# Patient Record
Sex: MALE | Race: WHITE | Employment: FULL TIME | URBAN - METROPOLITAN AREA
[De-identification: names, ages, dates, MRNs, and addresses within clinical notes are randomized per-mention and may not be internally consistent; named-entity substitution may affect disease eponyms.]

---

## 2017-08-21 ENCOUNTER — APPOINTMENT (OUTPATIENT)
Dept: MRI IMAGING | Age: 37
DRG: 103 | End: 2017-08-21
Payer: COMMERCIAL

## 2017-08-21 ENCOUNTER — HOSPITAL ENCOUNTER (INPATIENT)
Age: 37
LOS: 5 days | Discharge: HOME | DRG: 103 | End: 2017-08-26
Attending: EMERGENCY MEDICINE | Admitting: INTERNAL MEDICINE
Payer: COMMERCIAL

## 2017-08-21 ENCOUNTER — APPOINTMENT (OUTPATIENT)
Dept: CT IMAGING | Age: 37
DRG: 103 | End: 2017-08-21
Payer: COMMERCIAL

## 2017-08-21 ENCOUNTER — OFFICE VISIT (OUTPATIENT)
Dept: FAMILY MEDICINE CLINIC | Age: 37
End: 2017-08-21
Payer: COMMERCIAL

## 2017-08-21 VITALS
WEIGHT: 315 LBS | HEIGHT: 76 IN | HEART RATE: 90 BPM | RESPIRATION RATE: 18 BRPM | SYSTOLIC BLOOD PRESSURE: 140 MMHG | BODY MASS INDEX: 38.36 KG/M2 | DIASTOLIC BLOOD PRESSURE: 80 MMHG | OXYGEN SATURATION: 96 %

## 2017-08-21 DIAGNOSIS — R51.9 HEADACHE, UNSPECIFIED HEADACHE TYPE: ICD-10-CM

## 2017-08-21 DIAGNOSIS — G89.29 CHRONIC BILATERAL LOW BACK PAIN WITH RIGHT-SIDED SCIATICA: ICD-10-CM

## 2017-08-21 DIAGNOSIS — M54.2 CERVICALGIA: ICD-10-CM

## 2017-08-21 DIAGNOSIS — R53.1 RIGHT SIDED WEAKNESS: Primary | ICD-10-CM

## 2017-08-21 DIAGNOSIS — M54.41 CHRONIC BILATERAL LOW BACK PAIN WITH RIGHT-SIDED SCIATICA: ICD-10-CM

## 2017-08-21 DIAGNOSIS — R29.898 RIGHT ARM WEAKNESS: ICD-10-CM

## 2017-08-21 DIAGNOSIS — R40.1 OBTUNDED: Primary | ICD-10-CM

## 2017-08-21 DIAGNOSIS — R29.898 RIGHT LEG WEAKNESS: ICD-10-CM

## 2017-08-21 DIAGNOSIS — M54.59 MECHANICAL LOW BACK PAIN: ICD-10-CM

## 2017-08-21 LAB
ABSOLUTE EOS #: 0 K/UL (ref 0–0.4)
ABSOLUTE LYMPH #: 1.8 K/UL (ref 1–4.8)
ABSOLUTE MONO #: 0.7 K/UL (ref 0.1–1.2)
ALLEN TEST: ABNORMAL
ANION GAP SERPL CALCULATED.3IONS-SCNC: 12 MMOL/L (ref 9–17)
ANION GAP: 6 MMOL/L (ref 7–16)
BASOPHILS # BLD: 0 %
BASOPHILS ABSOLUTE: 0 K/UL (ref 0–0.2)
BUN BLDV-MCNC: 15 MG/DL (ref 6–20)
BUN/CREAT BLD: ABNORMAL (ref 9–20)
CALCIUM SERPL-MCNC: 9.2 MG/DL (ref 8.6–10.4)
CHLORIDE BLD-SCNC: 101 MMOL/L (ref 98–107)
CO2: 21 MMOL/L (ref 20–31)
CREAT SERPL-MCNC: 0.56 MG/DL (ref 0.7–1.2)
DIFFERENTIAL TYPE: NORMAL
EOSINOPHILS RELATIVE PERCENT: 0 %
FIO2: ABNORMAL
GFR AFRICAN AMERICAN: >60 ML/MIN
GFR NON-AFRICAN AMERICAN: >60 ML/MIN
GFR NON-AFRICAN AMERICAN: NORMAL ML/MIN
GFR SERPL CREATININE-BSD FRML MDRD: ABNORMAL ML/MIN/{1.73_M2}
GFR SERPL CREATININE-BSD FRML MDRD: ABNORMAL ML/MIN/{1.73_M2}
GFR SERPL CREATININE-BSD FRML MDRD: NORMAL ML/MIN
GFR SERPL CREATININE-BSD FRML MDRD: NORMAL ML/MIN/{1.73_M2}
GLUCOSE BLD-MCNC: 101 MG/DL (ref 74–100)
GLUCOSE BLD-MCNC: 77 MG/DL (ref 70–99)
GLUCOSE BLD-MCNC: 89 MG/DL (ref 75–110)
HCO3 VENOUS: 29.3 MMOL/L (ref 22–29)
HCT VFR BLD CALC: 45.8 % (ref 41–53)
HEMOGLOBIN: 15.6 G/DL (ref 13.5–17.5)
INR BLD: 1
LYMPHOCYTES # BLD: 24 %
MCH RBC QN AUTO: 30 PG (ref 26–34)
MCHC RBC AUTO-ENTMCNC: 34 G/DL (ref 31–37)
MCV RBC AUTO: 88.3 FL (ref 80–100)
MODE: ABNORMAL
MONOCYTES # BLD: 10 %
NEGATIVE BASE EXCESS, VEN: ABNORMAL (ref 0–2)
O2 DEVICE/FLOW/%: ABNORMAL
O2 SAT, VEN: 84 % (ref 60–85)
PARTIAL THROMBOPLASTIN TIME: 23.9 SEC (ref 21.3–31.3)
PATIENT TEMP: ABNORMAL
PCO2, VEN: 41 MM HG (ref 41–51)
PDW BLD-RTO: 13.8 % (ref 12.5–15.4)
PH VENOUS: 7.46 (ref 7.32–7.43)
PLATELET # BLD: 188 K/UL (ref 140–450)
PLATELET ESTIMATE: NORMAL
PMV BLD AUTO: 9.3 FL (ref 6–12)
PO2, VEN: 46.1 MM HG (ref 30–50)
POC CHLORIDE: 108 MMOL/L (ref 98–107)
POC CREATININE: 0.9 MG/DL (ref 0.51–1.19)
POC HEMATOCRIT: 48 % (ref 41–53)
POC HEMOGLOBIN: 16.4 G/DL (ref 13.5–17.5)
POC IONIZED CALCIUM: 1.15 MMOL/L (ref 1.15–1.33)
POC LACTIC ACID: 1.33 MMOL/L (ref 0.56–1.39)
POC PCO2 TEMP: ABNORMAL MM HG
POC PH TEMP: ABNORMAL
POC PO2 TEMP: ABNORMAL MM HG
POC POTASSIUM: 4.1 MMOL/L (ref 3.5–4.5)
POC SODIUM: 143 MMOL/L (ref 138–146)
POC TROPONIN I: 0 NG/ML (ref 0–0.1)
POC TROPONIN I: 0 NG/ML (ref 0–0.1)
POC TROPONIN INTERP: NORMAL
POC TROPONIN INTERP: NORMAL
POSITIVE BASE EXCESS, VEN: 5 (ref 0–3)
POTASSIUM SERPL-SCNC: 4.7 MMOL/L (ref 3.7–5.3)
PROTHROMBIN TIME: 10.5 SEC (ref 9.4–12.6)
RBC # BLD: 5.19 M/UL (ref 4.5–5.9)
RBC # BLD: NORMAL 10*6/UL
SAMPLE SITE: ABNORMAL
SEG NEUTROPHILS: 66 %
SEGMENTED NEUTROPHILS ABSOLUTE COUNT: 4.9 K/UL (ref 1.8–7.7)
SODIUM BLD-SCNC: 134 MMOL/L (ref 135–144)
TOTAL CO2, VENOUS: 31 MMOL/L (ref 23–30)
WBC # BLD: 7.5 K/UL (ref 3.5–11)
WBC # BLD: NORMAL 10*3/UL

## 2017-08-21 PROCEDURE — 82565 ASSAY OF CREATININE: CPT

## 2017-08-21 PROCEDURE — 84484 ASSAY OF TROPONIN QUANT: CPT

## 2017-08-21 PROCEDURE — 99254 IP/OBS CNSLTJ NEW/EST MOD 60: CPT | Performed by: PSYCHIATRY & NEUROLOGY

## 2017-08-21 PROCEDURE — 93000 ELECTROCARDIOGRAM COMPLETE: CPT | Performed by: NURSE PRACTITIONER

## 2017-08-21 PROCEDURE — 70551 MRI BRAIN STEM W/O DYE: CPT

## 2017-08-21 PROCEDURE — 6370000000 HC RX 637 (ALT 250 FOR IP): Performed by: INTERNAL MEDICINE

## 2017-08-21 PROCEDURE — 80048 BASIC METABOLIC PNL TOTAL CA: CPT

## 2017-08-21 PROCEDURE — 70498 CT ANGIOGRAPHY NECK: CPT

## 2017-08-21 PROCEDURE — 2060000000 HC ICU INTERMEDIATE R&B

## 2017-08-21 PROCEDURE — 82947 ASSAY GLUCOSE BLOOD QUANT: CPT

## 2017-08-21 PROCEDURE — 99285 EMERGENCY DEPT VISIT HI MDM: CPT

## 2017-08-21 PROCEDURE — 85014 HEMATOCRIT: CPT

## 2017-08-21 PROCEDURE — 83605 ASSAY OF LACTIC ACID: CPT

## 2017-08-21 PROCEDURE — 93005 ELECTROCARDIOGRAM TRACING: CPT

## 2017-08-21 PROCEDURE — 84132 ASSAY OF SERUM POTASSIUM: CPT

## 2017-08-21 PROCEDURE — 70496 CT ANGIOGRAPHY HEAD: CPT

## 2017-08-21 PROCEDURE — 82803 BLOOD GASES ANY COMBINATION: CPT

## 2017-08-21 PROCEDURE — 84295 ASSAY OF SERUM SODIUM: CPT

## 2017-08-21 PROCEDURE — 99214 OFFICE O/P EST MOD 30 MIN: CPT | Performed by: NURSE PRACTITIONER

## 2017-08-21 PROCEDURE — 6370000000 HC RX 637 (ALT 250 FOR IP): Performed by: EMERGENCY MEDICINE

## 2017-08-21 PROCEDURE — 6360000004 HC RX CONTRAST MEDICATION: Performed by: EMERGENCY MEDICINE

## 2017-08-21 PROCEDURE — 2580000003 HC RX 258: Performed by: INTERNAL MEDICINE

## 2017-08-21 PROCEDURE — 82435 ASSAY OF BLOOD CHLORIDE: CPT

## 2017-08-21 PROCEDURE — 85730 THROMBOPLASTIN TIME PARTIAL: CPT

## 2017-08-21 PROCEDURE — 82330 ASSAY OF CALCIUM: CPT

## 2017-08-21 PROCEDURE — 85025 COMPLETE CBC W/AUTO DIFF WBC: CPT

## 2017-08-21 PROCEDURE — 85610 PROTHROMBIN TIME: CPT

## 2017-08-21 RX ORDER — HYDROCHLOROTHIAZIDE 12.5 MG/1
TABLET ORAL
Refills: 0 | COMMUNITY
Start: 2017-08-10 | End: 2017-11-27 | Stop reason: SDUPTHER

## 2017-08-21 RX ORDER — SODIUM CHLORIDE 0.9 % (FLUSH) 0.9 %
10 SYRINGE (ML) INJECTION PRN
Status: DISCONTINUED | OUTPATIENT
Start: 2017-08-21 | End: 2017-08-26 | Stop reason: HOSPADM

## 2017-08-21 RX ORDER — ASPIRIN 81 MG/1
81 TABLET, CHEWABLE ORAL ONCE
Status: COMPLETED | OUTPATIENT
Start: 2017-08-21 | End: 2017-08-21

## 2017-08-21 RX ORDER — ACETAMINOPHEN 325 MG/1
650 TABLET ORAL EVERY 4 HOURS PRN
Status: DISCONTINUED | OUTPATIENT
Start: 2017-08-21 | End: 2017-08-22 | Stop reason: SDUPTHER

## 2017-08-21 RX ORDER — VENLAFAXINE HYDROCHLORIDE 150 MG/1
CAPSULE, EXTENDED RELEASE ORAL
Refills: 0 | COMMUNITY
Start: 2017-08-10 | End: 2017-11-27 | Stop reason: DRUGHIGH

## 2017-08-21 RX ORDER — SODIUM CHLORIDE 0.9 % (FLUSH) 0.9 %
10 SYRINGE (ML) INJECTION EVERY 12 HOURS SCHEDULED
Status: DISCONTINUED | OUTPATIENT
Start: 2017-08-21 | End: 2017-08-26 | Stop reason: HOSPADM

## 2017-08-21 RX ADMIN — ACETAMINOPHEN 650 MG: 325 TABLET ORAL at 21:14

## 2017-08-21 RX ADMIN — ASPIRIN 81 MG 81 MG: 81 TABLET ORAL at 17:50

## 2017-08-21 RX ADMIN — IOVERSOL 90 ML: 741 INJECTION INTRA-ARTERIAL; INTRAVENOUS at 16:55

## 2017-08-21 RX ADMIN — Medication 10 ML: at 21:16

## 2017-08-21 ASSESSMENT — ENCOUNTER SYMPTOMS
BLURRED VISION: 0
SHORTNESS OF BREATH: 0
EYE WATERING: 0
NAUSEA: 0
SCALP TENDERNESS: 1
CHEST TIGHTNESS: 0
VISUAL CHANGE: 0
FACIAL SWEATING: 0

## 2017-08-21 ASSESSMENT — PAIN SCALES - GENERAL
PAINLEVEL_OUTOF10: 7
PAINLEVEL_OUTOF10: 7

## 2017-08-21 ASSESSMENT — PAIN DESCRIPTION - DESCRIPTORS: DESCRIPTORS: HEADACHE

## 2017-08-22 ENCOUNTER — APPOINTMENT (OUTPATIENT)
Dept: MRI IMAGING | Age: 37
DRG: 103 | End: 2017-08-22
Payer: COMMERCIAL

## 2017-08-22 PROBLEM — G43.119 INTRACTABLE MIGRAINE WITH AURA WITHOUT STATUS MIGRAINOSUS: Status: ACTIVE | Noted: 2017-08-22

## 2017-08-22 LAB
ANION GAP SERPL CALCULATED.3IONS-SCNC: 15 MMOL/L (ref 9–17)
BUN BLDV-MCNC: 14 MG/DL (ref 6–20)
BUN/CREAT BLD: ABNORMAL (ref 9–20)
CALCIUM SERPL-MCNC: 8.9 MG/DL (ref 8.6–10.4)
CHLORIDE BLD-SCNC: 99 MMOL/L (ref 98–107)
CHOLESTEROL/HDL RATIO: 5.1
CHOLESTEROL: 147 MG/DL
CO2: 24 MMOL/L (ref 20–31)
CREAT SERPL-MCNC: 0.59 MG/DL (ref 0.7–1.2)
ESTIMATED AVERAGE GLUCOSE: 97 MG/DL
FOLATE: 11.6 NG/ML
GFR AFRICAN AMERICAN: >60 ML/MIN
GFR NON-AFRICAN AMERICAN: >60 ML/MIN
GFR SERPL CREATININE-BSD FRML MDRD: ABNORMAL ML/MIN/{1.73_M2}
GFR SERPL CREATININE-BSD FRML MDRD: ABNORMAL ML/MIN/{1.73_M2}
GLUCOSE BLD-MCNC: 132 MG/DL (ref 75–110)
GLUCOSE BLD-MCNC: 158 MG/DL (ref 75–110)
GLUCOSE BLD-MCNC: 187 MG/DL (ref 75–110)
GLUCOSE BLD-MCNC: 223 MG/DL (ref 75–110)
GLUCOSE BLD-MCNC: 99 MG/DL (ref 70–99)
HBA1C MFR BLD: 5 % (ref 4–6)
HCT VFR BLD CALC: 45.5 % (ref 41–53)
HDLC SERPL-MCNC: 29 MG/DL
HEMOGLOBIN: 15.6 G/DL (ref 13.5–17.5)
LDL CHOLESTEROL: 88 MG/DL (ref 0–130)
MCH RBC QN AUTO: 30.5 PG (ref 26–34)
MCHC RBC AUTO-ENTMCNC: 34.2 G/DL (ref 31–37)
MCV RBC AUTO: 89.1 FL (ref 80–100)
PDW BLD-RTO: 13.4 % (ref 12.5–15.4)
PLATELET # BLD: 186 K/UL (ref 140–450)
PMV BLD AUTO: 8.2 FL (ref 6–12)
POTASSIUM SERPL-SCNC: 3.6 MMOL/L (ref 3.7–5.3)
RBC # BLD: 5.11 M/UL (ref 4.5–5.9)
SODIUM BLD-SCNC: 138 MMOL/L (ref 135–144)
TRIGL SERPL-MCNC: 149 MG/DL
TSH SERPL DL<=0.05 MIU/L-ACNC: 1.63 MIU/L (ref 0.3–5)
VITAMIN B-12: 328 PG/ML (ref 211–946)
VLDLC SERPL CALC-MCNC: ABNORMAL MG/DL (ref 1–30)
WBC # BLD: 7.5 K/UL (ref 3.5–11)

## 2017-08-22 PROCEDURE — 6370000000 HC RX 637 (ALT 250 FOR IP): Performed by: PSYCHIATRY & NEUROLOGY

## 2017-08-22 PROCEDURE — 6360000002 HC RX W HCPCS: Performed by: INTERNAL MEDICINE

## 2017-08-22 PROCEDURE — G8979 MOBILITY GOAL STATUS: HCPCS

## 2017-08-22 PROCEDURE — 80048 BASIC METABOLIC PNL TOTAL CA: CPT

## 2017-08-22 PROCEDURE — 83036 HEMOGLOBIN GLYCOSYLATED A1C: CPT

## 2017-08-22 PROCEDURE — 72141 MRI NECK SPINE W/O DYE: CPT

## 2017-08-22 PROCEDURE — 2580000003 HC RX 258: Performed by: INTERNAL MEDICINE

## 2017-08-22 PROCEDURE — 82947 ASSAY GLUCOSE BLOOD QUANT: CPT

## 2017-08-22 PROCEDURE — 99222 1ST HOSP IP/OBS MODERATE 55: CPT | Performed by: PSYCHIATRY & NEUROLOGY

## 2017-08-22 PROCEDURE — 87040 BLOOD CULTURE FOR BACTERIA: CPT

## 2017-08-22 PROCEDURE — 6370000000 HC RX 637 (ALT 250 FOR IP): Performed by: INTERNAL MEDICINE

## 2017-08-22 PROCEDURE — 80061 LIPID PANEL: CPT

## 2017-08-22 PROCEDURE — 36415 COLL VENOUS BLD VENIPUNCTURE: CPT

## 2017-08-22 PROCEDURE — 82746 ASSAY OF FOLIC ACID SERUM: CPT

## 2017-08-22 PROCEDURE — 99222 1ST HOSP IP/OBS MODERATE 55: CPT | Performed by: INTERNAL MEDICINE

## 2017-08-22 PROCEDURE — G8978 MOBILITY CURRENT STATUS: HCPCS

## 2017-08-22 PROCEDURE — 82607 VITAMIN B-12: CPT

## 2017-08-22 PROCEDURE — 85027 COMPLETE CBC AUTOMATED: CPT

## 2017-08-22 PROCEDURE — 2060000000 HC ICU INTERMEDIATE R&B

## 2017-08-22 PROCEDURE — 72148 MRI LUMBAR SPINE W/O DYE: CPT

## 2017-08-22 PROCEDURE — 97530 THERAPEUTIC ACTIVITIES: CPT

## 2017-08-22 PROCEDURE — 84443 ASSAY THYROID STIM HORMONE: CPT

## 2017-08-22 PROCEDURE — 97162 PT EVAL MOD COMPLEX 30 MIN: CPT

## 2017-08-22 RX ORDER — POTASSIUM CHLORIDE 20 MEQ/1
40 TABLET, EXTENDED RELEASE ORAL PRN
Status: DISCONTINUED | OUTPATIENT
Start: 2017-08-22 | End: 2017-08-26 | Stop reason: HOSPADM

## 2017-08-22 RX ORDER — LISINOPRIL 5 MG/1
5 TABLET ORAL DAILY
Status: DISCONTINUED | OUTPATIENT
Start: 2017-08-22 | End: 2017-08-23

## 2017-08-22 RX ORDER — VENLAFAXINE 75 MG/1
75 TABLET ORAL DAILY
Status: DISCONTINUED | OUTPATIENT
Start: 2017-08-23 | End: 2017-08-22

## 2017-08-22 RX ORDER — VENLAFAXINE 75 MG/1
75 TABLET ORAL DAILY
Status: DISCONTINUED | OUTPATIENT
Start: 2017-08-22 | End: 2017-08-26 | Stop reason: HOSPADM

## 2017-08-22 RX ORDER — NICOTINE 21 MG/24HR
1 PATCH, TRANSDERMAL 24 HOURS TRANSDERMAL DAILY
Status: DISCONTINUED | OUTPATIENT
Start: 2017-08-22 | End: 2017-08-26 | Stop reason: HOSPADM

## 2017-08-22 RX ORDER — POTASSIUM CHLORIDE 20MEQ/15ML
40 LIQUID (ML) ORAL PRN
Status: DISCONTINUED | OUTPATIENT
Start: 2017-08-22 | End: 2017-08-26 | Stop reason: HOSPADM

## 2017-08-22 RX ORDER — DEXTROSE MONOHYDRATE 25 G/50ML
12.5 INJECTION, SOLUTION INTRAVENOUS PRN
Status: DISCONTINUED | OUTPATIENT
Start: 2017-08-22 | End: 2017-08-26 | Stop reason: HOSPADM

## 2017-08-22 RX ORDER — NICOTINE POLACRILEX 4 MG
15 LOZENGE BUCCAL PRN
Status: DISCONTINUED | OUTPATIENT
Start: 2017-08-22 | End: 2017-08-26 | Stop reason: HOSPADM

## 2017-08-22 RX ORDER — TIZANIDINE 2 MG/1
2 TABLET ORAL 2 TIMES DAILY
Status: DISCONTINUED | OUTPATIENT
Start: 2017-08-22 | End: 2017-08-26 | Stop reason: HOSPADM

## 2017-08-22 RX ORDER — SODIUM CHLORIDE 0.9 % (FLUSH) 0.9 %
10 SYRINGE (ML) INJECTION EVERY 12 HOURS SCHEDULED
Status: DISCONTINUED | OUTPATIENT
Start: 2017-08-22 | End: 2017-08-26 | Stop reason: HOSPADM

## 2017-08-22 RX ORDER — FAMOTIDINE 20 MG/1
20 TABLET, FILM COATED ORAL 2 TIMES DAILY
Status: DISCONTINUED | OUTPATIENT
Start: 2017-08-22 | End: 2017-08-26 | Stop reason: HOSPADM

## 2017-08-22 RX ORDER — METHYLPREDNISOLONE SODIUM SUCCINATE 125 MG/2ML
125 INJECTION, POWDER, LYOPHILIZED, FOR SOLUTION INTRAMUSCULAR; INTRAVENOUS EVERY 8 HOURS
Status: COMPLETED | OUTPATIENT
Start: 2017-08-22 | End: 2017-08-23

## 2017-08-22 RX ORDER — POTASSIUM CHLORIDE 7.45 MG/ML
10 INJECTION INTRAVENOUS PRN
Status: DISCONTINUED | OUTPATIENT
Start: 2017-08-22 | End: 2017-08-26 | Stop reason: HOSPADM

## 2017-08-22 RX ORDER — VENLAFAXINE 75 MG/1
75 TABLET ORAL DAILY
COMMUNITY
End: 2017-09-15 | Stop reason: DRUGHIGH

## 2017-08-22 RX ORDER — SODIUM CHLORIDE 0.9 % (FLUSH) 0.9 %
10 SYRINGE (ML) INJECTION PRN
Status: DISCONTINUED | OUTPATIENT
Start: 2017-08-22 | End: 2017-08-26 | Stop reason: HOSPADM

## 2017-08-22 RX ORDER — POLYETHYLENE GLYCOL 3350 17 G/17G
17 POWDER, FOR SOLUTION ORAL DAILY PRN
Status: DISCONTINUED | OUTPATIENT
Start: 2017-08-22 | End: 2017-08-26 | Stop reason: HOSPADM

## 2017-08-22 RX ORDER — METHYLPREDNISOLONE SODIUM SUCCINATE 40 MG/ML
40 INJECTION, POWDER, LYOPHILIZED, FOR SOLUTION INTRAMUSCULAR; INTRAVENOUS EVERY 8 HOURS
Status: DISCONTINUED | OUTPATIENT
Start: 2017-08-22 | End: 2017-08-22

## 2017-08-22 RX ORDER — DOCUSATE SODIUM 100 MG/1
100 CAPSULE, LIQUID FILLED ORAL DAILY
Status: DISCONTINUED | OUTPATIENT
Start: 2017-08-22 | End: 2017-08-26 | Stop reason: HOSPADM

## 2017-08-22 RX ORDER — ASPIRIN 81 MG/1
81 TABLET, CHEWABLE ORAL DAILY
Status: DISCONTINUED | OUTPATIENT
Start: 2017-08-22 | End: 2017-08-26 | Stop reason: HOSPADM

## 2017-08-22 RX ORDER — DEXTROSE MONOHYDRATE 50 MG/ML
100 INJECTION, SOLUTION INTRAVENOUS PRN
Status: DISCONTINUED | OUTPATIENT
Start: 2017-08-22 | End: 2017-08-26 | Stop reason: HOSPADM

## 2017-08-22 RX ORDER — BUTALBITAL, ACETAMINOPHEN AND CAFFEINE 50; 325; 40 MG/1; MG/1; MG/1
1 TABLET ORAL EVERY 4 HOURS PRN
Status: DISCONTINUED | OUTPATIENT
Start: 2017-08-22 | End: 2017-08-24

## 2017-08-22 RX ORDER — ACETAMINOPHEN 325 MG/1
650 TABLET ORAL EVERY 4 HOURS PRN
Status: DISCONTINUED | OUTPATIENT
Start: 2017-08-22 | End: 2017-08-26 | Stop reason: HOSPADM

## 2017-08-22 RX ORDER — ATORVASTATIN CALCIUM 40 MG/1
40 TABLET, FILM COATED ORAL NIGHTLY
Status: DISCONTINUED | OUTPATIENT
Start: 2017-08-22 | End: 2017-08-26 | Stop reason: HOSPADM

## 2017-08-22 RX ORDER — ONDANSETRON 2 MG/ML
4 INJECTION INTRAMUSCULAR; INTRAVENOUS EVERY 6 HOURS PRN
Status: DISCONTINUED | OUTPATIENT
Start: 2017-08-22 | End: 2017-08-26 | Stop reason: HOSPADM

## 2017-08-22 RX ADMIN — INSULIN LISPRO 4 UNITS: 100 INJECTION, SOLUTION INTRAVENOUS; SUBCUTANEOUS at 13:32

## 2017-08-22 RX ADMIN — METHYLPREDNISOLONE SODIUM SUCCINATE 125 MG: 125 INJECTION, POWDER, FOR SOLUTION INTRAMUSCULAR; INTRAVENOUS at 09:55

## 2017-08-22 RX ADMIN — BUTALBITAL, ACETAMINOPHEN, AND CAFFEINE 1 TABLET: 50; 325; 40 TABLET ORAL at 08:06

## 2017-08-22 RX ADMIN — ASPIRIN 81 MG: 81 TABLET, CHEWABLE ORAL at 09:55

## 2017-08-22 RX ADMIN — INSULIN LISPRO 1 UNITS: 100 INJECTION, SOLUTION INTRAVENOUS; SUBCUTANEOUS at 22:08

## 2017-08-22 RX ADMIN — ENOXAPARIN SODIUM 40 MG: 40 INJECTION SUBCUTANEOUS at 20:29

## 2017-08-22 RX ADMIN — METHYLPREDNISOLONE SODIUM SUCCINATE 125 MG: 125 INJECTION, POWDER, FOR SOLUTION INTRAMUSCULAR; INTRAVENOUS at 17:57

## 2017-08-22 RX ADMIN — DOCUSATE SODIUM 100 MG: 100 CAPSULE ORAL at 13:30

## 2017-08-22 RX ADMIN — INSULIN LISPRO 2 UNITS: 100 INJECTION, SOLUTION INTRAVENOUS; SUBCUTANEOUS at 17:51

## 2017-08-22 RX ADMIN — FAMOTIDINE 20 MG: 20 TABLET, FILM COATED ORAL at 01:08

## 2017-08-22 RX ADMIN — BUTALBITAL, ACETAMINOPHEN, AND CAFFEINE 1 TABLET: 50; 325; 40 TABLET ORAL at 22:08

## 2017-08-22 RX ADMIN — BUTALBITAL, ACETAMINOPHEN, AND CAFFEINE 1 TABLET: 50; 325; 40 TABLET ORAL at 13:31

## 2017-08-22 RX ADMIN — FAMOTIDINE 20 MG: 20 TABLET, FILM COATED ORAL at 09:55

## 2017-08-22 RX ADMIN — ENOXAPARIN SODIUM 40 MG: 40 INJECTION SUBCUTANEOUS at 09:55

## 2017-08-22 RX ADMIN — ATORVASTATIN CALCIUM 40 MG: 40 TABLET, FILM COATED ORAL at 20:27

## 2017-08-22 RX ADMIN — Medication 10 ML: at 20:29

## 2017-08-22 RX ADMIN — BUTALBITAL, ACETAMINOPHEN, AND CAFFEINE 1 TABLET: 50; 325; 40 TABLET ORAL at 17:57

## 2017-08-22 RX ADMIN — FAMOTIDINE 20 MG: 20 TABLET, FILM COATED ORAL at 20:27

## 2017-08-22 RX ADMIN — BUTALBITAL, ACETAMINOPHEN, AND CAFFEINE 1 TABLET: 50; 325; 40 TABLET ORAL at 01:08

## 2017-08-22 RX ADMIN — METHYLPREDNISOLONE SODIUM SUCCINATE 40 MG: 40 INJECTION, POWDER, FOR SOLUTION INTRAMUSCULAR; INTRAVENOUS at 01:08

## 2017-08-22 RX ADMIN — ATORVASTATIN CALCIUM 40 MG: 40 TABLET, FILM COATED ORAL at 01:08

## 2017-08-22 RX ADMIN — LISINOPRIL 5 MG: 5 TABLET ORAL at 13:31

## 2017-08-22 RX ADMIN — TIZANIDINE 2 MG: 2 TABLET ORAL at 20:28

## 2017-08-22 RX ADMIN — TIZANIDINE 2 MG: 2 TABLET ORAL at 13:31

## 2017-08-22 ASSESSMENT — PAIN SCALES - GENERAL
PAINLEVEL_OUTOF10: 9
PAINLEVEL_OUTOF10: 5
PAINLEVEL_OUTOF10: 0
PAINLEVEL_OUTOF10: 5
PAINLEVEL_OUTOF10: 7
PAINLEVEL_OUTOF10: 7
PAINLEVEL_OUTOF10: 6
PAINLEVEL_OUTOF10: 8
PAINLEVEL_OUTOF10: 7

## 2017-08-22 ASSESSMENT — ENCOUNTER SYMPTOMS
DIARRHEA: 0
PHOTOPHOBIA: 0
VOMITING: 0
CONSTIPATION: 0
SINUS PRESSURE: 0
BACK PAIN: 0
RHINORRHEA: 0
TROUBLE SWALLOWING: 0
COUGH: 0
BLOOD IN STOOL: 0
SHORTNESS OF BREATH: 0
NAUSEA: 0
SORE THROAT: 0
ABDOMINAL PAIN: 0

## 2017-08-22 ASSESSMENT — PAIN DESCRIPTION - PAIN TYPE
TYPE: ACUTE PAIN

## 2017-08-22 ASSESSMENT — PAIN DESCRIPTION - DESCRIPTORS
DESCRIPTORS: HEADACHE
DESCRIPTORS: HEADACHE

## 2017-08-22 ASSESSMENT — PAIN DESCRIPTION - LOCATION
LOCATION: NECK;BACK
LOCATION: HEAD
LOCATION: HEAD

## 2017-08-22 ASSESSMENT — PAIN DESCRIPTION - FREQUENCY
FREQUENCY: CONTINUOUS
FREQUENCY: CONTINUOUS

## 2017-08-22 ASSESSMENT — PAIN DESCRIPTION - ONSET
ONSET: ON-GOING
ONSET: ON-GOING

## 2017-08-22 ASSESSMENT — PAIN DESCRIPTION - PROGRESSION
CLINICAL_PROGRESSION: NOT CHANGED
CLINICAL_PROGRESSION: NOT CHANGED

## 2017-08-23 ENCOUNTER — TELEPHONE (OUTPATIENT)
Dept: FAMILY MEDICINE CLINIC | Age: 37
End: 2017-08-23

## 2017-08-23 LAB
GLUCOSE BLD-MCNC: 154 MG/DL (ref 75–110)
GLUCOSE BLD-MCNC: 168 MG/DL (ref 75–110)
GLUCOSE BLD-MCNC: 181 MG/DL (ref 75–110)
GLUCOSE BLD-MCNC: 184 MG/DL (ref 75–110)
LV EF: 60 %
LVEF MODALITY: NORMAL

## 2017-08-23 PROCEDURE — 6370000000 HC RX 637 (ALT 250 FOR IP): Performed by: PSYCHIATRY & NEUROLOGY

## 2017-08-23 PROCEDURE — 6370000000 HC RX 637 (ALT 250 FOR IP): Performed by: INTERNAL MEDICINE

## 2017-08-23 PROCEDURE — 6360000002 HC RX W HCPCS: Performed by: INTERNAL MEDICINE

## 2017-08-23 PROCEDURE — 6370000000 HC RX 637 (ALT 250 FOR IP): Performed by: STUDENT IN AN ORGANIZED HEALTH CARE EDUCATION/TRAINING PROGRAM

## 2017-08-23 PROCEDURE — 97116 GAIT TRAINING THERAPY: CPT

## 2017-08-23 PROCEDURE — 97530 THERAPEUTIC ACTIVITIES: CPT

## 2017-08-23 PROCEDURE — 93306 TTE W/DOPPLER COMPLETE: CPT

## 2017-08-23 PROCEDURE — 97110 THERAPEUTIC EXERCISES: CPT

## 2017-08-23 PROCEDURE — 2580000003 HC RX 258: Performed by: INTERNAL MEDICINE

## 2017-08-23 PROCEDURE — 99254 IP/OBS CNSLTJ NEW/EST MOD 60: CPT | Performed by: PHYSICAL MEDICINE & REHABILITATION

## 2017-08-23 PROCEDURE — 2060000000 HC ICU INTERMEDIATE R&B

## 2017-08-23 PROCEDURE — 82947 ASSAY GLUCOSE BLOOD QUANT: CPT

## 2017-08-23 PROCEDURE — 97535 SELF CARE MNGMENT TRAINING: CPT

## 2017-08-23 PROCEDURE — G8987 SELF CARE CURRENT STATUS: HCPCS

## 2017-08-23 PROCEDURE — 99232 SBSQ HOSP IP/OBS MODERATE 35: CPT | Performed by: INTERNAL MEDICINE

## 2017-08-23 PROCEDURE — 97166 OT EVAL MOD COMPLEX 45 MIN: CPT

## 2017-08-23 PROCEDURE — G8988 SELF CARE GOAL STATUS: HCPCS

## 2017-08-23 PROCEDURE — 99233 SBSQ HOSP IP/OBS HIGH 50: CPT | Performed by: PSYCHIATRY & NEUROLOGY

## 2017-08-23 RX ORDER — METHYLPREDNISOLONE SODIUM SUCCINATE 125 MG/2ML
125 INJECTION, POWDER, LYOPHILIZED, FOR SOLUTION INTRAMUSCULAR; INTRAVENOUS EVERY 8 HOURS
Status: COMPLETED | OUTPATIENT
Start: 2017-08-23 | End: 2017-08-24

## 2017-08-23 RX ORDER — CARVEDILOL 3.12 MG/1
3.12 TABLET ORAL 2 TIMES DAILY
Status: DISCONTINUED | OUTPATIENT
Start: 2017-08-23 | End: 2017-08-26 | Stop reason: HOSPADM

## 2017-08-23 RX ORDER — GABAPENTIN 100 MG/1
100 CAPSULE ORAL 2 TIMES DAILY
Status: DISCONTINUED | OUTPATIENT
Start: 2017-08-23 | End: 2017-08-24

## 2017-08-23 RX ADMIN — METHYLPREDNISOLONE SODIUM SUCCINATE 125 MG: 125 INJECTION, POWDER, FOR SOLUTION INTRAMUSCULAR; INTRAVENOUS at 16:51

## 2017-08-23 RX ADMIN — FAMOTIDINE 20 MG: 20 TABLET, FILM COATED ORAL at 08:12

## 2017-08-23 RX ADMIN — INSULIN LISPRO 2 UNITS: 100 INJECTION, SOLUTION INTRAVENOUS; SUBCUTANEOUS at 11:35

## 2017-08-23 RX ADMIN — INSULIN LISPRO 2 UNITS: 100 INJECTION, SOLUTION INTRAVENOUS; SUBCUTANEOUS at 08:13

## 2017-08-23 RX ADMIN — ENOXAPARIN SODIUM 40 MG: 40 INJECTION SUBCUTANEOUS at 08:12

## 2017-08-23 RX ADMIN — CARVEDILOL 3.12 MG: 3.12 TABLET, FILM COATED ORAL at 20:39

## 2017-08-23 RX ADMIN — VENLAFAXINE 75 MG: 75 TABLET ORAL at 08:12

## 2017-08-23 RX ADMIN — BUTALBITAL, ACETAMINOPHEN, AND CAFFEINE 1 TABLET: 50; 325; 40 TABLET ORAL at 16:45

## 2017-08-23 RX ADMIN — FAMOTIDINE 20 MG: 20 TABLET, FILM COATED ORAL at 20:39

## 2017-08-23 RX ADMIN — INSULIN LISPRO 2 UNITS: 100 INJECTION, SOLUTION INTRAVENOUS; SUBCUTANEOUS at 16:45

## 2017-08-23 RX ADMIN — TIZANIDINE 2 MG: 2 TABLET ORAL at 08:12

## 2017-08-23 RX ADMIN — METHYLPREDNISOLONE SODIUM SUCCINATE 125 MG: 125 INJECTION, POWDER, FOR SOLUTION INTRAMUSCULAR; INTRAVENOUS at 01:22

## 2017-08-23 RX ADMIN — ENOXAPARIN SODIUM 40 MG: 40 INJECTION SUBCUTANEOUS at 20:39

## 2017-08-23 RX ADMIN — Medication 10 ML: at 20:40

## 2017-08-23 RX ADMIN — Medication 10 ML: at 08:12

## 2017-08-23 RX ADMIN — BUTALBITAL, ACETAMINOPHEN, AND CAFFEINE 1 TABLET: 50; 325; 40 TABLET ORAL at 04:48

## 2017-08-23 RX ADMIN — INSULIN LISPRO 1 UNITS: 100 INJECTION, SOLUTION INTRAVENOUS; SUBCUTANEOUS at 20:46

## 2017-08-23 RX ADMIN — METHYLPREDNISOLONE SODIUM SUCCINATE 125 MG: 125 INJECTION, POWDER, FOR SOLUTION INTRAMUSCULAR; INTRAVENOUS at 11:35

## 2017-08-23 RX ADMIN — LISINOPRIL 5 MG: 5 TABLET ORAL at 08:12

## 2017-08-23 RX ADMIN — TIZANIDINE 2 MG: 2 TABLET ORAL at 20:39

## 2017-08-23 RX ADMIN — BUTALBITAL, ACETAMINOPHEN, AND CAFFEINE 1 TABLET: 50; 325; 40 TABLET ORAL at 08:11

## 2017-08-23 RX ADMIN — BUTALBITAL, ACETAMINOPHEN, AND CAFFEINE 1 TABLET: 50; 325; 40 TABLET ORAL at 11:34

## 2017-08-23 RX ADMIN — DOCUSATE SODIUM 100 MG: 100 CAPSULE ORAL at 08:12

## 2017-08-23 RX ADMIN — ASPIRIN 81 MG: 81 TABLET, CHEWABLE ORAL at 08:12

## 2017-08-23 RX ADMIN — GABAPENTIN 100 MG: 100 CAPSULE ORAL at 11:34

## 2017-08-23 RX ADMIN — VENLAFAXINE 75 MG: 75 TABLET ORAL at 00:15

## 2017-08-23 RX ADMIN — ATORVASTATIN CALCIUM 40 MG: 40 TABLET, FILM COATED ORAL at 20:39

## 2017-08-23 RX ADMIN — BUTALBITAL, ACETAMINOPHEN, AND CAFFEINE 1 TABLET: 50; 325; 40 TABLET ORAL at 20:39

## 2017-08-23 RX ADMIN — GABAPENTIN 100 MG: 100 CAPSULE ORAL at 20:39

## 2017-08-23 ASSESSMENT — PAIN SCALES - GENERAL
PAINLEVEL_OUTOF10: 6
PAINLEVEL_OUTOF10: 7
PAINLEVEL_OUTOF10: 8
PAINLEVEL_OUTOF10: 7
PAINLEVEL_OUTOF10: 9
PAINLEVEL_OUTOF10: 0
PAINLEVEL_OUTOF10: 6
PAINLEVEL_OUTOF10: 6

## 2017-08-23 ASSESSMENT — PAIN DESCRIPTION - LOCATION
LOCATION: HIP;BACK;NECK
LOCATION: HIP;BACK

## 2017-08-23 ASSESSMENT — PAIN DESCRIPTION - PAIN TYPE
TYPE: ACUTE PAIN;CHRONIC PAIN
TYPE: ACUTE PAIN

## 2017-08-23 ASSESSMENT — PAIN DESCRIPTION - DESCRIPTORS
DESCRIPTORS: ACHING
DESCRIPTORS: ACHING

## 2017-08-23 ASSESSMENT — PAIN DESCRIPTION - ORIENTATION
ORIENTATION: RIGHT
ORIENTATION: RIGHT

## 2017-08-23 ASSESSMENT — PAIN DESCRIPTION - FREQUENCY: FREQUENCY: CONTINUOUS

## 2017-08-24 LAB
ANION GAP SERPL CALCULATED.3IONS-SCNC: 14 MMOL/L (ref 9–17)
BUN BLDV-MCNC: 20 MG/DL (ref 6–20)
BUN/CREAT BLD: ABNORMAL (ref 9–20)
CALCIUM SERPL-MCNC: 8.8 MG/DL (ref 8.6–10.4)
CHLORIDE BLD-SCNC: 103 MMOL/L (ref 98–107)
CO2: 21 MMOL/L (ref 20–31)
CREAT SERPL-MCNC: 0.63 MG/DL (ref 0.7–1.2)
GFR AFRICAN AMERICAN: >60 ML/MIN
GFR NON-AFRICAN AMERICAN: >60 ML/MIN
GFR SERPL CREATININE-BSD FRML MDRD: ABNORMAL ML/MIN/{1.73_M2}
GFR SERPL CREATININE-BSD FRML MDRD: ABNORMAL ML/MIN/{1.73_M2}
GLUCOSE BLD-MCNC: 142 MG/DL (ref 70–99)
GLUCOSE BLD-MCNC: 143 MG/DL (ref 75–110)
GLUCOSE BLD-MCNC: 145 MG/DL (ref 75–110)
GLUCOSE BLD-MCNC: 154 MG/DL (ref 75–110)
GLUCOSE BLD-MCNC: 156 MG/DL (ref 75–110)
POTASSIUM SERPL-SCNC: 4.3 MMOL/L (ref 3.7–5.3)
SODIUM BLD-SCNC: 138 MMOL/L (ref 135–144)

## 2017-08-24 PROCEDURE — 82947 ASSAY GLUCOSE BLOOD QUANT: CPT

## 2017-08-24 PROCEDURE — 97116 GAIT TRAINING THERAPY: CPT

## 2017-08-24 PROCEDURE — 6370000000 HC RX 637 (ALT 250 FOR IP): Performed by: PSYCHIATRY & NEUROLOGY

## 2017-08-24 PROCEDURE — 2060000000 HC ICU INTERMEDIATE R&B

## 2017-08-24 PROCEDURE — 36415 COLL VENOUS BLD VENIPUNCTURE: CPT

## 2017-08-24 PROCEDURE — 6370000000 HC RX 637 (ALT 250 FOR IP): Performed by: STUDENT IN AN ORGANIZED HEALTH CARE EDUCATION/TRAINING PROGRAM

## 2017-08-24 PROCEDURE — 99232 SBSQ HOSP IP/OBS MODERATE 35: CPT | Performed by: INTERNAL MEDICINE

## 2017-08-24 PROCEDURE — 6370000000 HC RX 637 (ALT 250 FOR IP): Performed by: INTERNAL MEDICINE

## 2017-08-24 PROCEDURE — 6360000002 HC RX W HCPCS: Performed by: INTERNAL MEDICINE

## 2017-08-24 PROCEDURE — 2580000003 HC RX 258: Performed by: INTERNAL MEDICINE

## 2017-08-24 PROCEDURE — 97110 THERAPEUTIC EXERCISES: CPT

## 2017-08-24 PROCEDURE — 99223 1ST HOSP IP/OBS HIGH 75: CPT | Performed by: PSYCHIATRY & NEUROLOGY

## 2017-08-24 PROCEDURE — 80048 BASIC METABOLIC PNL TOTAL CA: CPT

## 2017-08-24 RX ORDER — PREGABALIN 50 MG/1
50 CAPSULE ORAL 2 TIMES DAILY
Status: DISCONTINUED | OUTPATIENT
Start: 2017-08-24 | End: 2017-08-26 | Stop reason: HOSPADM

## 2017-08-24 RX ADMIN — INSULIN LISPRO 2 UNITS: 100 INJECTION, SOLUTION INTRAVENOUS; SUBCUTANEOUS at 08:36

## 2017-08-24 RX ADMIN — BUTALBITAL, ACETAMINOPHEN, AND CAFFEINE 1 TABLET: 50; 325; 40 TABLET ORAL at 03:51

## 2017-08-24 RX ADMIN — ATORVASTATIN CALCIUM 40 MG: 40 TABLET, FILM COATED ORAL at 20:01

## 2017-08-24 RX ADMIN — ENOXAPARIN SODIUM 40 MG: 40 INJECTION SUBCUTANEOUS at 20:02

## 2017-08-24 RX ADMIN — PREGABALIN 50 MG: 50 CAPSULE ORAL at 11:13

## 2017-08-24 RX ADMIN — INSULIN LISPRO 2 UNITS: 100 INJECTION, SOLUTION INTRAVENOUS; SUBCUTANEOUS at 12:22

## 2017-08-24 RX ADMIN — CARVEDILOL 3.12 MG: 3.12 TABLET, FILM COATED ORAL at 20:01

## 2017-08-24 RX ADMIN — FAMOTIDINE 20 MG: 20 TABLET, FILM COATED ORAL at 20:01

## 2017-08-24 RX ADMIN — DOCUSATE SODIUM 100 MG: 100 CAPSULE ORAL at 08:30

## 2017-08-24 RX ADMIN — TIZANIDINE 2 MG: 2 TABLET ORAL at 08:31

## 2017-08-24 RX ADMIN — GABAPENTIN 100 MG: 100 CAPSULE ORAL at 08:31

## 2017-08-24 RX ADMIN — CARVEDILOL 3.12 MG: 3.12 TABLET, FILM COATED ORAL at 08:30

## 2017-08-24 RX ADMIN — TIZANIDINE 2 MG: 2 TABLET ORAL at 20:01

## 2017-08-24 RX ADMIN — BUTALBITAL, ACETAMINOPHEN, AND CAFFEINE 1 TABLET: 50; 325; 40 TABLET ORAL at 08:29

## 2017-08-24 RX ADMIN — FAMOTIDINE 20 MG: 20 TABLET, FILM COATED ORAL at 08:31

## 2017-08-24 RX ADMIN — ENOXAPARIN SODIUM 40 MG: 40 INJECTION SUBCUTANEOUS at 08:31

## 2017-08-24 RX ADMIN — INSULIN LISPRO 1 UNITS: 100 INJECTION, SOLUTION INTRAVENOUS; SUBCUTANEOUS at 21:26

## 2017-08-24 RX ADMIN — ACETAMINOPHEN 650 MG: 325 TABLET ORAL at 13:12

## 2017-08-24 RX ADMIN — ASPIRIN 81 MG: 81 TABLET, CHEWABLE ORAL at 08:30

## 2017-08-24 RX ADMIN — VENLAFAXINE 75 MG: 75 TABLET ORAL at 08:31

## 2017-08-24 RX ADMIN — PREGABALIN 50 MG: 50 CAPSULE ORAL at 20:01

## 2017-08-24 RX ADMIN — METHYLPREDNISOLONE SODIUM SUCCINATE 125 MG: 125 INJECTION, POWDER, FOR SOLUTION INTRAMUSCULAR; INTRAVENOUS at 03:47

## 2017-08-24 RX ADMIN — Medication 10 ML: at 20:02

## 2017-08-24 ASSESSMENT — PAIN DESCRIPTION - PAIN TYPE
TYPE: CHRONIC PAIN
TYPE: ACUTE PAIN
TYPE: ACUTE PAIN

## 2017-08-24 ASSESSMENT — PAIN SCALES - GENERAL
PAINLEVEL_OUTOF10: 5
PAINLEVEL_OUTOF10: 7
PAINLEVEL_OUTOF10: 8
PAINLEVEL_OUTOF10: 9
PAINLEVEL_OUTOF10: 8
PAINLEVEL_OUTOF10: 7

## 2017-08-24 ASSESSMENT — PAIN DESCRIPTION - ONSET: ONSET: ON-GOING

## 2017-08-24 ASSESSMENT — PAIN DESCRIPTION - FREQUENCY: FREQUENCY: CONTINUOUS

## 2017-08-24 ASSESSMENT — PAIN DESCRIPTION - LOCATION
LOCATION: NECK
LOCATION: HEAD;NECK

## 2017-08-24 ASSESSMENT — PAIN DESCRIPTION - PROGRESSION: CLINICAL_PROGRESSION: NOT CHANGED

## 2017-08-24 ASSESSMENT — PAIN DESCRIPTION - DESCRIPTORS: DESCRIPTORS: ACHING;DISCOMFORT;NUMBNESS;TINGLING

## 2017-08-25 LAB
GLUCOSE BLD-MCNC: 73 MG/DL (ref 75–110)
GLUCOSE BLD-MCNC: 76 MG/DL (ref 75–110)
GLUCOSE BLD-MCNC: 82 MG/DL (ref 75–110)
GLUCOSE BLD-MCNC: 86 MG/DL (ref 75–110)
GLUCOSE BLD-MCNC: 92 MG/DL (ref 75–110)

## 2017-08-25 PROCEDURE — 2060000000 HC ICU INTERMEDIATE R&B

## 2017-08-25 PROCEDURE — 6370000000 HC RX 637 (ALT 250 FOR IP): Performed by: STUDENT IN AN ORGANIZED HEALTH CARE EDUCATION/TRAINING PROGRAM

## 2017-08-25 PROCEDURE — 82947 ASSAY GLUCOSE BLOOD QUANT: CPT

## 2017-08-25 PROCEDURE — 97530 THERAPEUTIC ACTIVITIES: CPT

## 2017-08-25 PROCEDURE — 99232 SBSQ HOSP IP/OBS MODERATE 35: CPT | Performed by: INTERNAL MEDICINE

## 2017-08-25 PROCEDURE — 99232 SBSQ HOSP IP/OBS MODERATE 35: CPT | Performed by: PHYSICAL MEDICINE & REHABILITATION

## 2017-08-25 PROCEDURE — 97110 THERAPEUTIC EXERCISES: CPT

## 2017-08-25 PROCEDURE — 6370000000 HC RX 637 (ALT 250 FOR IP): Performed by: INTERNAL MEDICINE

## 2017-08-25 PROCEDURE — 2580000003 HC RX 258: Performed by: INTERNAL MEDICINE

## 2017-08-25 PROCEDURE — 97116 GAIT TRAINING THERAPY: CPT

## 2017-08-25 PROCEDURE — 6360000002 HC RX W HCPCS: Performed by: INTERNAL MEDICINE

## 2017-08-25 PROCEDURE — 6370000000 HC RX 637 (ALT 250 FOR IP): Performed by: PSYCHIATRY & NEUROLOGY

## 2017-08-25 RX ADMIN — PREGABALIN 50 MG: 50 CAPSULE ORAL at 19:37

## 2017-08-25 RX ADMIN — TIZANIDINE 2 MG: 2 TABLET ORAL at 08:52

## 2017-08-25 RX ADMIN — CARVEDILOL 3.12 MG: 3.12 TABLET, FILM COATED ORAL at 08:50

## 2017-08-25 RX ADMIN — VENLAFAXINE 75 MG: 75 TABLET ORAL at 08:51

## 2017-08-25 RX ADMIN — FAMOTIDINE 20 MG: 20 TABLET, FILM COATED ORAL at 19:37

## 2017-08-25 RX ADMIN — ASPIRIN 81 MG: 81 TABLET, CHEWABLE ORAL at 08:50

## 2017-08-25 RX ADMIN — Medication 10 ML: at 08:52

## 2017-08-25 RX ADMIN — Medication 10 ML: at 08:51

## 2017-08-25 RX ADMIN — ENOXAPARIN SODIUM 40 MG: 40 INJECTION SUBCUTANEOUS at 08:50

## 2017-08-25 RX ADMIN — ENOXAPARIN SODIUM 40 MG: 40 INJECTION SUBCUTANEOUS at 19:37

## 2017-08-25 RX ADMIN — TIZANIDINE 2 MG: 2 TABLET ORAL at 19:37

## 2017-08-25 RX ADMIN — DOCUSATE SODIUM 100 MG: 100 CAPSULE ORAL at 08:50

## 2017-08-25 RX ADMIN — PREGABALIN 50 MG: 50 CAPSULE ORAL at 08:50

## 2017-08-25 RX ADMIN — FAMOTIDINE 20 MG: 20 TABLET, FILM COATED ORAL at 08:50

## 2017-08-25 RX ADMIN — ATORVASTATIN CALCIUM 40 MG: 40 TABLET, FILM COATED ORAL at 19:37

## 2017-08-25 RX ADMIN — CARVEDILOL 3.12 MG: 3.12 TABLET, FILM COATED ORAL at 19:37

## 2017-08-25 ASSESSMENT — PAIN DESCRIPTION - PAIN TYPE: TYPE: ACUTE PAIN;CHRONIC PAIN

## 2017-08-25 ASSESSMENT — PAIN DESCRIPTION - LOCATION: LOCATION: NECK;BACK

## 2017-08-25 ASSESSMENT — PAIN SCALES - GENERAL: PAINLEVEL_OUTOF10: 6

## 2017-08-25 ASSESSMENT — PAIN DESCRIPTION - ORIENTATION: ORIENTATION: RIGHT

## 2017-08-26 VITALS
BODY MASS INDEX: 38.36 KG/M2 | SYSTOLIC BLOOD PRESSURE: 127 MMHG | OXYGEN SATURATION: 99 % | RESPIRATION RATE: 18 BRPM | DIASTOLIC BLOOD PRESSURE: 64 MMHG | WEIGHT: 315 LBS | HEIGHT: 76 IN | TEMPERATURE: 98.3 F | HEART RATE: 66 BPM

## 2017-08-26 LAB
GLUCOSE BLD-MCNC: 65 MG/DL (ref 75–110)
GLUCOSE BLD-MCNC: 81 MG/DL (ref 75–110)
GLUCOSE BLD-MCNC: 98 MG/DL (ref 75–110)

## 2017-08-26 PROCEDURE — 97116 GAIT TRAINING THERAPY: CPT

## 2017-08-26 PROCEDURE — 97535 SELF CARE MNGMENT TRAINING: CPT

## 2017-08-26 PROCEDURE — 99232 SBSQ HOSP IP/OBS MODERATE 35: CPT | Performed by: INTERNAL MEDICINE

## 2017-08-26 PROCEDURE — 97112 NEUROMUSCULAR REEDUCATION: CPT

## 2017-08-26 PROCEDURE — 6370000000 HC RX 637 (ALT 250 FOR IP): Performed by: PSYCHIATRY & NEUROLOGY

## 2017-08-26 PROCEDURE — 97110 THERAPEUTIC EXERCISES: CPT

## 2017-08-26 PROCEDURE — 6360000002 HC RX W HCPCS: Performed by: INTERNAL MEDICINE

## 2017-08-26 PROCEDURE — 82947 ASSAY GLUCOSE BLOOD QUANT: CPT

## 2017-08-26 PROCEDURE — 6370000000 HC RX 637 (ALT 250 FOR IP): Performed by: STUDENT IN AN ORGANIZED HEALTH CARE EDUCATION/TRAINING PROGRAM

## 2017-08-26 PROCEDURE — 6370000000 HC RX 637 (ALT 250 FOR IP): Performed by: INTERNAL MEDICINE

## 2017-08-26 RX ORDER — PREGABALIN 50 MG/1
50 CAPSULE ORAL 2 TIMES DAILY
Qty: 90 CAPSULE | Refills: 3 | Status: SHIPPED | OUTPATIENT
Start: 2017-08-26 | End: 2017-09-25 | Stop reason: SDUPTHER

## 2017-08-26 RX ORDER — FAMOTIDINE 20 MG/1
20 TABLET, FILM COATED ORAL 2 TIMES DAILY
Qty: 60 TABLET | Refills: 1 | Status: SHIPPED | OUTPATIENT
Start: 2017-08-26 | End: 2017-09-15 | Stop reason: ALTCHOICE

## 2017-08-26 RX ORDER — TIZANIDINE 2 MG/1
2 TABLET ORAL 2 TIMES DAILY
Qty: 30 TABLET | Refills: 1 | Status: SHIPPED | OUTPATIENT
Start: 2017-08-26 | End: 2017-09-15 | Stop reason: SDUPTHER

## 2017-08-26 RX ORDER — CARVEDILOL 3.12 MG/1
3.12 TABLET ORAL 2 TIMES DAILY
Qty: 60 TABLET | Refills: 3 | Status: SHIPPED | OUTPATIENT
Start: 2017-08-26 | End: 2017-09-15 | Stop reason: SDUPTHER

## 2017-08-26 RX ORDER — ASPIRIN 81 MG/1
81 TABLET, CHEWABLE ORAL DAILY
Qty: 30 TABLET | Refills: 3 | Status: SHIPPED | OUTPATIENT
Start: 2017-08-26 | End: 2017-11-27 | Stop reason: SDUPTHER

## 2017-08-26 RX ORDER — ATORVASTATIN CALCIUM 40 MG/1
40 TABLET, FILM COATED ORAL NIGHTLY
Qty: 30 TABLET | Refills: 3 | Status: SHIPPED | OUTPATIENT
Start: 2017-08-26 | End: 2017-09-15 | Stop reason: SDUPTHER

## 2017-08-26 RX ADMIN — VENLAFAXINE 75 MG: 75 TABLET ORAL at 08:38

## 2017-08-26 RX ADMIN — ACETAMINOPHEN 650 MG: 325 TABLET ORAL at 07:36

## 2017-08-26 RX ADMIN — PREGABALIN 50 MG: 50 CAPSULE ORAL at 08:38

## 2017-08-26 RX ADMIN — ENOXAPARIN SODIUM 40 MG: 40 INJECTION SUBCUTANEOUS at 08:38

## 2017-08-26 RX ADMIN — CARVEDILOL 3.12 MG: 3.12 TABLET, FILM COATED ORAL at 08:38

## 2017-08-26 RX ADMIN — FAMOTIDINE 20 MG: 20 TABLET, FILM COATED ORAL at 08:38

## 2017-08-26 RX ADMIN — ASPIRIN 81 MG: 81 TABLET, CHEWABLE ORAL at 08:38

## 2017-08-26 RX ADMIN — TIZANIDINE 2 MG: 2 TABLET ORAL at 08:38

## 2017-08-26 ASSESSMENT — PAIN SCALES - GENERAL
PAINLEVEL_OUTOF10: 3
PAINLEVEL_OUTOF10: 7
PAINLEVEL_OUTOF10: 3

## 2017-08-26 ASSESSMENT — PAIN DESCRIPTION - LOCATION: LOCATION: HEAD

## 2017-08-26 ASSESSMENT — PAIN DESCRIPTION - PAIN TYPE: TYPE: ACUTE PAIN

## 2017-08-26 ASSESSMENT — PAIN DESCRIPTION - DESCRIPTORS: DESCRIPTORS: HEADACHE

## 2017-08-28 ENCOUNTER — TELEPHONE (OUTPATIENT)
Dept: FAMILY MEDICINE CLINIC | Age: 37
End: 2017-08-28

## 2017-08-28 LAB
CULTURE: NORMAL
EKG ATRIAL RATE: 92 BPM
EKG P AXIS: 51 DEGREES
EKG P-R INTERVAL: 152 MS
EKG Q-T INTERVAL: 362 MS
EKG QRS DURATION: 94 MS
EKG QTC CALCULATION (BAZETT): 447 MS
EKG R AXIS: 23 DEGREES
EKG T AXIS: 29 DEGREES
EKG VENTRICULAR RATE: 92 BPM
Lab: NORMAL
Lab: NORMAL
SPECIMEN DESCRIPTION: NORMAL
SPECIMEN DESCRIPTION: NORMAL
STATUS: NORMAL
STATUS: NORMAL

## 2017-08-29 ENCOUNTER — OFFICE VISIT (OUTPATIENT)
Dept: FAMILY MEDICINE CLINIC | Age: 37
End: 2017-08-29

## 2017-08-29 VITALS
BODY MASS INDEX: 49.42 KG/M2 | SYSTOLIC BLOOD PRESSURE: 138 MMHG | WEIGHT: 315 LBS | HEART RATE: 86 BPM | OXYGEN SATURATION: 97 % | TEMPERATURE: 98.3 F | RESPIRATION RATE: 18 BRPM | DIASTOLIC BLOOD PRESSURE: 64 MMHG

## 2017-08-29 DIAGNOSIS — M48.00 SPINAL STENOSIS, UNSPECIFIED SPINAL REGION: ICD-10-CM

## 2017-08-29 DIAGNOSIS — R73.9 HYPERGLYCEMIA: ICD-10-CM

## 2017-08-29 DIAGNOSIS — G54.0 THORACIC OUTLET SYNDROME: Primary | ICD-10-CM

## 2017-08-29 LAB — HBA1C MFR BLD: 4.9 %

## 2017-08-29 PROCEDURE — 83036 HEMOGLOBIN GLYCOSYLATED A1C: CPT | Performed by: NURSE PRACTITIONER

## 2017-08-29 PROCEDURE — 99213 OFFICE O/P EST LOW 20 MIN: CPT | Performed by: NURSE PRACTITIONER

## 2017-08-29 RX ORDER — PREGABALIN 50 MG/1
50 CAPSULE ORAL 3 TIMES DAILY
COMMUNITY
End: 2017-09-15 | Stop reason: SDUPTHER

## 2017-08-29 RX ORDER — CARVEDILOL 3.12 MG/1
3.12 TABLET ORAL 2 TIMES DAILY WITH MEALS
COMMUNITY
End: 2017-11-27 | Stop reason: SDUPTHER

## 2017-08-29 RX ORDER — ATORVASTATIN CALCIUM 40 MG/1
40 TABLET, FILM COATED ORAL DAILY
COMMUNITY
End: 2017-11-27 | Stop reason: SDUPTHER

## 2017-08-29 RX ORDER — FAMOTIDINE 20 MG/1
20 TABLET, FILM COATED ORAL 2 TIMES DAILY
COMMUNITY
End: 2017-09-15 | Stop reason: ALTCHOICE

## 2017-08-29 RX ORDER — TIZANIDINE 2 MG/1
TABLET ORAL
Refills: 0 | COMMUNITY
Start: 2017-08-26 | End: 2017-09-25 | Stop reason: SDUPTHER

## 2017-08-30 ASSESSMENT — ENCOUNTER SYMPTOMS
CHEST TIGHTNESS: 0
COLOR CHANGE: 0

## 2017-09-15 ENCOUNTER — OFFICE VISIT (OUTPATIENT)
Dept: NEUROSURGERY | Age: 37
End: 2017-09-15
Payer: COMMERCIAL

## 2017-09-15 VITALS
DIASTOLIC BLOOD PRESSURE: 78 MMHG | HEIGHT: 76 IN | BODY MASS INDEX: 38.36 KG/M2 | WEIGHT: 315 LBS | SYSTOLIC BLOOD PRESSURE: 132 MMHG

## 2017-09-15 DIAGNOSIS — M51.9 LUMBAR DISC DISEASE: Primary | ICD-10-CM

## 2017-09-15 PROCEDURE — 99203 OFFICE O/P NEW LOW 30 MIN: CPT | Performed by: NEUROLOGICAL SURGERY

## 2017-09-25 ENCOUNTER — OFFICE VISIT (OUTPATIENT)
Dept: NEUROLOGY | Age: 37
End: 2017-09-25
Payer: COMMERCIAL

## 2017-09-25 VITALS
HEART RATE: 88 BPM | SYSTOLIC BLOOD PRESSURE: 161 MMHG | WEIGHT: 315 LBS | DIASTOLIC BLOOD PRESSURE: 97 MMHG | BODY MASS INDEX: 38.36 KG/M2 | HEIGHT: 76 IN

## 2017-09-25 DIAGNOSIS — M54.59 MECHANICAL LOW BACK PAIN: Primary | ICD-10-CM

## 2017-09-25 PROCEDURE — 99213 OFFICE O/P EST LOW 20 MIN: CPT | Performed by: NURSE PRACTITIONER

## 2017-09-25 RX ORDER — PREGABALIN 50 MG/1
50 CAPSULE ORAL 3 TIMES DAILY
Qty: 90 CAPSULE | Refills: 3 | Status: SHIPPED | OUTPATIENT
Start: 2017-09-25 | End: 2018-04-18 | Stop reason: ALTCHOICE

## 2017-09-25 RX ORDER — TIZANIDINE 2 MG/1
2 TABLET ORAL EVERY 6 HOURS PRN
Qty: 80 TABLET | Refills: 0 | Status: SHIPPED | OUTPATIENT
Start: 2017-09-25 | End: 2018-04-18 | Stop reason: ALTCHOICE

## 2017-09-27 ENCOUNTER — TELEPHONE (OUTPATIENT)
Dept: NEUROLOGY | Age: 37
End: 2017-09-27

## 2017-09-27 DIAGNOSIS — M54.5 CHRONIC BILATERAL LOW BACK PAIN, WITH SCIATICA PRESENCE UNSPECIFIED: Primary | ICD-10-CM

## 2017-09-27 DIAGNOSIS — G89.29 CHRONIC BILATERAL LOW BACK PAIN, WITH SCIATICA PRESENCE UNSPECIFIED: Primary | ICD-10-CM

## 2017-09-27 NOTE — TELEPHONE ENCOUNTER
She called and said they had contacted . Lisa Nur to schedule him to see Dr. Al Johnson. He lives in [place] which is close to [facility] and would prefer to see Pain Management in [facility]. I discussed with CM, CNP and she siad that is fine. A New referral will be processed.

## 2017-10-19 ENCOUNTER — TELEPHONE (OUTPATIENT)
Dept: NEUROLOGY | Age: 37
End: 2017-10-19

## 2017-10-19 NOTE — TELEPHONE ENCOUNTER
I called [facility] to let them know that we had approval for Owen's Lyrica. I spoke with Rosalie Rossi. They will get the prescription ready for him.

## 2017-11-27 ENCOUNTER — OFFICE VISIT (OUTPATIENT)
Dept: FAMILY MEDICINE CLINIC | Age: 37
End: 2017-11-27

## 2017-11-27 VITALS
BODY MASS INDEX: 48.69 KG/M2 | OXYGEN SATURATION: 96 % | TEMPERATURE: 98.3 F | DIASTOLIC BLOOD PRESSURE: 84 MMHG | HEART RATE: 98 BPM | WEIGHT: 315 LBS | RESPIRATION RATE: 16 BRPM | SYSTOLIC BLOOD PRESSURE: 138 MMHG

## 2017-11-27 DIAGNOSIS — E78.2 MIXED HYPERLIPIDEMIA: ICD-10-CM

## 2017-11-27 DIAGNOSIS — F33.1 MODERATE EPISODE OF RECURRENT MAJOR DEPRESSIVE DISORDER (HCC): ICD-10-CM

## 2017-11-27 DIAGNOSIS — I10 ESSENTIAL HYPERTENSION: Primary | ICD-10-CM

## 2017-11-27 DIAGNOSIS — M54.41 CHRONIC BILATERAL LOW BACK PAIN WITH RIGHT-SIDED SCIATICA: ICD-10-CM

## 2017-11-27 DIAGNOSIS — G89.29 CHRONIC BILATERAL LOW BACK PAIN WITH RIGHT-SIDED SCIATICA: ICD-10-CM

## 2017-11-27 PROCEDURE — 99214 OFFICE O/P EST MOD 30 MIN: CPT | Performed by: NURSE PRACTITIONER

## 2017-11-27 PROCEDURE — 4004F PT TOBACCO SCREEN RCVD TLK: CPT | Performed by: NURSE PRACTITIONER

## 2017-11-27 PROCEDURE — G8484 FLU IMMUNIZE NO ADMIN: HCPCS | Performed by: NURSE PRACTITIONER

## 2017-11-27 PROCEDURE — G8427 DOCREV CUR MEDS BY ELIG CLIN: HCPCS | Performed by: NURSE PRACTITIONER

## 2017-11-27 PROCEDURE — 96127 BRIEF EMOTIONAL/BEHAV ASSMT: CPT | Performed by: NURSE PRACTITIONER

## 2017-11-27 PROCEDURE — G8417 CALC BMI ABV UP PARAM F/U: HCPCS | Performed by: NURSE PRACTITIONER

## 2017-11-27 RX ORDER — HYDROCHLOROTHIAZIDE 12.5 MG/1
12.5 TABLET ORAL DAILY
Qty: 30 TABLET | Refills: 5 | Status: SHIPPED | OUTPATIENT
Start: 2017-11-27 | End: 2018-04-18 | Stop reason: SDUPTHER

## 2017-11-27 RX ORDER — VENLAFAXINE HYDROCHLORIDE 75 MG/1
225 CAPSULE, EXTENDED RELEASE ORAL DAILY
Qty: 90 CAPSULE | Refills: 0 | Status: SHIPPED | OUTPATIENT
Start: 2017-11-27 | End: 2018-04-18 | Stop reason: SDUPTHER

## 2017-11-27 RX ORDER — ASPIRIN 81 MG/1
81 TABLET, CHEWABLE ORAL DAILY
Qty: 30 TABLET | Refills: 5 | Status: SHIPPED | OUTPATIENT
Start: 2017-11-27 | End: 2021-11-22

## 2017-11-27 RX ORDER — ATORVASTATIN CALCIUM 40 MG/1
40 TABLET, FILM COATED ORAL DAILY
Qty: 30 TABLET | Refills: 5 | Status: SHIPPED | OUTPATIENT
Start: 2017-11-27 | End: 2018-04-18 | Stop reason: SDUPTHER

## 2017-11-27 RX ORDER — CARVEDILOL 3.12 MG/1
3.12 TABLET ORAL 2 TIMES DAILY WITH MEALS
Qty: 60 TABLET | Refills: 5 | Status: SHIPPED | OUTPATIENT
Start: 2017-11-27 | End: 2017-12-26 | Stop reason: SDUPTHER

## 2017-11-27 ASSESSMENT — ENCOUNTER SYMPTOMS
BOWEL INCONTINENCE: 0
BACK PAIN: 1
SHORTNESS OF BREATH: 0
ORTHOPNEA: 0
BLURRED VISION: 0

## 2017-11-27 ASSESSMENT — PATIENT HEALTH QUESTIONNAIRE - PHQ9
SUM OF ALL RESPONSES TO PHQ9 QUESTIONS 1 & 2: 6
1. LITTLE INTEREST OR PLEASURE IN DOING THINGS: 3
5. POOR APPETITE OR OVEREATING: 3
4. FEELING TIRED OR HAVING LITTLE ENERGY: 3
7. TROUBLE CONCENTRATING ON THINGS, SUCH AS READING THE NEWSPAPER OR WATCHING TELEVISION: 2
2. FEELING DOWN, DEPRESSED OR HOPELESS: 3
6. FEELING BAD ABOUT YOURSELF - OR THAT YOU ARE A FAILURE OR HAVE LET YOURSELF OR YOUR FAMILY DOWN: 1
SUM OF ALL RESPONSES TO PHQ QUESTIONS 1-9: 18
8. MOVING OR SPEAKING SO SLOWLY THAT OTHER PEOPLE COULD HAVE NOTICED. OR THE OPPOSITE, BEING SO FIGETY OR RESTLESS THAT YOU HAVE BEEN MOVING AROUND A LOT MORE THAN USUAL: 0
9. THOUGHTS THAT YOU WOULD BE BETTER OFF DEAD, OR OF HURTING YOURSELF: 0
3. TROUBLE FALLING OR STAYING ASLEEP: 3
10. IF YOU CHECKED OFF ANY PROBLEMS, HOW DIFFICULT HAVE THESE PROBLEMS MADE IT FOR YOU TO DO YOUR WORK, TAKE CARE OF THINGS AT HOME, OR GET ALONG WITH OTHER PEOPLE: 1

## 2017-11-27 NOTE — PROGRESS NOTES
[address] [facility], [facility]  [address], [address]  Phone: [phone]  Fax: [phone]    Vinny Hill is a 40 y.o. male who presents today for his medical conditions/complaints as noted below. Vinny Hill c/o of Hypertension (3 mos f/u); Back Pain (3 mof f/u); and Discuss Medications (discuss med dose changes)    Patient would like to increase his Effexor dose. HPI:             Hypertension   This is a chronic problem. The current episode started more than 1 year ago. Progression since onset: stable. The problem is controlled. Pertinent negatives include no anxiety, blurred vision, chest pain, headaches, malaise/fatigue, neck pain, orthopnea, palpitations, peripheral edema, PND, shortness of breath or sweats. There are no associated agents to hypertension. Risk factors for coronary artery disease include male gender, obesity and dyslipidemia. Past treatments include beta blockers and diuretics. The current treatment provides significant improvement. There are no compliance problems. Back Pain   This is a chronic problem. The current episode started more than 1 year ago. The problem occurs daily. The problem is unchanged. The pain is present in the lumbar spine. The quality of the pain is described as shooting. The pain radiates to the right thigh. Pain scale: varues. The symptoms are aggravated by bending. Associated symptoms include leg pain. Pertinent negatives include no bladder incontinence, bowel incontinence, chest pain, headaches or pelvic pain. Risk factors include obesity. He has tried muscle relaxant for the symptoms. The treatment provided mild relief. Mental Health Problem   The primary symptoms include dysphoric mood. The primary symptoms do not include hallucinations. Additional symptoms of the illness include agitation. Additional symptoms of the illness do not include headaches.      PHQ-2 Over the past 2 weeks, how often have you been bothered by any of the following problems? Little interest or pleasure in doing things Nearly every day     Feeling down, depressed, or hopeless Nearly every day     PHQ-2 Score 6 (calculated)     PHQ-9 Over the past 2 weeks, how often have you been bothered by any of the following problems? Trouble falling or staying asleep, or sleeping too much Nearly every day     Feeling tired or having little energy Nearly every day     Poor appetite or overeating Nearly every day     Feeling bad about yourself - or that you are a failure or have let yourself or your family down Several days     Trouble concentrating on things, such as reading the newspaper or watching television More than half the days  by Sangeeta Cortez CNP  at 11/27/17 1434    Moving or speaking so slowly that other people could have noticed. Or the opposite - being so fidgety or restless that you have been moving around a lot more than usual Not at all  by Sangeeta Cortez CNP  at 11/27/17 1434    Thoughts that you would be better off dead, or of hurting yourself in some way   Not at all   (1 or 2 times in the past month)  by Sangeeta Cortez CNP  at 11/27/17 1434    If you checked off any problems, how difficult have these problems made it for you to do your work, take care of things at home, or get along with other people? Somewhat difficult  by Sangeeta Cortez CNP  at 11/27/17 1434    PHQ-9 Completed? Complete  by Sangeeta Cortez CNP  at 11/27/17 1434    PHQ-9 Total Score 18 (calculated)  by Sangeeta Cortez CNP  at 11/27/17 1434      Patient recently found out his wife was cheating on him. He has been very distressed about this. He is currently filing for divorce.      BP Readings from Last 3 Encounters:   11/27/17 138/84   09/25/17 (!) 161/97   09/15/17 132/78     Wt Readings from Last 3 Encounters:   11/27/17 (!) 400 lb (181.4 kg)   09/25/17 (!) 400 lb 9.6 oz (181.7 kg)   09/15/17 (!) 414 lb (187.8 kg) Past Medical History:   Diagnosis Date    Anxiety     Depression     including hospitalization    Extreme obesity ([address]     Headache     Hyperlipidemia     Hypertension     Kidney stones     Neuropathy (HCC)       Past Surgical History:   Procedure Laterality Date    HERNIA REPAIR      LITHOTRIPSY       Family History   Problem Relation Age of Onset    High Blood Pressure Father     Alzheimer's Disease Maternal Grandmother      Social History   Substance Use Topics    Smoking status: Never Smoker    Smokeless tobacco: Current User     Types: Chew    Alcohol use 0.0 oz/week      Comment: occasionally      Current Outpatient Prescriptions   Medication Sig Dispense Refill    venlafaxine (EFFEXOR XR) 75 MG extended release capsule Take 3 capsules by mouth daily 90 capsule 0    carvedilol (COREG) 3.125 MG tablet Take 1 tablet by mouth 2 times daily (with meals) 60 tablet 5    atorvastatin (LIPITOR) 40 MG tablet Take 1 tablet by mouth daily 30 tablet 5    aspirin 81 MG chewable tablet Take 1 tablet by mouth daily 30 tablet 5    hydrochlorothiazide (HYDRODIURIL) 12.5 MG tablet Take 1 tablet by mouth daily 30 tablet 5    pregabalin (LYRICA) 50 MG capsule Take 1 capsule by mouth 3 times daily (Patient taking differently: Take 50 mg by mouth 3 times daily  .) 90 capsule 3    tiZANidine (ZANAFLEX) 2 MG tablet Take 1 tablet by mouth every 6 hours as needed (muscle spasm) 80 tablet 0     No current facility-administered medications for this visit. No Known Allergies      Subjective:      Review of Systems   Constitutional: Negative for malaise/fatigue. Eyes: Negative for blurred vision. Respiratory: Negative for shortness of breath. Cardiovascular: Negative for chest pain, palpitations, orthopnea and PND. Gastrointestinal: Negative for bowel incontinence. Genitourinary: Negative for bladder incontinence and pelvic pain. Musculoskeletal: Positive for back pain.  Negative for neck pain. Neurological: Negative for headaches. Psychiatric/Behavioral: Positive for agitation, behavioral problems (just placed on probation), decreased concentration, dysphoric mood and sleep disturbance. Negative for hallucinations and suicidal ideas. Objective:     /84 (Site: Left Arm, Position: Sitting, Cuff Size: Large Adult)   Pulse 98   Temp 98.3 °F (36.8 °C) (Tympanic)   Resp 16   Wt (!) 400 lb (181.4 kg)   SpO2 96%   BMI 48.69 kg/m²     Physical Exam   Constitutional: He is oriented to person, place, and time. Vital signs are normal. He appears well-developed and well-nourished. Non-toxic appearance. He does not have a sickly appearance. He does not appear ill. No distress. HENT:   Head: Normocephalic. Right Ear: Hearing and external ear normal.   Left Ear: Hearing and external ear normal.   Nose: No mucosal edema or rhinorrhea. Mouth/Throat: Uvula is midline and mucous membranes are normal. Mucous membranes are not pale and not dry. Eyes: Conjunctivae, EOM and lids are normal. Right eye exhibits no discharge. Left eye exhibits no discharge. No scleral icterus. Right eye exhibits no nystagmus. Left eye exhibits no nystagmus. Neck: Trachea normal, normal range of motion and full passive range of motion without pain. Neck supple. No thyroid mass present. Cardiovascular: Normal rate, regular rhythm, S1 normal, S2 normal and normal heart sounds. Pulmonary/Chest: Effort normal and breath sounds normal. No accessory muscle usage. No respiratory distress. Musculoskeletal: Normal range of motion. Neurological: He is alert and oriented to person, place, and time. Skin: Skin is warm, dry and intact. He is not diaphoretic. No pallor. Psychiatric: His speech is normal and behavior is normal. Judgment and thought content normal. His affect is not inappropriate. Cognition and memory are normal. He exhibits a depressed mood.    Nursing note and vitals

## 2017-11-27 NOTE — PATIENT INSTRUCTIONS
Patient Education   Increase dose of Effexor to 225 mg daily. Follow up in 1 month for depression. Follow up in 6 months for the hypertension. Please find out what hospital you were at so we can get records. Recovering From Depression: Care Instructions  Your Care Instructions  Taking good care of yourself is important as you recover from depression. In time, your symptoms will fade as your treatment takes hold. Do not give up. Instead, focus your energy on getting better. Your mood will improve. It just takes some time. Focus on things that can help you feel better, such as being with friends and family, eating well, and getting enough rest. But take things slowly. Do not do too much too soon. You will begin to feel better gradually. Follow-up care is a key part of your treatment and safety. Be sure to make and go to all appointments, and call your doctor if you are having problems. It's also a good idea to know your test results and keep a list of the medicines you take. How can you care for yourself at home? Be realistic  · If you have a large task to do, break it up into smaller steps you can handle, and just do what you can. · You may want to put off important decisions until your depression has lifted. If you have plans that will have a major impact on your life, such as marriage, divorce, or a job change, try to wait a bit. Talk it over with friends and loved ones who can help you look at the overall picture first.  · Reaching out to people for help is important. Do not isolate yourself. Let your family and friends help you. Find someone you can trust and confide in, and talk to that person. · Be patient, and be kind to yourself. Remember that depression is not your fault and is not something you can overcome with willpower alone. Treatment is necessary for depression, just like for any other illness. Feeling better takes time, and your mood will improve little by little.   Stay active  · Stay busy and get outside. Take a walk, or try some other light exercise. · Talk with your doctor about an exercise program. Exercise can help with mild depression. · Go to a movie or concert. Take part in a Jew activity or other social gathering. Go to a ball game. · Ask a friend to have dinner with you. Take care of yourself  · Eat a balanced diet with plenty of fresh fruits and vegetables, whole grains, and lean protein. If you have lost your appetite, eat small snacks rather than large meals. · Avoid drinking alcohol or using illegal drugs. Do not take medicines that have not been prescribed for you. They may interfere with medicines you may be taking for depression, or they may make your depression worse. · Take your medicines exactly as they are prescribed. You may start to feel better within 1 to 3 weeks of taking antidepressant medicine. But it can take as many as 6 to 8 weeks to see more improvement. If you have questions or concerns about your medicines, or if you do not notice any improvement by 3 weeks, talk to your doctor. · If you have any side effects from your medicine, tell your doctor. Antidepressants can make you feel tired, dizzy, or nervous. Some people have dry mouth, constipation, headaches, sexual problems, or diarrhea. Many of these side effects are mild and will go away on their own after you have been taking the medicine for a few weeks. Some may last longer. Talk to your doctor if side effects are bothering you too much. You might be able to try a different medicine. · Get enough sleep. If you have problems sleeping:  ¨ Go to bed at the same time every night, and get up at the same time every morning. ¨ Keep your bedroom dark and quiet. ¨ Do not exercise after 5:00 p.m. ¨ Avoid drinks with caffeine after 5:00 p.m. · Avoid sleeping pills unless they are prescribed by the doctor treating your depression.  Sleeping pills may make you groggy during the day, and they may interact with other medicine you are taking. · If you have any other illnesses, such as diabetes, heart disease, or high blood pressure, make sure to continue with your treatment. Tell your doctor about all of the medicines you take, including those with or without a prescription. · Keep the numbers for these national suicide hotlines: 9-862-657-TALK (0-647.536.4287) and 5-993-FBWOMEL (5-939.293.5025). If you or someone you know talks about suicide or feeling hopeless, get help right away. When should you call for help? Call 911 anytime you think you may need emergency care. For example, call if:  · You feel like hurting yourself or someone else. · Someone you know has depression and is about to attempt or is attempting suicide. Call your doctor now or seek immediate medical care if:  · You hear voices. · Someone you know has depression and:  ¨ Starts to give away his or her possessions. ¨ Uses illegal drugs or drinks alcohol heavily. ¨ Talks or writes about death, including writing suicide notes or talking about guns, knives, or pills. ¨ Starts to spend a lot of time alone. ¨ Acts very aggressively or suddenly appears calm. Watch closely for changes in your health, and be sure to contact your doctor if:  · You do not get better as expected. Where can you learn more? Go to https://[employer].michaeleb.[employer]ur [employer]. org and sign in to your "[employer] account. Enter Q095 in the Tupalo box to learn more about \"Recovering From Depression: Care Instructions. \"     If you do not have an account, please click on the \"Sign Up Now\" link. Current as of: July 26, 2016  Content Version: 11.3  © 1277-0936 [employer]. Care instructions adapted under license by [facility] ([facility]). If you have questions about a medical condition or this instruction, always ask your healthcare professional. [address] any warranty or liability for your use of this information.        Patient Education for a few weeks. Some may last longer. Talk to your doctor if side effects bother you too much. You might be able to try a different medicine. If you are pregnant or breastfeeding, talk to your doctor about what medicines you can take. Learn about counseling  In many cases, counseling can work as well as medicines to treat mild to moderate depression. Counseling is done by licensed mental health providers, such as psychologists, social workers, and some types of nurses. It can be done in one-on-one sessions or in a group setting. Many people find group sessions helpful. Cognitive-behavioral therapy is a type of counseling. In this treatment therapy, you learn how to see and change unhelpful thinking styles that may be adding to your depression. Counseling and medicines often work well when used together. To manage depression  · Be physically active. Getting 30 minutes of exercise each day is good for your body and your mind. Begin slowly if it is hard for you to get started. If you already exercise, keep it up. · Plan something pleasant for yourself every day. Include activities that you have enjoyed in the past.  · Get enough sleep. Talk to your doctor if you have problems sleeping. · Eat a balanced diet. If you do not feel hungry, eat small snacks rather than large meals. · Do not drink alcohol, use illegal drugs, or take medicines that your doctor has not prescribed for you. They may interfere with your treatment. · Spend time with family and friends. It may help to speak openly about your depression with people you trust.  · Take your medicines exactly as prescribed. Call your doctor if you think you are having a problem with your medicine. · Do not make major life decisions while you are depressed. Depression may change the way you think. You will be able to make better decisions after you feel better. · Think positively. Challenge negative thoughts with statements such as \"I am hopeful\";  \"Things will needed for many body functions, such as making new cells. Cholesterol is made by your body. It also comes from food you eat. High cholesterol means that you have too much of the fat in your blood. This raises your risk of a heart attack and stroke. LDL and HDL are part of your total cholesterol. LDL is the \"bad\" cholesterol. High LDL can raise your risk for heart disease, heart attack, and stroke. HDL is the \"good\" cholesterol. It helps clear bad cholesterol from the body. High HDL is linked with a lower risk of heart disease, heart attack, and stroke. Your cholesterol levels help your doctor find out your risk for having a heart attack or stroke. You and your doctor can talk about whether you need to lower your risk and what treatment is best for you. A heart-healthy lifestyle along with medicines can help lower your cholesterol and your risk. The way you choose to lower your risk will depend on how high your risk is for heart attack and stroke. It will also depend on how you feel about taking medicines. Follow-up care is a key part of your treatment and safety. Be sure to make and go to all appointments, and call your doctor if you are having problems. It's also a good idea to know your test results and keep a list of the medicines you take. How can you care for yourself at home? · Eat a variety of foods every day. Good choices include fruits, vegetables, whole grains (like oatmeal), dried beans and peas, nuts and seeds, soy products (like tofu), and fat-free or low-fat dairy products. · Replace butter, margarine, and hydrogenated or partially hydrogenated oils with olive and canola oils. (Canola oil margarine without trans fat is fine.)  · Replace red meat with fish, poultry, and soy protein (like tofu). · Limit processed and packaged foods like chips, crackers, and cookies. · Bake, broil, or steam foods. Don't dee them. · Be physically active.  Get at least 30 minutes of exercise on most days of the week. Walking is a good choice. You also may want to do other activities, such as running, swimming, cycling, or playing tennis or team sports. · Stay at a healthy weight or lose weight by making the changes in eating and physical activity listed above. Losing just a small amount of weight, even 5 to 10 pounds, can reduce your risk for having a heart attack or stroke. · Do not smoke. When should you call for help? Watch closely for changes in your health, and be sure to contact your doctor if:  · You need help making lifestyle changes. · You have questions about your medicine. Where can you learn more? Go to https://Horsehead Holdingpepiceweb.[employer]. org and sign in to your [employer]i account. Enter W218 in the Novalux box to learn more about \"High Cholesterol: Care Instructions. \"     If you do not have an account, please click on the \"Sign Up Now\" link. Current as of: April 3, 2017  Content Version: 11.3  © 8344-2225 [employer]. Care instructions adapted under license by [facility] ([facility]). If you have questions about a medical condition or this instruction, always ask your healthcare professional. [place] [address] any warranty or liability for your use of this information. Patient Education        High Blood Pressure: Care Instructions  Your Care Instructions  If your blood pressure is usually above 140/90, you have high blood pressure, or hypertension. That means the top number is 140 or higher or the bottom number is 90 or higher, or both. Despite what a lot of people think, high blood pressure usually doesn't cause headaches or make you feel dizzy or lightheaded. It usually has no symptoms. But it does increase your risk for heart attack, stroke, and kidney or eye damage. The higher your blood pressure, the more your risk increases. Your doctor will give you a goal for your blood pressure. Your goal will be based on your health and your age.  An example of a goal is to keep your blood pressure below 140/90. Lifestyle changes, such as eating healthy and being active, are always important to help lower blood pressure. You might also take medicine to reach your blood pressure goal.  Follow-up care is a key part of your treatment and safety. Be sure to make and go to all appointments, and call your doctor if you are having problems. It's also a good idea to know your test results and keep a list of the medicines you take. How can you care for yourself at home? Medical treatment  · If you stop taking your medicine, your blood pressure will go back up. You may take one or more types of medicine to lower your blood pressure. Be safe with medicines. Take your medicine exactly as prescribed. Call your doctor if you think you are having a problem with your medicine. · Talk to your doctor before you start taking aspirin every day. Aspirin can help certain people lower their risk of a heart attack or stroke. But taking aspirin isn't right for everyone, because it can cause serious bleeding. · See your doctor regularly. You may need to see the doctor more often at first or until your blood pressure comes down. · If you are taking blood pressure medicine, talk to your doctor before you take decongestants or anti-inflammatory medicine, such as ibuprofen. Some of these medicines can raise blood pressure. · Learn how to check your blood pressure at home. Lifestyle changes  · Stay at a healthy weight. This is especially important if you put on weight around the waist. Losing even 10 pounds can help you lower your blood pressure. · If your doctor recommends it, get more exercise. Walking is a good choice. Bit by bit, increase the amount you walk every day. Try for at least 30 minutes on most days of the week. You also may want to swim, bike, or do other activities. · Avoid or limit alcohol. Talk to your doctor about whether you can drink any alcohol.   · Try to limit how much sodium you eat to less than 2,300 milligrams (mg) a day. Your doctor may ask you to try to eat less than 1,500 mg a day. · Eat plenty of fruits (such as bananas and oranges), vegetables, legumes, whole grains, and low-fat dairy products. · Lower the amount of saturated fat in your diet. Saturated fat is found in animal products such as milk, cheese, and meat. Limiting these foods may help you lose weight and also lower your risk for heart disease. · Do not smoke. Smoking increases your risk for heart attack and stroke. If you need help quitting, talk to your doctor about stop-smoking programs and medicines. These can increase your chances of quitting for good. When should you call for help? Call 911 anytime you think you may need emergency care. This may mean having symptoms that suggest that your blood pressure is causing a serious heart or blood vessel problem. Your blood pressure may be over 180/110. For example, call 911 if:  · You have symptoms of a heart attack. These may include:  ¨ Chest pain or pressure, or a strange feeling in the chest.  ¨ Sweating. ¨ Shortness of breath. ¨ Nausea or vomiting. ¨ Pain, pressure, or a strange feeling in the back, neck, jaw, or upper belly or in one or both shoulders or arms. ¨ Lightheadedness or sudden weakness. ¨ A fast or irregular heartbeat. · You have symptoms of a stroke. These may include:  ¨ Sudden numbness, tingling, weakness, or loss of movement in your face, arm, or leg, especially on only one side of your body. ¨ Sudden vision changes. ¨ Sudden trouble speaking. ¨ Sudden confusion or trouble understanding simple statements. ¨ Sudden problems with walking or balance. ¨ A sudden, severe headache that is different from past headaches. · You have severe back or belly pain. Do not wait until your blood pressure comes down on its own. Get help right away.   Call your doctor now or seek immediate care if:  · Your blood pressure is much higher than normal

## 2017-12-26 ENCOUNTER — APPOINTMENT (OUTPATIENT)
Dept: GENERAL RADIOLOGY | Age: 37
End: 2017-12-26
Payer: COMMERCIAL

## 2017-12-26 ENCOUNTER — HOSPITAL ENCOUNTER (EMERGENCY)
Age: 37
Discharge: HOME | End: 2017-12-26
Attending: EMERGENCY MEDICINE
Payer: COMMERCIAL

## 2017-12-26 VITALS
SYSTOLIC BLOOD PRESSURE: 156 MMHG | RESPIRATION RATE: 29 BRPM | HEART RATE: 99 BPM | TEMPERATURE: 98.4 F | OXYGEN SATURATION: 95 % | DIASTOLIC BLOOD PRESSURE: 78 MMHG

## 2017-12-26 DIAGNOSIS — J10.1 INFLUENZA A: Primary | ICD-10-CM

## 2017-12-26 DIAGNOSIS — K11.20 SIALADENITIS: ICD-10-CM

## 2017-12-26 LAB
ALBUMIN SERPL-MCNC: 3.4 GM/DL (ref 3.5–5)
ALP BLD-CCNC: 89 U/L (ref 46–116)
ALT SERPL-CCNC: 48 U/L (ref 12–78)
AMORPHOUS: ABNORMAL
ANION GAP: 11 MEQ/L (ref 8–16)
AST SERPL-CCNC: 26 U/L (ref 15–37)
BACTERIA: ABNORMAL
BASOPHILS # BLD: 0.8 % (ref 0–3)
BILIRUB SERPL-MCNC: 0.5 MG/DL (ref 0.2–1)
BILIRUBIN URINE: NEGATIVE
BLOOD, URINE: ABNORMAL
BUN BLDV-MCNC: 15 MG/DL (ref 7–18)
CASTS UA: ABNORMAL /LPF
CHARACTER, URINE: CLEAR
CHLORIDE BLD-SCNC: 105 MEQ/L (ref 98–107)
CO2: 29 MEQ/L (ref 21–32)
COLOR: ABNORMAL
CREAT SERPL-MCNC: 0.9 MG/DL (ref 0.8–1.3)
CRYSTALS, UA: ABNORMAL
EKG ATRIAL RATE: 98 BPM
EKG P AXIS: 37 DEGREES
EKG P-R INTERVAL: 158 MS
EKG Q-T INTERVAL: 342 MS
EKG QRS DURATION: 86 MS
EKG QTC CALCULATION (BAZETT): 436 MS
EKG R AXIS: 55 DEGREES
EKG T AXIS: 21 DEGREES
EKG VENTRICULAR RATE: 98 BPM
EOSINOPHILS RELATIVE PERCENT: 1 % (ref 0–4)
EPITHELIAL CELLS, UA: ABNORMAL /HPF
FLU A ANTIGEN: POSITIVE
FLU B ANTIGEN: NEGATIVE
GFR, ESTIMATED: > 90 ML/MIN/1.73M2
GLUCOSE BLD-MCNC: 120 MG/DL (ref 74–106)
GLUCOSE, URINE: NEGATIVE MG/DL
GROUP A STREP CULTURE, REFLEX: NEGATIVE
HCT VFR BLD CALC: 47.9 % (ref 42–52)
HEMOGLOBIN: 16.2 GM/DL (ref 14–18)
KETONES, URINE: NEGATIVE
LEUKOCYTE ESTERASE, URINE: NEGATIVE
LYMPHOCYTES # BLD: 18.7 % (ref 15–47)
MAGNESIUM: 1.8 MG/DL (ref 1.8–2.4)
MCH RBC QN AUTO: 30.2 PG (ref 27–31)
MCHC RBC AUTO-ENTMCNC: 33.9 GM/DL (ref 33–37)
MCV RBC AUTO: 89.3 FL (ref 80–94)
MONOCYTES: 6.6 % (ref 0–12)
MUCUS: ABNORMAL
NITRITE, URINE: NEGATIVE
PDW BLD-RTO: 11.2 % (ref 11.5–14.5)
PH UA: 5.5 (ref 5–9)
PLATELET # BLD: 172 THOU/MM3 (ref 130–400)
PMV BLD AUTO: 7.4 MCM (ref 7.4–10.4)
POC CALCIUM: 8.6 MG/DL (ref 8.5–10.1)
POTASSIUM SERPL-SCNC: 3.8 MEQ/L (ref 3.5–5.1)
PROTEIN UA: ABNORMAL MG/DL
RBC # BLD: 5.36 MILL/MM3 (ref 4.7–6.1)
RBC UA: ABNORMAL /HPF
REFLEX THROAT C + S: NORMAL
REFLEX TO URINE C & S: ABNORMAL
SEGS: 72.9 % (ref 43–75)
SODIUM BLD-SCNC: 145 MEQ/L (ref 136–145)
SPECIFIC GRAVITY UA: >= 1.03 (ref 1–1.03)
TOTAL PROTEIN: 7.3 GM/DL (ref 6.4–8.2)
TROPONIN I: 0.05 NG/ML
UROBILINOGEN, URINE: 0.2 EU/DL (ref 0–1)
WBC # BLD: 6.3 THOU/MM3 (ref 4.8–10.8)
WBC UA: ABNORMAL /HPF

## 2017-12-26 PROCEDURE — 6360000002 HC RX W HCPCS: Performed by: EMERGENCY MEDICINE

## 2017-12-26 PROCEDURE — 93005 ELECTROCARDIOGRAM TRACING: CPT

## 2017-12-26 PROCEDURE — 84484 ASSAY OF TROPONIN QUANT: CPT

## 2017-12-26 PROCEDURE — 80053 COMPREHEN METABOLIC PANEL: CPT

## 2017-12-26 PROCEDURE — 83735 ASSAY OF MAGNESIUM: CPT

## 2017-12-26 PROCEDURE — 71020 XR CHEST STANDARD TWO VW: CPT

## 2017-12-26 PROCEDURE — 87070 CULTURE OTHR SPECIMN AEROBIC: CPT

## 2017-12-26 PROCEDURE — 85025 COMPLETE CBC W/AUTO DIFF WBC: CPT

## 2017-12-26 PROCEDURE — 81001 URINALYSIS AUTO W/SCOPE: CPT

## 2017-12-26 PROCEDURE — 36415 COLL VENOUS BLD VENIPUNCTURE: CPT

## 2017-12-26 PROCEDURE — 96374 THER/PROPH/DIAG INJ IV PUSH: CPT

## 2017-12-26 PROCEDURE — 87880 STREP A ASSAY W/OPTIC: CPT

## 2017-12-26 PROCEDURE — 99285 EMERGENCY DEPT VISIT HI MDM: CPT

## 2017-12-26 PROCEDURE — 6370000000 HC RX 637 (ALT 250 FOR IP): Performed by: EMERGENCY MEDICINE

## 2017-12-26 PROCEDURE — 87804 INFLUENZA ASSAY W/OPTIC: CPT

## 2017-12-26 RX ORDER — ONDANSETRON 4 MG/1
4 TABLET, FILM COATED ORAL EVERY 8 HOURS PRN
Qty: 9 TABLET | Refills: 0 | Status: SHIPPED | OUTPATIENT
Start: 2017-12-26 | End: 2018-04-18 | Stop reason: ALTCHOICE

## 2017-12-26 RX ORDER — CYCLOBENZAPRINE HCL 10 MG
10 TABLET ORAL 3 TIMES DAILY PRN
Qty: 30 TABLET | Refills: 0 | Status: SHIPPED | OUTPATIENT
Start: 2017-12-26 | End: 2018-01-05

## 2017-12-26 RX ORDER — OSELTAMIVIR PHOSPHATE 75 MG/1
75 CAPSULE ORAL 2 TIMES DAILY
Qty: 10 CAPSULE | Refills: 0 | Status: SHIPPED | OUTPATIENT
Start: 2017-12-26 | End: 2017-12-31

## 2017-12-26 RX ORDER — OSELTAMIVIR PHOSPHATE 75 MG/1
75 CAPSULE ORAL ONCE
Status: COMPLETED | OUTPATIENT
Start: 2017-12-26 | End: 2017-12-26

## 2017-12-26 RX ORDER — CEPHALEXIN 500 MG/1
500 CAPSULE ORAL ONCE
Status: COMPLETED | OUTPATIENT
Start: 2017-12-26 | End: 2017-12-26

## 2017-12-26 RX ORDER — CARVEDILOL 3.12 MG/1
3.12 TABLET ORAL 2 TIMES DAILY WITH MEALS
Qty: 60 TABLET | Refills: 0 | Status: SHIPPED | OUTPATIENT
Start: 2017-12-26 | End: 2018-04-18 | Stop reason: SDUPTHER

## 2017-12-26 RX ORDER — CEPHALEXIN 500 MG/1
500 CAPSULE ORAL 4 TIMES DAILY
Qty: 28 CAPSULE | Refills: 0 | Status: SHIPPED | OUTPATIENT
Start: 2017-12-26 | End: 2018-01-02

## 2017-12-26 RX ORDER — KETOROLAC TROMETHAMINE 30 MG/ML
30 INJECTION, SOLUTION INTRAMUSCULAR; INTRAVENOUS ONCE
Status: COMPLETED | OUTPATIENT
Start: 2017-12-26 | End: 2017-12-26

## 2017-12-26 RX ORDER — CYCLOBENZAPRINE HCL 10 MG
10 TABLET ORAL ONCE
Status: COMPLETED | OUTPATIENT
Start: 2017-12-26 | End: 2017-12-26

## 2017-12-26 RX ORDER — HYDROCODONE BITARTRATE AND ACETAMINOPHEN 5; 325 MG/1; MG/1
1 TABLET ORAL EVERY 6 HOURS PRN
Qty: 12 TABLET | Refills: 0 | Status: SHIPPED | OUTPATIENT
Start: 2017-12-26 | End: 2018-01-02

## 2017-12-26 RX ADMIN — KETOROLAC TROMETHAMINE 30 MG: 30 INJECTION, SOLUTION INTRAMUSCULAR at 18:01

## 2017-12-26 RX ADMIN — CYCLOBENZAPRINE HYDROCHLORIDE 10 MG: 10 TABLET, FILM COATED ORAL at 20:13

## 2017-12-26 RX ADMIN — OSELTAMIVIR PHOSPHATE 75 MG: 75 CAPSULE ORAL at 20:13

## 2017-12-26 RX ADMIN — CEPHALEXIN 500 MG: 500 CAPSULE ORAL at 20:13

## 2017-12-26 ASSESSMENT — PAIN DESCRIPTION - FREQUENCY: FREQUENCY: CONTINUOUS

## 2017-12-26 ASSESSMENT — ENCOUNTER SYMPTOMS
VOMITING: 0
COUGH: 1
ABDOMINAL PAIN: 0
DIARRHEA: 0
SHORTNESS OF BREATH: 0
WHEEZING: 0
SORE THROAT: 0
BACK PAIN: 1

## 2017-12-26 ASSESSMENT — PAIN DESCRIPTION - PAIN TYPE
TYPE: ACUTE PAIN
TYPE: ACUTE PAIN

## 2017-12-26 ASSESSMENT — PAIN DESCRIPTION - LOCATION: LOCATION: BACK

## 2017-12-26 ASSESSMENT — PAIN DESCRIPTION - ONSET: ONSET: AWAKENED FROM SLEEP

## 2017-12-26 ASSESSMENT — PAIN DESCRIPTION - PROGRESSION
CLINICAL_PROGRESSION: NOT CHANGED
CLINICAL_PROGRESSION: NOT CHANGED

## 2017-12-26 ASSESSMENT — PAIN DESCRIPTION - DESCRIPTORS: DESCRIPTORS: ACHING

## 2017-12-26 ASSESSMENT — PAIN SCALES - GENERAL
PAINLEVEL_OUTOF10: 7
PAINLEVEL_OUTOF10: 6

## 2017-12-26 NOTE — ED PROVIDER NOTES
daily    ATORVASTATIN (LIPITOR) 40 MG TABLET    Take 1 tablet by mouth daily    CARVEDILOL (COREG) 3.125 MG TABLET    Take 1 tablet by mouth 2 times daily (with meals)    HYDROCHLOROTHIAZIDE (HYDRODIURIL) 12.5 MG TABLET    Take 1 tablet by mouth daily    PREGABALIN (LYRICA) 50 MG CAPSULE    Take 1 capsule by mouth 3 times daily    TIZANIDINE (ZANAFLEX) 2 MG TABLET    Take 1 tablet by mouth every 6 hours as needed (muscle spasm)    VENLAFAXINE (EFFEXOR XR) 75 MG EXTENDED RELEASE CAPSULE    Take 3 capsules by mouth daily       ALLERGIES    has no allergies on file. FAMILY HISTORY    indicated that his mother is alive. He indicated that his father is alive. He indicated that the status of his maternal grandmother is unknown.    family history includes Alzheimer's Disease in his maternal grandmother; High Blood Pressure in his father. SOCIAL HISTORY     reports that he has never smoked. His smokeless tobacco use includes Chew. He reports that he drinks alcohol. He reports that he does not use drugs. PHYSICAL EXAM       INITIAL VITALS: BP (!) 156/78   Pulse 99   Temp 98.4 °F (36.9 °C) (Temporal)   Resp 29   SpO2 95%      Physical Exam   Constitutional: He appears well-developed and well-nourished. He appears distressed. Morbidly obese   HENT:   Head: Normocephalic and atraumatic. Right Ear: External ear normal.   Left Ear: External ear normal.   Mouth/Throat: Oropharynx is clear and moist.   Nasal congestion, tender, enlarge right parotid gland, no erythema. No obvious carious teeth. Eyes: Conjunctivae are normal. Pupils are equal, round, and reactive to light. Neck: Neck supple. Cardiovascular: Normal rate and regular rhythm. No murmur heard. Pulmonary/Chest: Effort normal and breath sounds normal. No respiratory distress. He has no wheezes. Abdominal: Soft. Bowel sounds are normal. He exhibits no mass. There is no tenderness. Musculoskeletal: He exhibits no edema or tenderness. cramps in back. Test results and plan of care discussed. FINAL IMPRESSION      1. Influenza A    2. Sialadenitis        DISPOSITION/PLAN    DISPOSITION       Pre-hypertension/Hypertension: The patient has been informed that they may have pre-hypertension or Hypertension based on a blood pressure reading in the emergency department. I recommend that the patient call the primary care provider listed on their discharge instructions or a physician of their choice this week to arrange follow up for further evaluation of possible pre-hypertension or Hypertension. Home with instructions and prescriptions, off work note.        PATIENT REFERRED TO:    Jerrell Lui, CNP  [address] [facility] [address] [phone]    Schedule an appointment as soon as possible for a visit         DISCHARGE MEDICATIONS:    Discharge Medication List as of 12/26/2017  7:58 PM      START taking these medications    Details   oseltamivir (TAMIFLU) 75 MG capsule Take 1 capsule by mouth 2 times daily for 5 days, Disp-10 capsule, R-0Print      cyclobenzaprine (FLEXERIL) 10 MG tablet Take 1 tablet by mouth 3 times daily as needed for Muscle spasms, Disp-30 tablet, R-0Print      HYDROcodone-acetaminophen (NORCO) 5-325 MG per tablet Take 1 tablet by mouth every 6 hours as needed for Pain ., Disp-12 tablet, R-0Print      cephALEXin (KEFLEX) 500 MG capsule Take 1 capsule by mouth 4 times daily for 7 days For infected gland in neck, Disp-28 capsule, R-0Print      ondansetron (ZOFRAN) 4 MG tablet Take 1 tablet by mouth every 8 hours as needed for Nausea or Vomiting, Disp-9 tablet, R-0Print                (Please note that portions of this note were completed with a voice recognition program.  Efforts were made to edit the dictations but occasionally words are mis-transcribed.)      Daily Wooten MD  12/26/17 6671 Mauri Macdonald MD  12/26/17 7656

## 2017-12-26 NOTE — ED NOTES
Patient is much more comfortable. He is relieved that IV is placed. Rresting with eyes closed.      Ralph Eller RN  12/26/17 3597

## 2017-12-27 NOTE — ED NOTES
Pt given meds, prescriptions, and discharge instructions. Pt verbalized understanding and use of meds. Pt left ambulatory per self. Pt in stable condition.       Gadiel Alvarado RN  12/26/17 [id number]

## 2017-12-28 LAB — THROAT/NOSE CULTURE: NORMAL

## 2018-03-29 ENCOUNTER — TELEPHONE (OUTPATIENT)
Dept: FAMILY MEDICINE CLINIC | Age: 38
End: 2018-03-29

## 2018-03-29 DIAGNOSIS — M54.5 CHRONIC BILATERAL LOW BACK PAIN, WITH SCIATICA PRESENCE UNSPECIFIED: Primary | ICD-10-CM

## 2018-03-29 DIAGNOSIS — G89.29 CHRONIC BILATERAL LOW BACK PAIN, WITH SCIATICA PRESENCE UNSPECIFIED: Primary | ICD-10-CM

## 2018-03-29 RX ORDER — MELOXICAM 7.5 MG/1
7.5 TABLET ORAL DAILY
Qty: 30 TABLET | Refills: 0 | Status: SHIPPED | OUTPATIENT
Start: 2018-03-29 | End: 2018-04-18 | Stop reason: DRUGHIGH

## 2018-03-29 NOTE — TELEPHONE ENCOUNTER
Pt called in and said he is having more lower back pain and has to work all weekend,  Can you send anything over to [facility] to address his pain? Muscle relaxer he is on is not working well.

## 2018-04-18 ENCOUNTER — OFFICE VISIT (OUTPATIENT)
Dept: FAMILY MEDICINE CLINIC | Age: 38
End: 2018-04-18
Payer: COMMERCIAL

## 2018-04-18 VITALS
OXYGEN SATURATION: 98 % | TEMPERATURE: 98 F | SYSTOLIC BLOOD PRESSURE: 168 MMHG | BODY MASS INDEX: 50.52 KG/M2 | DIASTOLIC BLOOD PRESSURE: 90 MMHG | WEIGHT: 315 LBS | HEART RATE: 97 BPM | RESPIRATION RATE: 16 BRPM

## 2018-04-18 DIAGNOSIS — F33.1 MODERATE EPISODE OF RECURRENT MAJOR DEPRESSIVE DISORDER (HCC): ICD-10-CM

## 2018-04-18 DIAGNOSIS — I10 ESSENTIAL HYPERTENSION: ICD-10-CM

## 2018-04-18 DIAGNOSIS — G89.29 CHRONIC BILATERAL LOW BACK PAIN, WITH SCIATICA PRESENCE UNSPECIFIED: Primary | ICD-10-CM

## 2018-04-18 DIAGNOSIS — E78.2 MIXED HYPERLIPIDEMIA: ICD-10-CM

## 2018-04-18 DIAGNOSIS — M54.5 CHRONIC BILATERAL LOW BACK PAIN, WITH SCIATICA PRESENCE UNSPECIFIED: Primary | ICD-10-CM

## 2018-04-18 PROCEDURE — 4004F PT TOBACCO SCREEN RCVD TLK: CPT | Performed by: NURSE PRACTITIONER

## 2018-04-18 PROCEDURE — G8427 DOCREV CUR MEDS BY ELIG CLIN: HCPCS | Performed by: NURSE PRACTITIONER

## 2018-04-18 PROCEDURE — 99214 OFFICE O/P EST MOD 30 MIN: CPT | Performed by: NURSE PRACTITIONER

## 2018-04-18 PROCEDURE — G8417 CALC BMI ABV UP PARAM F/U: HCPCS | Performed by: NURSE PRACTITIONER

## 2018-04-18 RX ORDER — CARVEDILOL 3.12 MG/1
3.12 TABLET ORAL 2 TIMES DAILY WITH MEALS
Qty: 60 TABLET | Refills: 1 | Status: SHIPPED | OUTPATIENT
Start: 2018-04-18 | End: 2018-05-17 | Stop reason: SDUPTHER

## 2018-04-18 RX ORDER — MELOXICAM 15 MG/1
15 TABLET ORAL DAILY
Qty: 90 TABLET | Refills: 0 | Status: SHIPPED | OUTPATIENT
Start: 2018-04-18 | End: 2018-05-17 | Stop reason: SDUPTHER

## 2018-04-18 RX ORDER — HYDROCHLOROTHIAZIDE 12.5 MG/1
12.5 TABLET ORAL DAILY
Qty: 30 TABLET | Refills: 1 | Status: SHIPPED | OUTPATIENT
Start: 2018-04-18 | End: 2018-05-17 | Stop reason: SDUPTHER

## 2018-04-18 RX ORDER — ATORVASTATIN CALCIUM 40 MG/1
40 TABLET, FILM COATED ORAL DAILY
Qty: 90 TABLET | Refills: 1 | Status: SHIPPED | OUTPATIENT
Start: 2018-04-18 | End: 2018-05-17 | Stop reason: SDUPTHER

## 2018-04-18 RX ORDER — VENLAFAXINE HYDROCHLORIDE 75 MG/1
225 CAPSULE, EXTENDED RELEASE ORAL DAILY
Qty: 270 CAPSULE | Refills: 0 | Status: SHIPPED | OUTPATIENT
Start: 2018-04-18 | End: 2018-05-17 | Stop reason: SDUPTHER

## 2018-04-18 RX ORDER — CYCLOBENZAPRINE HCL 10 MG
10 TABLET ORAL NIGHTLY PRN
Qty: 90 TABLET | Refills: 0 | Status: SHIPPED | OUTPATIENT
Start: 2018-04-18 | End: 2018-07-17

## 2018-04-18 ASSESSMENT — ENCOUNTER SYMPTOMS
BOWEL INCONTINENCE: 0
BACK PAIN: 1

## 2018-05-17 ENCOUNTER — OFFICE VISIT (OUTPATIENT)
Dept: FAMILY MEDICINE CLINIC | Age: 38
End: 2018-05-17
Payer: COMMERCIAL

## 2018-05-17 VITALS
BODY MASS INDEX: 51.49 KG/M2 | HEART RATE: 92 BPM | WEIGHT: 315 LBS | SYSTOLIC BLOOD PRESSURE: 138 MMHG | RESPIRATION RATE: 16 BRPM | TEMPERATURE: 97.8 F | DIASTOLIC BLOOD PRESSURE: 80 MMHG | OXYGEN SATURATION: 95 %

## 2018-05-17 DIAGNOSIS — G89.29 CHRONIC BILATERAL LOW BACK PAIN, WITH SCIATICA PRESENCE UNSPECIFIED: ICD-10-CM

## 2018-05-17 DIAGNOSIS — I10 ESSENTIAL HYPERTENSION: ICD-10-CM

## 2018-05-17 DIAGNOSIS — E78.2 MIXED HYPERLIPIDEMIA: ICD-10-CM

## 2018-05-17 DIAGNOSIS — M54.5 CHRONIC BILATERAL LOW BACK PAIN, WITH SCIATICA PRESENCE UNSPECIFIED: ICD-10-CM

## 2018-05-17 DIAGNOSIS — F33.1 MODERATE EPISODE OF RECURRENT MAJOR DEPRESSIVE DISORDER (HCC): ICD-10-CM

## 2018-05-17 PROCEDURE — G8417 CALC BMI ABV UP PARAM F/U: HCPCS | Performed by: NURSE PRACTITIONER

## 2018-05-17 PROCEDURE — G8427 DOCREV CUR MEDS BY ELIG CLIN: HCPCS | Performed by: NURSE PRACTITIONER

## 2018-05-17 PROCEDURE — 4004F PT TOBACCO SCREEN RCVD TLK: CPT | Performed by: NURSE PRACTITIONER

## 2018-05-17 PROCEDURE — 99214 OFFICE O/P EST MOD 30 MIN: CPT | Performed by: NURSE PRACTITIONER

## 2018-05-17 RX ORDER — ATORVASTATIN CALCIUM 40 MG/1
40 TABLET, FILM COATED ORAL DAILY
Qty: 31 TABLET | Refills: 5 | Status: SHIPPED | OUTPATIENT
Start: 2018-05-17 | End: 2019-04-16 | Stop reason: ALTCHOICE

## 2018-05-17 RX ORDER — MELOXICAM 15 MG/1
15 TABLET ORAL DAILY
Qty: 90 TABLET | Refills: 1 | Status: SHIPPED | OUTPATIENT
Start: 2018-05-17 | End: 2019-04-16

## 2018-05-17 RX ORDER — CARVEDILOL 3.12 MG/1
3.12 TABLET ORAL 2 TIMES DAILY WITH MEALS
Qty: 180 TABLET | Refills: 1 | Status: SHIPPED | OUTPATIENT
Start: 2018-05-17 | End: 2019-01-11 | Stop reason: SDUPTHER

## 2018-05-17 RX ORDER — VENLAFAXINE HYDROCHLORIDE 75 MG/1
225 CAPSULE, EXTENDED RELEASE ORAL DAILY
Qty: 270 CAPSULE | Refills: 1 | Status: SHIPPED | OUTPATIENT
Start: 2018-05-17 | End: 2019-07-08 | Stop reason: SDUPTHER

## 2018-05-17 RX ORDER — HYDROCHLOROTHIAZIDE 12.5 MG/1
12.5 TABLET ORAL DAILY
Qty: 90 TABLET | Refills: 1 | Status: SHIPPED | OUTPATIENT
Start: 2018-05-17 | End: 2019-01-11 | Stop reason: SDUPTHER

## 2018-05-22 ASSESSMENT — ENCOUNTER SYMPTOMS
COUGH: 0
ORTHOPNEA: 0
BLURRED VISION: 0
SHORTNESS OF BREATH: 0
BACK PAIN: 1

## 2018-10-11 ENCOUNTER — OFFICE VISIT (OUTPATIENT)
Dept: FAMILY MEDICINE CLINIC | Age: 38
End: 2018-10-11
Payer: COMMERCIAL

## 2018-10-11 DIAGNOSIS — I10 ESSENTIAL HYPERTENSION: ICD-10-CM

## 2018-10-11 DIAGNOSIS — M54.41 CHRONIC RIGHT-SIDED LOW BACK PAIN WITH RIGHT-SIDED SCIATICA: ICD-10-CM

## 2018-10-11 DIAGNOSIS — G89.29 CHRONIC RIGHT-SIDED LOW BACK PAIN WITH RIGHT-SIDED SCIATICA: ICD-10-CM

## 2018-10-11 DIAGNOSIS — Z76.89 ENCOUNTER TO ESTABLISH CARE: Primary | ICD-10-CM

## 2018-10-11 DIAGNOSIS — M62.830 LUMBAR PARASPINAL MUSCLE SPASM: ICD-10-CM

## 2018-10-11 DIAGNOSIS — R35.0 FREQUENCY OF URINATION: ICD-10-CM

## 2018-10-11 DIAGNOSIS — E66.01 MORBID OBESITY WITH BMI OF 45.0-49.9, ADULT (HCC): ICD-10-CM

## 2018-10-11 DIAGNOSIS — R10.31 RIGHT GROIN PAIN: ICD-10-CM

## 2018-10-11 DIAGNOSIS — N50.811 TESTICULAR PAIN, RIGHT: ICD-10-CM

## 2018-10-11 LAB
APPEARANCE FLUID: CLEAR
BILIRUBIN, POC: NORMAL
BLOOD URINE, POC: NORMAL
CLARITY, POC: CLEAR
COLOR, POC: YELLOW
GLUCOSE URINE, POC: NORMAL
KETONES, POC: NORMAL
LEUKOCYTE EST, POC: NORMAL
NITRITE, POC: NORMAL
PH, POC: 6.5
PROTEIN, POC: NORMAL
SPECIFIC GRAVITY, POC: 1.01
UROBILINOGEN, POC: NORMAL

## 2018-10-11 PROCEDURE — G8484 FLU IMMUNIZE NO ADMIN: HCPCS | Performed by: NURSE PRACTITIONER

## 2018-10-11 PROCEDURE — 4004F PT TOBACCO SCREEN RCVD TLK: CPT | Performed by: NURSE PRACTITIONER

## 2018-10-11 PROCEDURE — G8417 CALC BMI ABV UP PARAM F/U: HCPCS | Performed by: NURSE PRACTITIONER

## 2018-10-11 PROCEDURE — G8427 DOCREV CUR MEDS BY ELIG CLIN: HCPCS | Performed by: NURSE PRACTITIONER

## 2018-10-11 PROCEDURE — 81002 URINALYSIS NONAUTO W/O SCOPE: CPT | Performed by: NURSE PRACTITIONER

## 2018-10-11 PROCEDURE — 99214 OFFICE O/P EST MOD 30 MIN: CPT | Performed by: NURSE PRACTITIONER

## 2018-10-11 RX ORDER — BLOOD PRESSURE TEST KIT
1 KIT MISCELLANEOUS 2 TIMES DAILY
Qty: 1 KIT | Refills: 0 | Status: SHIPPED | OUTPATIENT
Start: 2018-10-11

## 2018-10-11 RX ORDER — TIZANIDINE HYDROCHLORIDE 2 MG/1
2 CAPSULE, GELATIN COATED ORAL 3 TIMES DAILY PRN
Qty: 30 CAPSULE | Refills: 0 | Status: SHIPPED | OUTPATIENT
Start: 2018-10-11 | End: 2019-04-16 | Stop reason: ALTCHOICE

## 2018-10-11 ASSESSMENT — PATIENT HEALTH QUESTIONNAIRE - PHQ9
2. FEELING DOWN, DEPRESSED OR HOPELESS: 0
1. LITTLE INTEREST OR PLEASURE IN DOING THINGS: 0
SUM OF ALL RESPONSES TO PHQ QUESTIONS 1-9: 0
SUM OF ALL RESPONSES TO PHQ9 QUESTIONS 1 & 2: 0
SUM OF ALL RESPONSES TO PHQ QUESTIONS 1-9: 0

## 2018-10-11 ASSESSMENT — ENCOUNTER SYMPTOMS
ABDOMINAL PAIN: 0
CONSTIPATION: 0
NAUSEA: 0
BACK PAIN: 1
WHEEZING: 0
VOMITING: 0
DIARRHEA: 1
SHORTNESS OF BREATH: 0

## 2018-10-11 NOTE — PROGRESS NOTES
[address] [facility]  [address] [facility], [address] [facility]  Dept: [phone]  Dept Fax: [phone]    Charlette Ansari is a 41 y.o. male who presents todayfor his medical conditions/complaints as noted below. Charlette Ansari c/o of Back Pain (states all of my muscles are tight. has hx of spinal stenosis. hurts all of the time, feels a bit of everything, achy and sharp pains. taking mobic, but it isnt really helping. has done therapy in the past states it helped a little bit. )    HPI:   Patient presents to the office to establish care. He previously followed with JESSY Chen. He is concerned with worsening low back pain. He also reports he forgets to take his medications. He called [facility] pharmacy this morning for refills. Back Pain   This is a chronic problem. The current episode started more than 1 year ago. The problem occurs intermittently. The problem has been gradually worsening since onset. The pain is present in the lumbar spine. The quality of the pain is described as cramping. Radiates to: right hip, groin, and teste. The pain is moderate. The symptoms are aggravated by position. Stiffness is present all day. Associated symptoms include numbness (and tingling right leg). Pertinent negatives include no abdominal pain, bladder incontinence, bowel incontinence, chest pain, dysuria, fever, headaches, paresthesias, tingling or weakness. Previous treatment includes: physical therapy, pain management in [place], neurosurgical consult- not a surgical candidate. Hypertension:  Patient here for follow-up of elevated blood pressure. He is exercising daily- walks and lifts weights. He is adherent to a low-salt diet. Blood pressure not monitored at home. Cardiac symptoms: none.  Patient denies: chest pain, chest pressure/discomfort, claudication, dyspnea, exertional chest pressure/discomfort, fatigue, lower extremity edema, near-syncope, orthopnea, Testicles    POCT Urinalysis no Micro       Orders Placed This Encounter   Medications    Blood Pressure KIT     Si Units by Does not apply route 2 times daily     Dispense:  1 kit     Refill:  0    tiZANidine (ZANAFLEX) 2 MG capsule     Sig: Take 1 capsule by mouth 3 times daily as needed for Muscle spasms     Dispense:  30 capsule     Refill:  0      Return in about 2 weeks (around 10/25/2018). Will get stat US to r/o torsion. Instructed to take medications as prescribed. Check blood pressure daily, keep log, and decrease salt in diet. Bring log to follow up appointment in 2 weeks. Discussed use, benefit, and side effects of prescribed medications. All patient questions answered. Pt voiced understanding. Health Maintenance reviewed - refused flu vaccine. Instructed to continue current medications, diet and exercise. Patient agreed with treatment plan. Follow up asdirected.      Electronically signed by GORAN Sexton CNP on 10/19/2018

## 2018-10-19 VITALS
HEART RATE: 83 BPM | OXYGEN SATURATION: 95 % | BODY MASS INDEX: 51.76 KG/M2 | DIASTOLIC BLOOD PRESSURE: 102 MMHG | WEIGHT: 315 LBS | SYSTOLIC BLOOD PRESSURE: 172 MMHG

## 2018-10-19 PROBLEM — R10.31 RIGHT GROIN PAIN: Status: ACTIVE | Noted: 2018-10-19

## 2018-10-19 PROBLEM — M62.830 LUMBAR PARASPINAL MUSCLE SPASM: Status: ACTIVE | Noted: 2018-10-19

## 2018-10-19 ASSESSMENT — ENCOUNTER SYMPTOMS: BOWEL INCONTINENCE: 0

## 2019-01-11 DIAGNOSIS — I10 ESSENTIAL HYPERTENSION: ICD-10-CM

## 2019-01-12 RX ORDER — HYDROCHLOROTHIAZIDE 12.5 MG/1
12.5 TABLET ORAL DAILY
Qty: 90 TABLET | Refills: 1 | Status: SHIPPED | OUTPATIENT
Start: 2019-01-12 | End: 2019-07-09 | Stop reason: SDUPTHER

## 2019-01-12 RX ORDER — CARVEDILOL 3.12 MG/1
3.12 TABLET ORAL 2 TIMES DAILY WITH MEALS
Qty: 180 TABLET | Refills: 1 | Status: SHIPPED | OUTPATIENT
Start: 2019-01-12 | End: 2019-07-08 | Stop reason: SDUPTHER

## 2019-04-16 ENCOUNTER — OFFICE VISIT (OUTPATIENT)
Dept: FAMILY MEDICINE CLINIC | Age: 39
End: 2019-04-16
Payer: COMMERCIAL

## 2019-04-16 VITALS
DIASTOLIC BLOOD PRESSURE: 88 MMHG | BODY MASS INDEX: 38.36 KG/M2 | HEIGHT: 76 IN | SYSTOLIC BLOOD PRESSURE: 138 MMHG | WEIGHT: 315 LBS | HEART RATE: 88 BPM

## 2019-04-16 DIAGNOSIS — Z13.220 SCREENING FOR HYPERLIPIDEMIA: ICD-10-CM

## 2019-04-16 DIAGNOSIS — M62.838 CERVICAL PARASPINOUS MUSCLE SPASM: ICD-10-CM

## 2019-04-16 DIAGNOSIS — M48.02 SPINAL STENOSIS OF CERVICAL REGION: Primary | ICD-10-CM

## 2019-04-16 PROCEDURE — G8427 DOCREV CUR MEDS BY ELIG CLIN: HCPCS | Performed by: NURSE PRACTITIONER

## 2019-04-16 PROCEDURE — 4004F PT TOBACCO SCREEN RCVD TLK: CPT | Performed by: NURSE PRACTITIONER

## 2019-04-16 PROCEDURE — 99213 OFFICE O/P EST LOW 20 MIN: CPT | Performed by: NURSE PRACTITIONER

## 2019-04-16 PROCEDURE — G8417 CALC BMI ABV UP PARAM F/U: HCPCS | Performed by: NURSE PRACTITIONER

## 2019-04-16 RX ORDER — CYCLOBENZAPRINE HYDROCHLORIDE 7.5 MG/1
7.5 TABLET, FILM COATED ORAL 3 TIMES DAILY PRN
Qty: 30 TABLET | Refills: 0 | Status: SHIPPED | OUTPATIENT
Start: 2019-04-16 | End: 2020-05-04

## 2019-04-16 NOTE — PROGRESS NOTES
[address] [place] [address] [address]  Dept: [phone]  Dept Fax: [phone]      Aileen Mendez is a 46 y.o. male who presents today for his medical conditions/complaints as noted below. Chief Complaint   Patient presents with    Arm Pain     numbness and tingling in both arms       HPI:     Arm Pain    The incident occurred more than 1 week ago (2 weeks ago). The injury mechanism was repetitive motion. The pain is present in the left shoulder and right shoulder. The quality of the pain is described as burning and aching (tingling). The pain radiates to the left hand and right hand. The pain is at a severity of 8/10. The pain is moderate. The pain has been worsening since the incident. Associated symptoms include muscle weakness, numbness (bilateral hand) and tingling. Pertinent negatives include no chest pain. Exacerbated by: repetitive use, worse at night after work. He has tried NSAIDs for the symptoms. The treatment provided no relief. Hx thoracic outlet synrome. Current Outpatient Medications   Medication Sig Dispense Refill    cyclobenzaprine (FEXMID) 7.5 MG tablet Take 1 tablet by mouth 3 times daily as needed for Muscle spasms 30 tablet 0    carvedilol (COREG) 3.125 MG tablet Take 1 tablet by mouth 2 times daily (with meals) 180 tablet 1    hydrochlorothiazide (HYDRODIURIL) 12.5 MG tablet Take 1 tablet by mouth daily 90 tablet 1    Blood Pressure KIT 1 Units by Does not apply route 2 times daily 1 kit 0    venlafaxine (EFFEXOR XR) 75 MG extended release capsule Take 3 capsules by mouth daily 270 capsule 1    aspirin 81 MG chewable tablet Take 1 tablet by mouth daily 30 tablet 5     No current facility-administered medications for this visit.       No Known Allergies    Past Medical History:   Diagnosis Date    Anxiety     Depression     including hospitalization    Extreme obesity     Headache     Hyperlipidemia     Hypertension     Kidney stones     Neuropathy      Past Surgical History:   Procedure Laterality Date    BACK SURGERY  10/31/2018    HERNIA REPAIR      LITHOTRIPSY       Social History     Socioeconomic History    Marital status:      Spouse name: Not on file    Number of children: Not on file    Years of education: Not on file    Highest education level: Not on file   Occupational History    Not on file   Social Needs    Financial resource strain: Not on file    Food insecurity:     Worry: Not on file     Inability: Not on file    Transportation needs:     Medical: Not on file     Non-medical: Not on file   Tobacco Use    Smoking status: Never Smoker    Smokeless tobacco: Current User     Types: Chew   Substance and Sexual Activity    Alcohol use: Yes     Alcohol/week: 0.0 oz     Comment: occasionally    Drug use: No    Sexual activity: Not on file   Lifestyle    Physical activity:     Days per week: Not on file     Minutes per session: Not on file    Stress: Not on file   Relationships    Social connections:     Talks on phone: Not on file     Gets together: Not on file     Attends Sabianist service: Not on file     Active member of club or organization: Not on file     Attends meetings of clubs or organizations: Not on file     Relationship status: Not on file    Intimate partner violence:     Fear of current or ex partner: Not on file     Emotionally abused: Not on file     Physically abused: Not on file     Forced sexual activity: Not on file   Other Topics Concern    Not on file   Social History Narrative    ** Merged History Encounter **          Family History   Problem Relation Age of Onset    High Blood Pressure Father     Alzheimer's Disease Maternal Grandmother        Subjective:      Review of Systems   Constitutional: Negative for chills, fatigue and fever. HENT: Negative. Respiratory: Negative for cough, shortness of breath and wheezing.     Cardiovascular: Negative for chest pain. Musculoskeletal: Positive for neck stiffness. Neurological: Positive for tingling and numbness (bilateral hand). Objective:     /88 (Site: Left Upper Arm, Position: Sitting)   Pulse 88   Ht 6' 4\" (1.93 m)   Wt (!) 444 lb (201.4 kg)   BMI 54.05 kg/m²     Physical Exam   Constitutional: He is oriented to person, place, and time. He appears well-developed and well-nourished. HENT:   Head: Normocephalic. Right Ear: Tympanic membrane normal.   Left Ear: Tympanic membrane normal.   Nose: Nose normal.   Mouth/Throat: Oropharynx is clear and moist.   Eyes: Conjunctivae are normal.   Neck: Normal range of motion. Neck supple. Muscular tenderness present. Normal range of motion present. No thyromegaly present. Cardiovascular: Normal rate, regular rhythm and normal heart sounds. Pulmonary/Chest: Effort normal and breath sounds normal. No respiratory distress. He has no wheezes. Musculoskeletal:        Right shoulder: Normal. He exhibits normal range of motion and no tenderness. Left shoulder: Normal. He exhibits normal range of motion and no tenderness. Cervical back: He exhibits tenderness and spasm. Back:    Lymphadenopathy:     He has no cervical adenopathy. Neurological: He is alert and oriented to person, place, and time. Assessment:      Diagnosis Orders   1. Spinal stenosis of cervical region  cyclobenzaprine (FEXMID) 7.5 MG tablet    External Referral To Physical Therapy   2. Cervical paraspinous muscle spasm  cyclobenzaprine (FEXMID) 7.5 MG tablet    External Referral To Physical Therapy   3. Screening for hyperlipidemia  Lipid Panel       Plan:     Return if symptoms worsen or fail to improve.     Orders Placed This Encounter   Procedures    Lipid Panel     Standing Status:   Future     Standing Expiration Date:   4/16/2020     Order Specific Question:   Is Patient Fasting?/# of Hours     Answer:   8    External Referral To Physical Therapy Referral Priority:   Routine     Referral Type:   Eval and Treat     Referral Reason:   Specialty Services Required     Requested Specialty:   Physical Therapy     Number of Visits Requested:   1     Orders Placed This Encounter   Medications    cyclobenzaprine (FEXMID) 7.5 MG tablet     Sig: Take 1 tablet by mouth 3 times daily as needed for Muscle spasms     Dispense:  30 tablet     Refill:  0      Discussed use, benefit, and side effects of prescribed medications. Monitor for worsening symptoms, call office with any concerns. All patient questions answered. Patient voiced understanding. Follow up as directed.      Electronically signed by GORAN Moise CNP on 4/28/2019 at 6:52 AM

## 2019-04-28 ASSESSMENT — ENCOUNTER SYMPTOMS
COUGH: 0
WHEEZING: 0
SHORTNESS OF BREATH: 0

## 2019-07-08 DIAGNOSIS — I10 ESSENTIAL HYPERTENSION: ICD-10-CM

## 2019-07-08 DIAGNOSIS — F33.1 MODERATE EPISODE OF RECURRENT MAJOR DEPRESSIVE DISORDER (HCC): ICD-10-CM

## 2019-07-08 RX ORDER — CARVEDILOL 3.12 MG/1
3.12 TABLET ORAL 2 TIMES DAILY WITH MEALS
Qty: 180 TABLET | Refills: 1 | Status: SHIPPED | OUTPATIENT
Start: 2019-07-08 | End: 2020-06-10 | Stop reason: SDUPTHER

## 2019-07-08 RX ORDER — VENLAFAXINE HYDROCHLORIDE 75 MG/1
225 CAPSULE, EXTENDED RELEASE ORAL DAILY
Qty: 270 CAPSULE | Refills: 1 | Status: SHIPPED | OUTPATIENT
Start: 2019-07-08 | End: 2020-06-10 | Stop reason: SDUPTHER

## 2019-07-08 NOTE — TELEPHONE ENCOUNTER
Glenna Rivera is calling to request a refill on the following medication(s):  Requested Prescriptions     Pending Prescriptions Disp Refills    carvedilol (COREG) 3.125 MG tablet 180 tablet 1     Sig: Take 1 tablet by mouth 2 times daily (with meals)    venlafaxine (EFFEXOR XR) 75 MG extended release capsule 270 capsule 1     Sig: Take 3 capsules by mouth daily       Last Visit Date (If Applicable):  6/02/7339    Next Visit Date:    Visit date not found

## 2019-07-09 DIAGNOSIS — I10 ESSENTIAL HYPERTENSION: ICD-10-CM

## 2019-07-09 RX ORDER — HYDROCHLOROTHIAZIDE 12.5 MG/1
12.5 TABLET ORAL DAILY
Qty: 90 TABLET | Refills: 1 | Status: SHIPPED | OUTPATIENT
Start: 2019-07-09 | End: 2020-06-10 | Stop reason: SDUPTHER

## 2020-03-25 PROBLEM — G54.0 THORACIC OUTLET SYNDROME: Status: RESOLVED | Noted: 2020-03-25 | Resolved: 2020-03-24

## 2020-05-04 ENCOUNTER — APPOINTMENT (OUTPATIENT)
Dept: GENERAL RADIOLOGY | Age: 40
End: 2020-05-04
Payer: COMMERCIAL

## 2020-05-04 ENCOUNTER — HOSPITAL ENCOUNTER (EMERGENCY)
Age: 40
Discharge: HOME | End: 2020-05-04
Attending: EMERGENCY MEDICINE
Payer: COMMERCIAL

## 2020-05-04 VITALS
SYSTOLIC BLOOD PRESSURE: 142 MMHG | RESPIRATION RATE: 16 BRPM | OXYGEN SATURATION: 97 % | BODY MASS INDEX: 38.36 KG/M2 | WEIGHT: 315 LBS | DIASTOLIC BLOOD PRESSURE: 85 MMHG | TEMPERATURE: 98.5 F | HEART RATE: 98 BPM | HEIGHT: 76 IN

## 2020-05-04 PROCEDURE — 73090 X-RAY EXAM OF FOREARM: CPT

## 2020-05-04 PROCEDURE — 6370000000 HC RX 637 (ALT 250 FOR IP): Performed by: EMERGENCY MEDICINE

## 2020-05-04 PROCEDURE — 73110 X-RAY EXAM OF WRIST: CPT

## 2020-05-04 PROCEDURE — 73130 X-RAY EXAM OF HAND: CPT

## 2020-05-04 PROCEDURE — 99284 EMERGENCY DEPT VISIT MOD MDM: CPT

## 2020-05-04 RX ORDER — IBUPROFEN 800 MG/1
800 TABLET ORAL ONCE
Status: COMPLETED | OUTPATIENT
Start: 2020-05-04 | End: 2020-05-04

## 2020-05-04 RX ORDER — HYDROCODONE BITARTRATE AND ACETAMINOPHEN 5; 325 MG/1; MG/1
1 TABLET ORAL EVERY 6 HOURS PRN
Qty: 10 TABLET | Refills: 0 | Status: SHIPPED | OUTPATIENT
Start: 2020-05-04 | End: 2020-05-07

## 2020-05-04 RX ADMIN — IBUPROFEN 800 MG: 800 TABLET, FILM COATED ORAL at 16:09

## 2020-05-04 ASSESSMENT — PAIN SCALES - GENERAL
PAINLEVEL_OUTOF10: 9
PAINLEVEL_OUTOF10: 8

## 2020-05-04 NOTE — ED PROVIDER NOTES
[address]  [address] [facility] [address]  Phone: [phone]    eMERGENCY dEPARTMENT eNCOUnter           CHIEF COMPLAINT       Chief Complaint   Patient presents with    Hand Injury       Nurses Notes reviewed and I agree except as noted in the HPI. HISTORY OF PRESENT ILLNESS    Rubin Spatz is a 45 y.o. male who presented via private vehicle. Chief complaint: Left hand, wrist and forearm injury. He was working in his lawnmower in his garage when it tipped and fell on his left forearm. He is complaining of moderate sharp persistent pain of the left hand forearm and wrist.  His pain is worse with thumb movement. He denies finger tingling or numbness. He denies other injuries. REVIEW OF SYSTEMS     None except as mentioned above. PAST MEDICAL HISTORY    has a past medical history of Anxiety, Depression, Extreme obesity, Headache, Hyperlipidemia, Hypertension, Kidney stones, and Neuropathy. SURGICAL HISTORY      has a past surgical history that includes Lithotripsy; hernia repair; and back surgery (10/31/2018). CURRENT MEDICATIONS       Discharge Medication List as of 5/4/2020  4:40 PM      CONTINUE these medications which have NOT CHANGED    Details   hydrochlorothiazide (HYDRODIURIL) 12.5 MG tablet Take 1 tablet by mouth daily, Disp-90 tablet, R-1Normal      carvedilol (COREG) 3.125 MG tablet Take 1 tablet by mouth 2 times daily (with meals), Disp-180 tablet, R-1Normal      venlafaxine (EFFEXOR XR) 75 MG extended release capsule Take 3 capsules by mouth daily, Disp-270 capsule, R-1Normal      Blood Pressure KIT 2 TIMES DAILY Starting Thu 10/11/2018, Disp-1 kit, R-0, Print      aspirin 81 MG chewable tablet Take 1 tablet by mouth daily, Disp-30 tablet, R-5Normal             ALLERGIES     has No Known Allergies. FAMILY HISTORY     He indicated that his mother is alive. He indicated that his father is alive.  He indicated that the status of his maternal grandmother is unknown.   family history includes Alzheimer's Disease in his maternal grandmother; High Blood Pressure in his father. SOCIAL HISTORY      reports that he has never smoked. His smokeless tobacco use includes chew. He reports previous alcohol use. He reports that he does not use drugs. PHYSICAL EXAM     INITIAL VITALS:  height is 6' 4\" (1.93 m) and weight is 400 lb (181.4 kg) (abnormal). His temporal temperature is 98.5 °F (36.9 °C). His blood pressure is 142/85 (abnormal) and his pulse is 98. His respiration is 16 and oxygen saturation is 97%. Physical Exam   Constitutional: He appears well-developed and well-nourished. No distress. HENT:   Head: Atraumatic. Musculoskeletal:      Comments: Examination of the left hand showed mild swelling and tenderness over the thenar muscle. Also there is mild swelling and tenderness to the extensor aspect of distal forearm. He can move all fingers except the thumb due to pain. He has no sensory deficit. Neurological: He is alert. DIFFERENTIAL DIAGNOSIS:       DIAGNOSTIC RESULTS       RADIOLOGY:   Left hand, left wrist and left forearm x-rays were unremarkable. LABS:   Labs Reviewed - No data to display    EMERGENCY DEPARTMENT COURSE:   Vitals:    Vitals:    05/04/20 1529   BP: (!) 142/85   Pulse: 98   Resp: 16   Temp: 98.5 °F (36.9 °C)   TempSrc: Temporal   SpO2: 97%   Weight: (!) 400 lb (181.4 kg)   Height: 6' 4\" (1.93 m)   He received 800 mg of Motrin p.o. Reevaluation at 4:30 PM:  He looked comfortable, he reported improvement    FINAL IMPRESSION      1. Contusion of left hand, initial encounter    2. Contusion of left wrist, initial encounter    3. Contusion of left forearm, initial encounter          DISPOSITION/PLAN   He was discharged home in good condition, he was given discharge instructions and was advised to return if worse or new symptoms.     PATIENT REFERRED TO:  Kirsten Cabrera, GORAN - CNP  [address]  [place]

## 2020-05-04 NOTE — ED NOTES
AVS rev'd with pt. And copy given with Norco Rx. Pt. Denies any relief of pain after motrin. Pulse regular. Extremities warm. Respirations regular and quiet. Mucous membranes pink & moist. Alert and oriented times 3. No nausea or vomiting. Range of motion within patient's limits. Skin pink, warm and dry. Calm and cooperative.      Kee Gutierrez RN  05/04/20 4548

## 2020-06-10 ENCOUNTER — OFFICE VISIT (OUTPATIENT)
Dept: FAMILY MEDICINE CLINIC | Age: 40
End: 2020-06-10
Payer: COMMERCIAL

## 2020-06-10 VITALS
DIASTOLIC BLOOD PRESSURE: 88 MMHG | HEIGHT: 76 IN | WEIGHT: 315 LBS | BODY MASS INDEX: 38.36 KG/M2 | OXYGEN SATURATION: 96 % | SYSTOLIC BLOOD PRESSURE: 138 MMHG | HEART RATE: 85 BPM

## 2020-06-10 PROCEDURE — 4004F PT TOBACCO SCREEN RCVD TLK: CPT | Performed by: NURSE PRACTITIONER

## 2020-06-10 PROCEDURE — G8417 CALC BMI ABV UP PARAM F/U: HCPCS | Performed by: NURSE PRACTITIONER

## 2020-06-10 PROCEDURE — G8427 DOCREV CUR MEDS BY ELIG CLIN: HCPCS | Performed by: NURSE PRACTITIONER

## 2020-06-10 PROCEDURE — 99214 OFFICE O/P EST MOD 30 MIN: CPT | Performed by: NURSE PRACTITIONER

## 2020-06-10 RX ORDER — CARVEDILOL 3.12 MG/1
3.12 TABLET ORAL 2 TIMES DAILY WITH MEALS
Qty: 180 TABLET | Refills: 1 | Status: SHIPPED | OUTPATIENT
Start: 2020-06-10 | End: 2020-12-30 | Stop reason: SDUPTHER

## 2020-06-10 RX ORDER — VENLAFAXINE HYDROCHLORIDE 75 MG/1
225 CAPSULE, EXTENDED RELEASE ORAL DAILY
Qty: 270 CAPSULE | Refills: 1 | Status: SHIPPED | OUTPATIENT
Start: 2020-06-10 | End: 2020-12-30 | Stop reason: SDUPTHER

## 2020-06-10 RX ORDER — METHYLPREDNISOLONE 4 MG/1
TABLET ORAL
Qty: 1 KIT | Refills: 0 | Status: SHIPPED | OUTPATIENT
Start: 2020-06-10 | End: 2020-06-16

## 2020-06-10 RX ORDER — HYDROCHLOROTHIAZIDE 12.5 MG/1
12.5 TABLET ORAL DAILY
Qty: 90 TABLET | Refills: 1 | Status: SHIPPED | OUTPATIENT
Start: 2020-06-10 | End: 2020-12-30 | Stop reason: SDUPTHER

## 2020-06-10 ASSESSMENT — PATIENT HEALTH QUESTIONNAIRE - PHQ9
SUM OF ALL RESPONSES TO PHQ QUESTIONS 1-9: 1
SUM OF ALL RESPONSES TO PHQ9 QUESTIONS 1 & 2: 1
1. LITTLE INTEREST OR PLEASURE IN DOING THINGS: 0
2. FEELING DOWN, DEPRESSED OR HOPELESS: 1
SUM OF ALL RESPONSES TO PHQ QUESTIONS 1-9: 1

## 2020-06-10 NOTE — PROGRESS NOTES
[address] [facility]  [address] [facility], [address] [facility]  Dept: [phone]  Dept Fax: [phone]    Anibal Garces is a 45 y.o. male who presents today for his medical conditions/complaints as noted below. Anibal Garces c/o of Numbness (both arms-worse at night-problems gripping ) and Other (patient having burning in both arms)    HPI:   Patient presents to the office complaining of numbness and burning to both upper extremities. He is also noticed difficulty with gripping things. This is a chronic problem. Symptoms started years ago. The symptoms seem to be worse during the evening hours. He has not taken anything for his symptoms. He has tried physical therapy in the past to help improve his strength, but states he did not get any better. Hypertension   This is a chronic problem. The current episode started more than 1 year ago. The problem is controlled. Associated symptoms include headaches (Occasional) and neck pain. Pertinent negatives include no anxiety, chest pain, malaise/fatigue, orthopnea, palpitations, peripheral edema or shortness of breath. There are no associated agents to hypertension. Risk factors for coronary artery disease include male gender, obesity, dyslipidemia, smoking/tobacco exposure and sedentary lifestyle. Past treatments include diuretics, beta blockers and alpha 1 blockers. The current treatment provides moderate improvement. Compliance problems include exercise and diet. Hyperlipidemia   This is a chronic problem. The current episode started more than 1 year ago. Recent lipid tests were reviewed and are variable. Exacerbating diseases include obesity. He has no history of diabetes or hypothyroidism. Factors aggravating his hyperlipidemia include thiazides and beta blockers. Pertinent negatives include no chest pain, leg pain, myalgias or shortness of breath.  Current antihyperlipidemic treatment includes diet change and exercise. Compliance problems include adherence to diet and adherence to exercise (Patient has not completed previously ordered labs). Risk factors for coronary artery disease include dyslipidemia, family history, obesity, male sex, hypertension and a sedentary lifestyle. Neck Pain    This is a chronic problem. The current episode started more than 1 year ago. The problem occurs daily. The problem has been waxing and waning. The pain is present in the midline. The quality of the pain is described as aching. The pain is moderate. The symptoms are aggravated by position. Worse during: Fluctuating. Associated symptoms include headaches (Occasional), numbness, tingling and weakness (Bilateral upper extremities). Pertinent negatives include no chest pain, fever, leg pain or photophobia. He has tried home exercises (Physical therapy) for the symptoms. The treatment provided mild relief. Mental Health Problem   The primary symptoms include dysphoric mood. he has the following symptoms: irritable. Onset of symptoms was approximately several years ago. Symptoms have been stable since that time. he denies current suicidal and homicidal ideation. Previous treatment includes Effexor. he complains of the following medication side effects: none. Appetite: normal  Sleep disturbance: No  Fatigue: No  Loss of pleasure: No  Impulsive behavior: No  Suicide Assessment: no suicidal ideation    The ASCVD Risk score (Carly Lock., et al., 2013) failed to calculate for the following reasons:     The 2013 ASCVD risk score is only valid for ages 36 to 78      BP Readings from Last 3 Encounters:   06/10/20 138/88   05/04/20 (!) 142/85   04/16/19 138/88              ([id number])    Wt Readings from Last 3 Encounters:   06/10/20 (!) 440 lb (199.6 kg)   05/04/20 (!) 400 lb (181.4 kg)   04/16/19 (!) 444 lb (201.4 kg)       Past Medical History:   Diagnosis Date    Anxiety     Depression     including hospitalization    Extreme obesity  Headache     Hyperlipidemia     Hypertension     Kidney stones     Neuropathy       Past Surgical History:   Procedure Laterality Date    BACK SURGERY  10/31/2018    HERNIA REPAIR      LITHOTRIPSY         Family History   Problem Relation Age of Onset    High Blood Pressure Father     Alzheimer's Disease Maternal Grandmother        Social History     Tobacco Use    Smoking status: Never Smoker    Smokeless tobacco: Current User     Types: Chew   Substance Use Topics    Alcohol use: Not Currently     Alcohol/week: 0.0 standard drinks     Comment: occasionally      Current Outpatient Medications   Medication Sig Dispense Refill    hydrochlorothiazide (HYDRODIURIL) 12.5 MG tablet Take 1 tablet by mouth daily 90 tablet 1    carvedilol (COREG) 3.125 MG tablet Take 1 tablet by mouth 2 times daily (with meals) 180 tablet 1    venlafaxine (EFFEXOR XR) 75 MG extended release capsule Take 3 capsules by mouth daily 270 capsule 1    aspirin 81 MG chewable tablet Take 1 tablet by mouth daily 30 tablet 5    Blood Pressure KIT 1 Units by Does not apply route 2 times daily 1 kit 0     No current facility-administered medications for this visit. No Known Allergies    Health Maintenance   Topic Date Due    Varicella vaccine (1 of 2 - 2-dose childhood series) 02/19/1982    HIV screen  02/19/1996    Potassium monitoring  10/29/2019    Creatinine monitoring  10/29/2019    Flu vaccine (Season Ended) 09/01/2020    DTaP/Tdap/Td vaccine (2 - Td) 05/15/2024    Hepatitis A vaccine  Aged Out    Hepatitis B vaccine  Aged Out    Hib vaccine  Aged Out    Meningococcal (ACWY) vaccine  Aged Out    Pneumococcal 0-64 years Vaccine  Aged Out       Subjective:      Review of Systems   Constitutional: Negative for chills, diaphoresis, fatigue, fever and malaise/fatigue. HENT: Negative. Eyes: Negative. Negative for photophobia. Respiratory: Negative for cough, shortness of breath and wheezing. Musculoskeletal:      Right lower leg: No edema. Left lower leg: No edema. Lymphadenopathy:      Cervical: No cervical adenopathy. Skin:     Capillary Refill: Capillary refill takes less than 2 seconds. Neurological:      Mental Status: He is alert and oriented to person, place, and time. Psychiatric:         Mood and Affect: Mood normal.         Behavior: Behavior is cooperative. PHQ Scores 6/10/2020 10/11/2018 11/27/2017   PHQ2 Score 1 0 6   PHQ9 Score 1 0 18     Interpretation of Total Score Depression Severity: 1-4 = Minimal depression, 5-9 = Mild depression, 10-14 = Moderate depression, 15-19 = Moderately severe depression, 20-27 = Severe depression     Lab Results   Component Value Date     10/29/2018    K 3.6 10/29/2018     10/29/2018    CO2 28 10/29/2018    BUN 15 10/29/2018    CREATININE 0.7 10/29/2018    GLUCOSE 93 10/29/2018    CALCIUM 8.9 10/29/2018    PROT 7.1 10/29/2018    LABALBU 4.1 10/29/2018    BILITOT 0.8 10/29/2018    ALKPHOS 82 10/29/2018    AST 25 10/29/2018    ALT 49 10/29/2018    LABGLOM >60 08/24/2017    GFRAA >60 08/24/2017    AGRATIO 1.4 10/29/2018    GLOB 3.0 10/29/2018     Lab Results   Component Value Date    LABA1C 4.9 08/29/2017    LABA1C 5.0 08/22/2017       Lab Results   Component Value Date    CREATININE 0.7 10/29/2018       Assessment:     1. Essential hypertension    2. Moderate episode of recurrent major depressive disorder ([facility] [address]    3. Morbid obesity with BMI of 45.0-49.9, adult ([facility] [address]    4. Anxiety, generalized    5. Mixed hyperlipidemia    6.  Spinal stenosis of cervical region        Plan:     Orders Placed This Encounter   Procedures    Lipid, Fasting     Standing Status:   Future     Standing Expiration Date:   6/9/2021   Hay Bachelor, Ur     Standing Status:   Future     Standing Expiration Date:   6/9/2021    Comprehensive Metabolic Panel, Fasting     Standing Status:   Future     Standing Expiration Date:   6/9/2021    TSH without

## 2020-06-20 PROBLEM — F17.220 NICOTINE DEPENDENCE, CHEWING TOBACCO, UNCOMPLICATED: Status: ACTIVE | Noted: 2018-10-30

## 2020-06-20 ASSESSMENT — ENCOUNTER SYMPTOMS
WHEEZING: 0
ABDOMINAL PAIN: 0
SHORTNESS OF BREATH: 0
CONSTIPATION: 0
ORTHOPNEA: 0
PHOTOPHOBIA: 0
EYES NEGATIVE: 1
DIARRHEA: 0
COUGH: 0

## 2020-10-23 ENCOUNTER — TELEPHONE (OUTPATIENT)
Dept: FAMILY MEDICINE CLINIC | Age: 40
End: 2020-10-23

## 2020-10-23 NOTE — LETTER
[address]  [address]  Dept: [phone]  Dept Fax: [phone]: [phone]     October 23, 2020    Kevon Scott  [address] [facility]    Dear Bob Mcmahon,    This letter is regarding your visit with [facility] on 10/22/2020. Vitaliy Taylor wanted to make sure that you understand your discharge instructions and that you were able to fill any prescriptions that may have been ordered for you. Please contact the office at the above phone number if advised you to follow up with Vitaliy Taylor, or if you have any further questions or needs. Also did you know -                             [facility] ([facility]) practices can often offer you an appointment on the same day that you call for acute issues. *We have some [facility] offices that offer Walk-in appointments; check our website for availability in your community, [email or web address].      *Evisits are now available for patients through [address] If you do not have MyChart and are interested, please contact the office and a staff member may assist you or go to [email or web address].     Sincerely,   GORAN Shah CNP and your [facility]

## 2020-10-28 ENCOUNTER — TELEPHONE (OUTPATIENT)
Dept: FAMILY MEDICINE CLINIC | Age: 40
End: 2020-10-28

## 2020-12-30 ENCOUNTER — OFFICE VISIT (OUTPATIENT)
Dept: FAMILY MEDICINE CLINIC | Age: 40
End: 2020-12-30
Payer: COMMERCIAL

## 2020-12-30 VITALS
DIASTOLIC BLOOD PRESSURE: 76 MMHG | SYSTOLIC BLOOD PRESSURE: 130 MMHG | WEIGHT: 315 LBS | HEART RATE: 96 BPM | OXYGEN SATURATION: 96 % | BODY MASS INDEX: 55.99 KG/M2

## 2020-12-30 DIAGNOSIS — R53.83 FATIGUE, UNSPECIFIED TYPE: ICD-10-CM

## 2020-12-30 DIAGNOSIS — I10 ESSENTIAL HYPERTENSION: ICD-10-CM

## 2020-12-30 DIAGNOSIS — Z13.1 SCREENING FOR DIABETES MELLITUS: ICD-10-CM

## 2020-12-30 DIAGNOSIS — M25.50 MULTIPLE JOINT PAIN: Primary | ICD-10-CM

## 2020-12-30 DIAGNOSIS — E66.01 CLASS 3 SEVERE OBESITY WITH SERIOUS COMORBIDITY AND BODY MASS INDEX (BMI) OF 50.0 TO 59.9 IN ADULT, UNSPECIFIED OBESITY TYPE (HCC): ICD-10-CM

## 2020-12-30 DIAGNOSIS — F33.1 MODERATE EPISODE OF RECURRENT MAJOR DEPRESSIVE DISORDER (HCC): ICD-10-CM

## 2020-12-30 DIAGNOSIS — R63.5 WEIGHT GAIN: ICD-10-CM

## 2020-12-30 PROCEDURE — G8484 FLU IMMUNIZE NO ADMIN: HCPCS | Performed by: NURSE PRACTITIONER

## 2020-12-30 PROCEDURE — G8417 CALC BMI ABV UP PARAM F/U: HCPCS | Performed by: NURSE PRACTITIONER

## 2020-12-30 PROCEDURE — G8427 DOCREV CUR MEDS BY ELIG CLIN: HCPCS | Performed by: NURSE PRACTITIONER

## 2020-12-30 PROCEDURE — 99214 OFFICE O/P EST MOD 30 MIN: CPT | Performed by: NURSE PRACTITIONER

## 2020-12-30 PROCEDURE — 4004F PT TOBACCO SCREEN RCVD TLK: CPT | Performed by: NURSE PRACTITIONER

## 2020-12-30 RX ORDER — CARVEDILOL 3.12 MG/1
3.12 TABLET ORAL 2 TIMES DAILY WITH MEALS
Qty: 180 TABLET | Refills: 1 | Status: SHIPPED | OUTPATIENT
Start: 2020-12-30 | End: 2021-06-22

## 2020-12-30 RX ORDER — VENLAFAXINE HYDROCHLORIDE 75 MG/1
75 CAPSULE, EXTENDED RELEASE ORAL DAILY
Qty: 90 CAPSULE | Refills: 1 | Status: SHIPPED | OUTPATIENT
Start: 2020-12-30 | End: 2021-03-31 | Stop reason: SDUPTHER

## 2020-12-30 RX ORDER — HYDROCHLOROTHIAZIDE 12.5 MG/1
12.5 TABLET ORAL DAILY
Qty: 90 TABLET | Refills: 1 | Status: SHIPPED | OUTPATIENT
Start: 2020-12-30 | End: 2021-06-22

## 2020-12-30 NOTE — PROGRESS NOTES
Wt Readings from Last 3 Encounters:   01/07/21 (!) 461 lb 1.6 oz (209.2 kg)   12/30/20 (!) 460 lb (208.7 kg)   06/10/20 (!) 440 lb (199.6 kg)       Past Medical History:   Diagnosis Date    Anxiety     Depression     including hospitalization    Extreme obesity     Headache     Hyperlipidemia     Hypertension     Kidney stones     Neuropathy       Past Surgical History:   Procedure Laterality Date    BACK SURGERY  10/31/2018    HERNIA REPAIR      LITHOTRIPSY         Family History   Problem Relation Age of Onset    High Blood Pressure Father     Alzheimer's Disease Maternal Grandmother        Social History     Tobacco Use    Smoking status: Never Smoker    Smokeless tobacco: Current User     Types: Chew   Substance Use Topics    Alcohol use: Not Currently     Alcohol/week: 0.0 standard drinks     Comment: occasionally      Current Outpatient Medications   Medication Sig Dispense Refill    hydroCHLOROthiazide (HYDRODIURIL) 12.5 MG tablet Take 1 tablet by mouth daily 90 tablet 1    carvedilol (COREG) 3.125 MG tablet Take 1 tablet by mouth 2 times daily (with meals) 180 tablet 1    venlafaxine (EFFEXOR XR) 75 MG extended release capsule Take 1 capsule by mouth daily 90 capsule 1    vitamin D (ERGOCALCIFEROL) 1.25 MG (37688 UT) CAPS capsule Take 1 capsule by mouth once a week 4 capsule 2    Blood Pressure KIT 1 Units by Does not apply route 2 times daily 1 kit 0    aspirin 81 MG chewable tablet Take 1 tablet by mouth daily 30 tablet 5     No current facility-administered medications for this visit.       No Known Allergies    Health Maintenance   Topic Date Due    Hepatitis C screen  1980    Flu vaccine (1) 09/01/2020    Varicella vaccine (1 of 2 - 2-dose childhood series) 01/20/2021 (Originally 2/19/1982)    Potassium monitoring  10/22/2021    Creatinine monitoring  10/22/2021    DTaP/Tdap/Td vaccine (2 - Td) 05/15/2024    Hepatitis A vaccine  Aged Out  Hepatitis B vaccine  Aged Out    Hib vaccine  Aged Out    Meningococcal (ACWY) vaccine  Aged Out    Pneumococcal 0-64 years Vaccine  Aged Out    HIV screen  Discontinued       Subjective:      Review of Systems   Constitutional: Positive for fatigue and unexpected weight change (gain). Negative for chills and fever. HENT: Negative. Eyes: Negative. Respiratory: Negative for cough, shortness of breath and wheezing. Cardiovascular: Negative for chest pain, palpitations and leg swelling. Gastrointestinal: Negative for abdominal pain, constipation and diarrhea. Endocrine: Negative for cold intolerance, heat intolerance, polydipsia, polyphagia and polyuria. Genitourinary: Negative. Musculoskeletal: Negative for arthralgias and myalgias. Skin: Negative. Allergic/Immunologic: Negative for environmental allergies and food allergies. Neurological: Negative for dizziness, weakness and headaches. Psychiatric/Behavioral: Positive for agitation and dysphoric mood. Negative for self-injury, sleep disturbance and suicidal ideas. The patient is nervous/anxious. Objective:     /76   Pulse 96   Wt (!) 460 lb (208.7 kg)   SpO2 96%   BMI 55.99 kg/m²     Physical Exam  Constitutional:       Appearance: Normal appearance. He is well-developed and well-groomed. He is obese. HENT:      Head: Normocephalic. Nose: Nose normal.      Mouth/Throat:      Mouth: Mucous membranes are moist.   Eyes:      Conjunctiva/sclera: Conjunctivae normal.   Neck:      Musculoskeletal: Neck supple. Thyroid: No thyromegaly. Cardiovascular:      Rate and Rhythm: Normal rate and regular rhythm. Heart sounds: Normal heart sounds. Pulmonary:      Effort: Pulmonary effort is normal.      Breath sounds: Normal breath sounds. No wheezing. Abdominal:      General: Bowel sounds are normal.      Palpations: Abdomen is soft. Tenderness: There is no abdominal tenderness.    Musculoskeletal: Right lower leg: No edema. Left lower leg: No edema. Lymphadenopathy:      Cervical: No cervical adenopathy. Skin:     Capillary Refill: Capillary refill takes less than 2 seconds. Neurological:      Mental Status: He is alert and oriented to person, place, and time. Psychiatric:         Mood and Affect: Mood is depressed. Behavior: Behavior is cooperative. PHQ Scores 6/10/2020 10/11/2018 11/27/2017   PHQ2 Score 1 0 6   PHQ9 Score 1 0 18     Interpretation of Total Score Depression Severity: 1-4 = Minimal depression, 5-9 = Mild depression, 10-14 = Moderate depression, 15-19 = Moderately severe depression, 20-27 = Severe depression     Lab Results   Component Value Date     10/22/2020    K 3.4 (L) 10/22/2020     10/22/2020    CO2 28 10/22/2020    BUN 14 10/22/2020    CREATININE 0.7 10/22/2020    GLUCOSE 119 (H) 10/22/2020    CALCIUM 9.0 10/22/2020    PROT 7.1 10/29/2018    LABALBU 3.9 10/22/2020    BILITOT 0.5 10/22/2020    ALKPHOS 89 10/22/2020    AST 23 10/22/2020    ALT 34 10/22/2020    LABGLOM > 60.0 10/22/2020    GFRAA >60 08/24/2017    AGRATIO 1.4 10/29/2018    GLOB 2.8 10/22/2020     Lab Results   Component Value Date    LABA1C 5.0 12/31/2020    LABA1C 4.9 08/29/2017    LABA1C 5.0 08/22/2017       Lab Results   Component Value Date    CREATININE 0.7 10/22/2020       Assessment:     1. Multiple joint pain    2. Weight gain    3. Fatigue, unspecified type    4. Essential hypertension    5. Moderate episode of recurrent major depressive disorder (HCC)    6. Class 3 severe obesity with serious comorbidity and body mass index (BMI) of 50.0 to 59.9 in adult, unspecified obesity type ([address]    7.  Screening for diabetes mellitus          Plan:     Orders Placed This Encounter   Procedures    HILTON Screen with Reflex     Standing Status:   Future     Number of Occurrences:   1     Standing Expiration Date:   12/30/2021    CRP,High Sensitivity     Standing Status:   Future Number of Occurrences:   1     Standing Expiration Date:   91/68/2299    Cyclic Citrul Peptide Antibody, IgG     Standing Status:   Future     Number of Occurrences:   1     Standing Expiration Date:   12/30/2021    Rheumatoid Factor     Standing Status:   Future     Number of Occurrences:   1     Standing Expiration Date:   12/30/2021    Sedimentation Rate    Hemoglobin A1C    Uric Acid    T4, Free    Vitamin D 25 Hydroxy    HLA ANTIGEN B27    Rheumatoid Factor    HILTON Screen    CRP,High Sensitivity    Cyclic Citrul Peptide Antibody, IgG       Orders Placed This Encounter   Medications    hydroCHLOROthiazide (HYDRODIURIL) 12.5 MG tablet     Sig: Take 1 tablet by mouth daily     Dispense:  90 tablet     Refill:  1    carvedilol (COREG) 3.125 MG tablet     Sig: Take 1 tablet by mouth 2 times daily (with meals)     Dispense:  180 tablet     Refill:  1    venlafaxine (EFFEXOR XR) 75 MG extended release capsule     Sig: Take 1 capsule by mouth daily     Dispense:  90 capsule     Refill:  1      Instructed to restart home medication. Discouraged against discontinuing medication without discussing with prescriber. Instructed to walk 30 minutes, 5 days weekly, increase fluids to 64 ounces daily, decrease greasy/fried foods in diet and increase vegetables. Also offered to refer to dietitian. Patient would like to wait on referral at this time. Discussed use, benefit, and side effects of prescribed medications. All patient questions answered. Patient voiced understanding. Health Maintenance reviewed. Instructed to continue current medications, diet and exercise. Patient agreed with treatment plan. Follow up as directed. Return if symptoms worsen or fail to improve.     Electronically signed by GORAN Lazo CNP on 1/10/2021

## 2020-12-31 LAB
HBA1C MFR BLD: 5 % (ref 4.4–6.4)
SEDIMENTATION RATE, ERYTHROCYTE: 14 MM/HR (ref 0–20)
T4 FREE: 0.94 NG/DL (ref 0.78–2.19)
URIC ACID: 6.6 MG/DL (ref 3.5–8.5)
VITAMIN D 25-HYDROXY: 26.6 NG/ML (ref 30–100)

## 2021-01-03 LAB — HLA B27: NEGATIVE

## 2021-01-04 DIAGNOSIS — M25.50 MULTIPLE JOINT PAIN: ICD-10-CM

## 2021-01-04 LAB
ANA SCREEN: NEGATIVE
C-REACTIVE PROTEIN, HIGH SENSITIVITY: 2.8 MG/L
CYCLIC CITRULLIN PEPTIDE AB: <1.5 U/ML
RHEUMATOID FACTOR: 10.4 IU/ML

## 2021-01-07 ENCOUNTER — OFFICE VISIT (OUTPATIENT)
Dept: FAMILY MEDICINE CLINIC | Age: 41
End: 2021-01-07
Payer: COMMERCIAL

## 2021-01-07 VITALS
SYSTOLIC BLOOD PRESSURE: 136 MMHG | DIASTOLIC BLOOD PRESSURE: 74 MMHG | HEART RATE: 79 BPM | BODY MASS INDEX: 56.13 KG/M2 | OXYGEN SATURATION: 98 % | WEIGHT: 315 LBS

## 2021-01-07 DIAGNOSIS — E66.01 MORBID OBESITY WITH BMI OF 45.0-49.9, ADULT (HCC): ICD-10-CM

## 2021-01-07 DIAGNOSIS — M25.50 MULTIPLE JOINT PAIN: Primary | ICD-10-CM

## 2021-01-07 DIAGNOSIS — F41.1 ANXIETY, GENERALIZED: ICD-10-CM

## 2021-01-07 DIAGNOSIS — I10 ESSENTIAL HYPERTENSION: ICD-10-CM

## 2021-01-07 DIAGNOSIS — F33.1 MODERATE EPISODE OF RECURRENT MAJOR DEPRESSIVE DISORDER (HCC): ICD-10-CM

## 2021-01-07 DIAGNOSIS — R63.5 WEIGHT GAIN: ICD-10-CM

## 2021-01-07 DIAGNOSIS — E55.9 VITAMIN D DEFICIENCY: ICD-10-CM

## 2021-01-07 DIAGNOSIS — R53.83 FATIGUE, UNSPECIFIED TYPE: ICD-10-CM

## 2021-01-07 DIAGNOSIS — E78.2 MIXED HYPERLIPIDEMIA: ICD-10-CM

## 2021-01-07 PROCEDURE — G8484 FLU IMMUNIZE NO ADMIN: HCPCS | Performed by: NURSE PRACTITIONER

## 2021-01-07 PROCEDURE — 4004F PT TOBACCO SCREEN RCVD TLK: CPT | Performed by: NURSE PRACTITIONER

## 2021-01-07 PROCEDURE — G8427 DOCREV CUR MEDS BY ELIG CLIN: HCPCS | Performed by: NURSE PRACTITIONER

## 2021-01-07 PROCEDURE — 99213 OFFICE O/P EST LOW 20 MIN: CPT | Performed by: NURSE PRACTITIONER

## 2021-01-07 PROCEDURE — G8417 CALC BMI ABV UP PARAM F/U: HCPCS | Performed by: NURSE PRACTITIONER

## 2021-01-07 RX ORDER — ERGOCALCIFEROL 1.25 MG/1
50000 CAPSULE ORAL WEEKLY
Qty: 4 CAPSULE | Refills: 2 | Status: SHIPPED | OUTPATIENT
Start: 2021-01-07 | End: 2021-03-31 | Stop reason: SDUPTHER

## 2021-01-07 NOTE — PROGRESS NOTES
[address] [facility], [address] [facility]  Dept: [phone]  Dept Fax: [phone]    Nidia Romero is a 45 y.o. male who presents today for his medical conditions/complaints as noted below. Nidia Romero c/o of Results (here to review results of labs due to recent weight gain)      HPI:   Patient presents the office to discuss recent lab results completed on 12/31/2020. He was evaluated 4 weeks ago for weight gain, feeling achy all over, and stiffness with bending and moving. He stopped taking his prescription medication for 4 to 6 weeks due to feeling frustrated with his symptoms. He denies any changes or reduction in activity level. He reports being active daily. He has recently cut down on drinking soda and eating sugary type foods. He reports gaining 20 pounds over 2 months. Review of previous weight indicates a 21 pound weight gain over the past 6 months.         BP Readings from Last 3 Encounters:   01/07/21 136/74   12/30/20 130/76   06/10/20 138/88              ([id number])    Pulse Readings from Last 3 Encounters:   01/07/21 79   12/30/20 96   06/10/20 85        Wt Readings from Last 3 Encounters:   01/07/21 (!) 461 lb 1.6 oz (209.2 kg)   12/30/20 (!) 460 lb (208.7 kg)   06/10/20 (!) 440 lb (199.6 kg)       Past Medical History:   Diagnosis Date    Anxiety     Depression     including hospitalization    Extreme obesity     Headache     Hyperlipidemia     Hypertension     Kidney stones     Neuropathy       Past Surgical History:   Procedure Laterality Date    BACK SURGERY  10/31/2018    HERNIA REPAIR      LITHOTRIPSY         Family History   Problem Relation Age of Onset    High Blood Pressure Father     Alzheimer's Disease Maternal Grandmother        Social History     Tobacco Use    Smoking status: Never Smoker    Smokeless tobacco: Current User     Types: Chew   Substance Use Topics    Alcohol use: Not Currently Alcohol/week: 0.0 standard drinks     Comment: occasionally      Current Outpatient Medications   Medication Sig Dispense Refill    vitamin D (ERGOCALCIFEROL) 1.25 MG (35795 UT) CAPS capsule Take 1 capsule by mouth once a week 4 capsule 2    hydroCHLOROthiazide (HYDRODIURIL) 12.5 MG tablet Take 1 tablet by mouth daily 90 tablet 1    carvedilol (COREG) 3.125 MG tablet Take 1 tablet by mouth 2 times daily (with meals) 180 tablet 1    venlafaxine (EFFEXOR XR) 75 MG extended release capsule Take 1 capsule by mouth daily 90 capsule 1    Blood Pressure KIT 1 Units by Does not apply route 2 times daily 1 kit 0    aspirin 81 MG chewable tablet Take 1 tablet by mouth daily 30 tablet 5     No current facility-administered medications for this visit. No Known Allergies    Health Maintenance   Topic Date Due    Hepatitis C screen  1980    Flu vaccine (1) 09/01/2020    Varicella vaccine (1 of 2 - 2-dose childhood series) 01/20/2021 (Originally 2/19/1982)    Potassium monitoring  10/22/2021    Creatinine monitoring  10/22/2021    DTaP/Tdap/Td vaccine (2 - Td) 05/15/2024    Hepatitis A vaccine  Aged Out    Hepatitis B vaccine  Aged Out    Hib vaccine  Aged Out    Meningococcal (ACWY) vaccine  Aged Out    Pneumococcal 0-64 years Vaccine  Aged Out    HIV screen  Discontinued       Subjective:      Review of Systems   Constitutional: Positive for fatigue and unexpected weight change (Weight gain). Negative for chills and fever. HENT: Negative. Eyes: Negative. Respiratory: Negative for cough, shortness of breath and wheezing. Cardiovascular: Negative for chest pain, palpitations and leg swelling. Gastrointestinal: Negative for abdominal pain, constipation and diarrhea. Endocrine: Negative for cold intolerance, heat intolerance, polydipsia, polyphagia and polyuria. Genitourinary: Negative. Musculoskeletal: Positive for arthralgias and myalgias. Generalized   Skin: Negative. Allergic/Immunologic: Negative for environmental allergies and food allergies. Neurological: Negative for dizziness, weakness and headaches. Psychiatric/Behavioral: Positive for agitation, decreased concentration and dysphoric mood. Negative for self-injury, sleep disturbance and suicidal ideas. The patient is not nervous/anxious. Objective:     /74   Pulse 79   Wt (!) 461 lb 1.6 oz (209.2 kg)   SpO2 98%   BMI 56.13 kg/m²     Physical Exam  Constitutional:       Appearance: Normal appearance. He is well-developed and well-groomed. He is obese. HENT:      Head: Normocephalic. Nose: Nose normal.      Mouth/Throat:      Mouth: Mucous membranes are moist.   Eyes:      Conjunctiva/sclera: Conjunctivae normal.      Pupils: Pupils are equal, round, and reactive to light. Neck:      Musculoskeletal: Neck supple. Thyroid: No thyromegaly. Cardiovascular:      Rate and Rhythm: Normal rate and regular rhythm. Heart sounds: Normal heart sounds. Pulmonary:      Effort: Pulmonary effort is normal.      Breath sounds: Normal breath sounds. No wheezing. Abdominal:      General: Bowel sounds are normal.      Palpations: Abdomen is soft. Tenderness: There is no abdominal tenderness. Musculoskeletal:      Right lower leg: No edema. Left lower leg: No edema. Lymphadenopathy:      Cervical: No cervical adenopathy. Skin:     Capillary Refill: Capillary refill takes less than 2 seconds. Neurological:      Mental Status: He is alert and oriented to person, place, and time. Psychiatric:         Mood and Affect: Mood is anxious and depressed. Behavior: Behavior is cooperative.          PHQ Scores 6/10/2020 10/11/2018 11/27/2017   PHQ2 Score 1 0 6   PHQ9 Score 1 0 18     Interpretation of Total Score Depression Severity: 1-4 = Minimal depression, 5-9 = Mild depression, 10-14 = Moderate depression, 15-19 = Moderately severe depression, 20-27 = Severe depression Lab Results   Component Value Date     10/22/2020    K 3.4 (L) 10/22/2020     10/22/2020    CO2 28 10/22/2020    BUN 14 10/22/2020    CREATININE 0.7 10/22/2020    GLUCOSE 119 (H) 10/22/2020    CALCIUM 9.0 10/22/2020    PROT 7.1 10/29/2018    LABALBU 3.9 10/22/2020    BILITOT 0.5 10/22/2020    ALKPHOS 89 10/22/2020    AST 23 10/22/2020    ALT 34 10/22/2020    LABGLOM > 60.0 10/22/2020    GFRAA >60 08/24/2017    AGRATIO 1.4 10/29/2018    GLOB 2.8 10/22/2020     Lab Results   Component Value Date    LABA1C 5.0 12/31/2020    LABA1C 4.9 08/29/2017    LABA1C 5.0 08/22/2017     Lab Results   Component Value Date    TSH 1.63 08/22/2017     Lab Results   Component Value Date    CREATININE 0.7 10/22/2020     Lab Results   Component Value Date    SEDRATE 14 12/31/2020     Lab Results   Component Value Date    URICACID 6.6 12/31/2020           Assessment:     1. Multiple joint pain    2. Weight gain    3. Fatigue, unspecified type    4. Vitamin D deficiency    5. Essential hypertension    6. Moderate episode of recurrent major depressive disorder ([facility] [address]    7. Morbid obesity with BMI of 45.0-49.9, adult ([facility] [address]    8. Mixed hyperlipidemia    9.  Anxiety, generalized        Plan:       Orders Placed This Encounter   Medications    vitamin D (ERGOCALCIFEROL) 1.25 MG (08462 UT) CAPS capsule     Sig: Take 1 capsule by mouth once a week     Dispense:  4 capsule     Refill:  2 Discussed recent lab results with patient. Vitamin D level is low, supplement sent to pharmacy. Offered referral to dietitian, but patient declined at this time. Instructed to increase activity level, decrease greasy/fried foods in diet and increase vegetables. Discussed use, benefit, and side effects of prescribed medications. All patient questions answered. Monitor for worsening symptoms, and follow-up with any questions or concerns. Patient voiced understanding. Health Maintenance reviewed. Instructed to continue current medications, diet and exercise. Patient agreed with treatment plan. Follow up as directed. Return if symptoms worsen or fail to improve.     Electronically signed by GORAN Lindsey CNP on 1/16/2021

## 2021-01-10 ASSESSMENT — ENCOUNTER SYMPTOMS
EYES NEGATIVE: 1
SHORTNESS OF BREATH: 0
COUGH: 0
DIARRHEA: 0
WHEEZING: 0
CONSTIPATION: 0
ABDOMINAL PAIN: 0

## 2021-01-16 PROBLEM — E55.9 VITAMIN D DEFICIENCY: Status: ACTIVE | Noted: 2021-01-16

## 2021-01-16 PROBLEM — R53.83 FATIGUE: Status: ACTIVE | Noted: 2021-01-16

## 2021-01-16 PROBLEM — R63.5 WEIGHT GAIN: Status: ACTIVE | Noted: 2021-01-16

## 2021-01-16 PROBLEM — M25.50 MULTIPLE JOINT PAIN: Status: ACTIVE | Noted: 2021-01-16

## 2021-01-16 PROBLEM — F33.1 MODERATE EPISODE OF RECURRENT MAJOR DEPRESSIVE DISORDER (HCC): Status: ACTIVE | Noted: 2021-01-16

## 2021-01-16 ASSESSMENT — ENCOUNTER SYMPTOMS
WHEEZING: 0
DIARRHEA: 0
ABDOMINAL PAIN: 0
COUGH: 0
CONSTIPATION: 0
EYES NEGATIVE: 1
SHORTNESS OF BREATH: 0

## 2021-03-31 DIAGNOSIS — E55.9 VITAMIN D DEFICIENCY: ICD-10-CM

## 2021-03-31 DIAGNOSIS — F33.1 MODERATE EPISODE OF RECURRENT MAJOR DEPRESSIVE DISORDER (HCC): ICD-10-CM

## 2021-03-31 RX ORDER — ERGOCALCIFEROL 1.25 MG/1
50000 CAPSULE ORAL WEEKLY
Qty: 4 CAPSULE | Refills: 2 | Status: SHIPPED | OUTPATIENT
Start: 2021-03-31

## 2021-03-31 RX ORDER — VENLAFAXINE HYDROCHLORIDE 75 MG/1
75 CAPSULE, EXTENDED RELEASE ORAL DAILY
Qty: 90 CAPSULE | Refills: 1 | Status: SHIPPED | OUTPATIENT
Start: 2021-03-31 | End: 2021-07-26 | Stop reason: SDUPTHER

## 2021-03-31 NOTE — TELEPHONE ENCOUNTER
Lenin Nelson is calling to request a refill on the following medication(s):  Requested Prescriptions     Pending Prescriptions Disp Refills    venlafaxine (EFFEXOR XR) 75 MG extended release capsule 90 capsule 1     Sig: Take 1 capsule by mouth daily    vitamin D (ERGOCALCIFEROL) 1.25 MG (74000 UT) CAPS capsule 4 capsule 2     Sig: Take 1 capsule by mouth once a week       Last Visit Date (If Applicable):  2/3/5196    Next Visit Date:    Visit date not found

## 2021-04-28 ENCOUNTER — OFFICE VISIT (OUTPATIENT)
Dept: FAMILY MEDICINE CLINIC | Age: 41
End: 2021-04-28
Payer: COMMERCIAL

## 2021-04-28 VITALS
DIASTOLIC BLOOD PRESSURE: 60 MMHG | WEIGHT: 315 LBS | BODY MASS INDEX: 54.9 KG/M2 | SYSTOLIC BLOOD PRESSURE: 130 MMHG | OXYGEN SATURATION: 99 % | HEART RATE: 93 BPM

## 2021-04-28 DIAGNOSIS — M54.42 ACUTE BILATERAL LOW BACK PAIN WITH LEFT-SIDED SCIATICA: Primary | ICD-10-CM

## 2021-04-28 DIAGNOSIS — R29.898 WEAKNESS OF LEFT LEG: ICD-10-CM

## 2021-04-28 DIAGNOSIS — N39.498 OTHER URINARY INCONTINENCE: ICD-10-CM

## 2021-04-28 PROCEDURE — 99213 OFFICE O/P EST LOW 20 MIN: CPT | Performed by: NURSE PRACTITIONER

## 2021-04-28 PROCEDURE — G8417 CALC BMI ABV UP PARAM F/U: HCPCS | Performed by: NURSE PRACTITIONER

## 2021-04-28 PROCEDURE — G8427 DOCREV CUR MEDS BY ELIG CLIN: HCPCS | Performed by: NURSE PRACTITIONER

## 2021-04-28 PROCEDURE — 4004F PT TOBACCO SCREEN RCVD TLK: CPT | Performed by: NURSE PRACTITIONER

## 2021-04-28 ASSESSMENT — ENCOUNTER SYMPTOMS
SHORTNESS OF BREATH: 0
BACK PAIN: 1
ABDOMINAL PAIN: 0
CONSTIPATION: 0
WHEEZING: 0
DIARRHEA: 0
NAUSEA: 0

## 2021-04-28 NOTE — PROGRESS NOTES
10/31/2018    HERNIA REPAIR      LITHOTRIPSY         Family History   Problem Relation Age of Onset    High Blood Pressure Father     Alzheimer's Disease Maternal Grandmother        Social History     Tobacco Use    Smoking status: Never Smoker    Smokeless tobacco: Current User     Types: Chew   Substance Use Topics    Alcohol use: Not Currently     Alcohol/week: 0.0 standard drinks     Comment: occasionally      Current Outpatient Medications   Medication Sig Dispense Refill    venlafaxine (EFFEXOR XR) 75 MG extended release capsule Take 1 capsule by mouth daily 90 capsule 1    vitamin D (ERGOCALCIFEROL) 1.25 MG (29516 UT) CAPS capsule Take 1 capsule by mouth once a week 4 capsule 2    hydroCHLOROthiazide (HYDRODIURIL) 12.5 MG tablet Take 1 tablet by mouth daily 90 tablet 1    carvedilol (COREG) 3.125 MG tablet Take 1 tablet by mouth 2 times daily (with meals) 180 tablet 1    Blood Pressure KIT 1 Units by Does not apply route 2 times daily 1 kit 0    aspirin 81 MG chewable tablet Take 1 tablet by mouth daily 30 tablet 5     No current facility-administered medications for this visit. No Known Allergies    Health Maintenance   Topic Date Due    Hepatitis C screen  Never done    Varicella vaccine (1 of 2 - 2-dose childhood series) Never done    COVID-19 Vaccine (1) Never done    Flu vaccine (Season Ended) 09/01/2021    Potassium monitoring  10/22/2021    Creatinine monitoring  10/22/2021    Lipid screen  08/22/2022    Diabetes screen  12/31/2023    DTaP/Tdap/Td vaccine (2 - Td) 05/15/2024    Hepatitis A vaccine  Aged Out    Hepatitis B vaccine  Aged Out    Hib vaccine  Aged Out    Meningococcal (ACWY) vaccine  Aged Out    Pneumococcal 0-64 years Vaccine  Aged Out    HIV screen  Discontinued       Subjective:      Review of Systems   Constitutional: Positive for chills. Negative for fever. Respiratory: Negative for shortness of breath and wheezing.     Cardiovascular: Negative for chest pain. Gastrointestinal: Negative for abdominal pain, bowel incontinence, constipation, diarrhea and nausea. Genitourinary: Positive for bladder incontinence, frequency and urgency. Negative for dysuria and hematuria. Musculoskeletal: Positive for back pain (lower back). Neurological: Positive for tingling (bilateral thigh), weakness (left leg) and numbness (bilateral). Negative for dizziness and light-headedness. Objective:     Vitals:    04/28/21 1056   BP: 130/60   Pulse: 93   SpO2: 99%   Weight: (!) 451 lb (204.6 kg)        Estimated body mass index is 54.9 kg/m² as calculated from the following:    Height as of 6/10/20: 6' 4\" (1.93 m). Weight as of this encounter: 451 lb (204.6 kg). Physical Exam  Constitutional:       Appearance: He is obese. Comments: Appears to be in severe pain, grimacing, tearful. Musculoskeletal:      Comments: Severe decrease in range of motion, difficulty standing. Neurological:      Mental Status: He is alert.          PHQ Scores 6/10/2020 10/11/2018 11/27/2017   PHQ2 Score 1 0 6   PHQ9 Score 1 0 18     Interpretation of Total Score Depression Severity: 1-4 = Minimal depression, 5-9 = Mild depression, 10-14 = Moderate depression, 15-19 = Moderately severe depression, 20-27 = Severe depression     Lab Results   Component Value Date     10/22/2020    K 3.4 (L) 10/22/2020     10/22/2020    CO2 28 10/22/2020    BUN 14 10/22/2020    CREATININE 0.7 10/22/2020    GLUCOSE 119 (H) 10/22/2020    CALCIUM 9.0 10/22/2020    PROT 7.1 10/29/2018    LABALBU 3.9 10/22/2020    BILITOT 0.5 10/22/2020    ALKPHOS 89 10/22/2020    AST 23 10/22/2020    ALT 34 10/22/2020    LABGLOM > 60.0 10/22/2020    GFRAA >60 08/24/2017    AGRATIO 1.4 10/29/2018    GLOB 2.8 10/22/2020     Lab Results   Component Value Date    LABA1C 5.0 12/31/2020    LABA1C 4.9 08/29/2017    LABA1C 5.0 08/22/2017       Lab Results   Component Value Date    CREATININE 0.7 10/22/2020 Assessment:     1. Acute bilateral low back pain with left-sided sciatica    2. Weakness of left leg    3. Other urinary incontinence        Plan:     Concern for cauda equina syndrome, patient [facility] to [facility] ED via wheelchair. Return if symptoms worsen or fail to improve. This note was generated completely or in part utilizing Dragon dictation speech recognition software. Occasionally, words are mistranscribed and despite editing, the text may contain inaccuracies due to incorrect word recognition. If further clarification is needed please contact the office at [phone].     Electronically signed by GORAN Mata CNP on 4/30/2021

## 2021-04-30 ASSESSMENT — ENCOUNTER SYMPTOMS: BOWEL INCONTINENCE: 0

## 2021-06-22 DIAGNOSIS — I10 ESSENTIAL HYPERTENSION: ICD-10-CM

## 2021-06-22 RX ORDER — HYDROCHLOROTHIAZIDE 12.5 MG/1
TABLET ORAL
Qty: 90 TABLET | Refills: 1 | Status: SHIPPED | OUTPATIENT
Start: 2021-06-22 | End: 2022-04-25

## 2021-06-22 RX ORDER — CARVEDILOL 3.12 MG/1
TABLET ORAL
Qty: 180 TABLET | Refills: 1 | Status: SHIPPED | OUTPATIENT
Start: 2021-06-22 | End: 2022-04-25

## 2021-06-22 NOTE — TELEPHONE ENCOUNTER
Karen Mayfield is calling to request a refill on the following medication(s):  Requested Prescriptions     Pending Prescriptions Disp Refills    carvedilol (COREG) 3.125 MG tablet [Pharmacy Med Name: CARVEDILOL 3.125 MG TABLET] 180 tablet 1     Sig: take 1 tablet by mouth twice a day with meals    hydroCHLOROthiazide (HYDRODIURIL) 12.5 MG tablet [Pharmacy Med Name: HYDROCHLOROTHIAZIDE 12.5 MG TB] 90 tablet 1     Sig: take 1 tablet by mouth once daily       Last Visit Date (If Applicable):  3/72/6446    Next Visit Date:    Visit date not found

## 2021-07-26 DIAGNOSIS — F33.1 MODERATE EPISODE OF RECURRENT MAJOR DEPRESSIVE DISORDER (HCC): ICD-10-CM

## 2021-07-26 RX ORDER — VENLAFAXINE HYDROCHLORIDE 75 MG/1
75 CAPSULE, EXTENDED RELEASE ORAL DAILY
Qty: 90 CAPSULE | Refills: 1 | Status: SHIPPED | OUTPATIENT
Start: 2021-07-26 | End: 2021-08-11 | Stop reason: SDUPTHER

## 2021-07-26 NOTE — TELEPHONE ENCOUNTER
Pt states he used to be on effexor 150 mg and would like to go back to that dose, he would like it sent to [facility].

## 2021-08-11 ENCOUNTER — TELEPHONE (OUTPATIENT)
Dept: FAMILY MEDICINE CLINIC | Age: 41
End: 2021-08-11

## 2021-08-11 DIAGNOSIS — F33.1 MODERATE EPISODE OF RECURRENT MAJOR DEPRESSIVE DISORDER (HCC): ICD-10-CM

## 2021-08-11 RX ORDER — VENLAFAXINE HYDROCHLORIDE 75 MG/1
75 CAPSULE, EXTENDED RELEASE ORAL DAILY
Qty: 90 CAPSULE | Refills: 1 | Status: SHIPPED | OUTPATIENT
Start: 2021-08-11 | End: 2021-09-23 | Stop reason: SDUPTHER

## 2021-08-11 NOTE — TELEPHONE ENCOUNTER
----- Message from [address] [facility] 1330 sent at 8/11/2021 12:39 PM EDT -----  Subject: Medication Problem    QUESTIONS  Name of Medication? venlafaxine (EFFEXOR XR) 75 MG extended release   capsule  Patient-reported dosage and instructions? 75 mg 2 tablet daily  What question or problem do you have with the medication? Called in for   only 1 75mg tablet daily  [facility] [address] [phone] Yulia Habermann [phone]  Pharmacy phone number (if available)? [phone]  Additional Information for Provider? Please call patient.  ---------------------------------------------------------------------------  --------------  CALL BACK INFO  What is the best way for the office to contact you? OK to leave message on   voicemail  Preferred Call Back Phone Number? [phone]  ---------------------------------------------------------------------------  --------------  SCRIPT ANSWERS  Relationship to Patient?  Self

## 2021-09-15 ENCOUNTER — HOSPITAL ENCOUNTER (OUTPATIENT)
Dept: CT IMAGING | Age: 41
Discharge: HOME | End: 2021-09-17
Payer: COMMERCIAL

## 2021-09-15 ENCOUNTER — HOSPITAL ENCOUNTER (OUTPATIENT)
Dept: INTERVENTIONAL RADIOLOGY/VASCULAR | Age: 41
Discharge: HOME | End: 2021-09-17
Payer: COMMERCIAL

## 2021-09-15 VITALS
DIASTOLIC BLOOD PRESSURE: 72 MMHG | OXYGEN SATURATION: 99 % | RESPIRATION RATE: 20 BRPM | SYSTOLIC BLOOD PRESSURE: 141 MMHG | HEART RATE: 67 BPM

## 2021-09-15 DIAGNOSIS — Z98.890 STATUS POST MYELOGRAM: ICD-10-CM

## 2021-09-15 DIAGNOSIS — M48.02 CERVICAL STENOSIS OF SPINAL CANAL: ICD-10-CM

## 2021-09-15 DIAGNOSIS — M47.14 MYELOPATHY OF THORACIC REGION: ICD-10-CM

## 2021-09-15 LAB
INR BLD: 1
PARTIAL THROMBOPLASTIN TIME: 25.6 SEC (ref 20.5–30.5)
PLATELET # BLD: 199 K/UL (ref 138–453)
PROTHROMBIN TIME: 10.6 SEC (ref 9.1–12.3)

## 2021-09-15 PROCEDURE — 7100000011 HC PHASE II RECOVERY - ADDTL 15 MIN

## 2021-09-15 PROCEDURE — 6360000002 HC RX W HCPCS: Performed by: RADIOLOGY

## 2021-09-15 PROCEDURE — 62305 MYELOGRAPHY LUMBAR INJECTION: CPT

## 2021-09-15 PROCEDURE — 72126 CT NECK SPINE W/DYE: CPT

## 2021-09-15 PROCEDURE — 2580000003 HC RX 258: Performed by: PHYSICIAN ASSISTANT

## 2021-09-15 PROCEDURE — 85610 PROTHROMBIN TIME: CPT

## 2021-09-15 PROCEDURE — 72129 CT CHEST SPINE W/DYE: CPT

## 2021-09-15 PROCEDURE — 72132 CT LUMBAR SPINE W/DYE: CPT

## 2021-09-15 PROCEDURE — 7100000010 HC PHASE II RECOVERY - FIRST 15 MIN

## 2021-09-15 PROCEDURE — 2709999900 HC NON-CHARGEABLE SUPPLY

## 2021-09-15 PROCEDURE — 85049 AUTOMATED PLATELET COUNT: CPT

## 2021-09-15 PROCEDURE — 85730 THROMBOPLASTIN TIME PARTIAL: CPT

## 2021-09-15 PROCEDURE — 6360000004 HC RX CONTRAST MEDICATION: Performed by: NEUROLOGICAL SURGERY

## 2021-09-15 RX ORDER — SODIUM CHLORIDE 9 MG/ML
INJECTION, SOLUTION INTRAVENOUS CONTINUOUS
Status: DISCONTINUED | OUTPATIENT
Start: 2021-09-15 | End: 2021-09-18 | Stop reason: HOSPADM

## 2021-09-15 RX ORDER — FENTANYL CITRATE 50 UG/ML
50 INJECTION, SOLUTION INTRAMUSCULAR; INTRAVENOUS ONCE
Status: COMPLETED | OUTPATIENT
Start: 2021-09-15 | End: 2021-09-15

## 2021-09-15 RX ORDER — TIZANIDINE 4 MG/1
TABLET ORAL
COMMUNITY
Start: 2021-06-10 | End: 2021-11-22 | Stop reason: ALTCHOICE

## 2021-09-15 RX ORDER — ACETAMINOPHEN 325 MG/1
650 TABLET ORAL EVERY 4 HOURS PRN
Status: DISCONTINUED | OUTPATIENT
Start: 2021-09-15 | End: 2021-09-18 | Stop reason: HOSPADM

## 2021-09-15 RX ADMIN — IOPAMIDOL 10 ML: 612 INJECTION, SOLUTION INTRATHECAL at 13:47

## 2021-09-15 RX ADMIN — SODIUM CHLORIDE: 9 INJECTION, SOLUTION INTRAVENOUS at 12:39

## 2021-09-15 RX ADMIN — FENTANYL CITRATE 50 MCG: 50 INJECTION, SOLUTION INTRAMUSCULAR; INTRAVENOUS at 13:51

## 2021-09-15 ASSESSMENT — PAIN DESCRIPTION - LOCATION
LOCATION: BACK

## 2021-09-15 ASSESSMENT — PAIN SCALES - GENERAL
PAINLEVEL_OUTOF10: 7
PAINLEVEL_OUTOF10: 7
PAINLEVEL_OUTOF10: 5

## 2021-09-15 ASSESSMENT — PAIN DESCRIPTION - PAIN TYPE
TYPE: CHRONIC PAIN

## 2021-09-15 ASSESSMENT — PAIN DESCRIPTION - DESCRIPTORS
DESCRIPTORS: PRESSURE

## 2021-09-15 ASSESSMENT — PAIN DESCRIPTION - PROGRESSION: CLINICAL_PROGRESSION: GRADUALLY IMPROVING

## 2021-09-15 ASSESSMENT — PAIN DESCRIPTION - ORIENTATION
ORIENTATION: MID;LOWER
ORIENTATION: LOWER;MID

## 2021-09-15 NOTE — PROGRESS NOTES
Pt comes to [facility]. Skin warm and dry. resp easy on room air. C/o low back pressure. Denies need for pain meds. Band aid to lumbar back is intact with small amount of red drainage. Lunch provided.

## 2021-09-15 NOTE — PROGRESS NOTES
Cervical -thoracic-lumbar MYELOGRAM    MIGUEL HICKS C.-TECH  JESUSITA D.-TECH  MIL EARL RN    10 MLS ISOVUE M 300 INJECTED INTO LUMBAR SPINE    TABLE TILTED TO PROPERLY DISPERSE CONTRAST ALONG SPINE    PT COMPLAINING OF SEVERE PAIN IN HIPS -FENTNAYL ADM FOR PAIN-SEE ORDERS    PT STABLE-WRITER TRANSPORTED PT TO CT FOR COMPLETION OF MYELOGRAM
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
## 2021-09-20 ENCOUNTER — NURSE TRIAGE (OUTPATIENT)
Dept: OTHER | Facility: CLINIC | Age: 41
End: 2021-09-20

## 2021-09-20 NOTE — TELEPHONE ENCOUNTER
Received call from Amrik at [facility]-service center [facility] with [employer] Complaint. Brief description of triage: back pain and loss of control of bowel and bladder attempt to call back did not answer VM full         Care advice provided, patient verbalizes understanding; denies any other questions or concerns; instructed to call back for any new or worsening symptoms. Attention Provider: Thank you for allowing me to participate in the care of your patient. The patient was connected to triage in response to information provided to the [facility]. Please do not respond through this encounter as the response is not directed to a shared pool. Reason for Disposition   Message left on unidentified voice mail. Phone number verified.     Protocols used: NO CONTACT OR DUPLICATE CONTACT CALL-ADULT-[place]

## 2021-09-21 DIAGNOSIS — M54.41 CHRONIC RIGHT-SIDED LOW BACK PAIN WITH RIGHT-SIDED SCIATICA: Primary | ICD-10-CM

## 2021-09-21 DIAGNOSIS — G89.29 CHRONIC RIGHT-SIDED LOW BACK PAIN WITH RIGHT-SIDED SCIATICA: Primary | ICD-10-CM

## 2021-09-21 RX ORDER — OXYCODONE HYDROCHLORIDE AND ACETAMINOPHEN 5; 325 MG/1; MG/1
1 TABLET ORAL EVERY 6 HOURS PRN
Qty: 12 TABLET | Refills: 0 | Status: SHIPPED | OUTPATIENT
Start: 2021-09-21 | End: 2021-09-23 | Stop reason: ALTCHOICE

## 2021-09-21 NOTE — TELEPHONE ENCOUNTER
Called patient to discuss concerns for back pain, and loss of bladder control. Patient states this is not new for him. It has been an ongoing issue. He has been evaluated in the ER and also had a resent surgery. He follows with a neurosurgeon but has been unable to get in touch with him. States he is out of the office until Thursday. He is requesting something for the back pain until he can get see the specialist. Will send short supply to get patient through the next few days.

## 2021-09-23 ENCOUNTER — OFFICE VISIT (OUTPATIENT)
Dept: FAMILY MEDICINE CLINIC | Age: 41
End: 2021-09-23
Payer: COMMERCIAL

## 2021-09-23 VITALS
BODY MASS INDEX: 54.69 KG/M2 | DIASTOLIC BLOOD PRESSURE: 64 MMHG | SYSTOLIC BLOOD PRESSURE: 130 MMHG | OXYGEN SATURATION: 98 % | HEART RATE: 89 BPM | WEIGHT: 315 LBS

## 2021-09-23 DIAGNOSIS — F17.220 NICOTINE DEPENDENCE, CHEWING TOBACCO, UNCOMPLICATED: ICD-10-CM

## 2021-09-23 DIAGNOSIS — R53.83 FATIGUE, UNSPECIFIED TYPE: ICD-10-CM

## 2021-09-23 DIAGNOSIS — E66.8 EXTREME OBESITY: Primary | ICD-10-CM

## 2021-09-23 DIAGNOSIS — M54.41 CHRONIC RIGHT-SIDED LOW BACK PAIN WITH RIGHT-SIDED SCIATICA: ICD-10-CM

## 2021-09-23 DIAGNOSIS — M25.50 MULTIPLE JOINT PAIN: ICD-10-CM

## 2021-09-23 DIAGNOSIS — F33.1 MODERATE EPISODE OF RECURRENT MAJOR DEPRESSIVE DISORDER (HCC): ICD-10-CM

## 2021-09-23 DIAGNOSIS — E78.2 MIXED HYPERLIPIDEMIA: ICD-10-CM

## 2021-09-23 DIAGNOSIS — G89.29 CHRONIC RIGHT-SIDED LOW BACK PAIN WITH RIGHT-SIDED SCIATICA: ICD-10-CM

## 2021-09-23 DIAGNOSIS — I10 ESSENTIAL HYPERTENSION: ICD-10-CM

## 2021-09-23 DIAGNOSIS — E55.9 VITAMIN D DEFICIENCY: ICD-10-CM

## 2021-09-23 DIAGNOSIS — F41.1 ANXIETY, GENERALIZED: ICD-10-CM

## 2021-09-23 DIAGNOSIS — F33.2 SEVERE EPISODE OF RECURRENT MAJOR DEPRESSIVE DISORDER, WITHOUT PSYCHOTIC FEATURES (HCC): ICD-10-CM

## 2021-09-23 PROCEDURE — 99214 OFFICE O/P EST MOD 30 MIN: CPT | Performed by: NURSE PRACTITIONER

## 2021-09-23 PROCEDURE — G8427 DOCREV CUR MEDS BY ELIG CLIN: HCPCS | Performed by: NURSE PRACTITIONER

## 2021-09-23 PROCEDURE — 4004F PT TOBACCO SCREEN RCVD TLK: CPT | Performed by: NURSE PRACTITIONER

## 2021-09-23 PROCEDURE — G8417 CALC BMI ABV UP PARAM F/U: HCPCS | Performed by: NURSE PRACTITIONER

## 2021-09-23 RX ORDER — VENLAFAXINE HYDROCHLORIDE 150 MG/1
150 CAPSULE, EXTENDED RELEASE ORAL DAILY
Qty: 90 CAPSULE | Refills: 2 | Status: SHIPPED | OUTPATIENT
Start: 2021-09-23 | End: 2022-05-04 | Stop reason: SDUPTHER

## 2021-09-23 NOTE — PATIENT INSTRUCTIONS
Thank you for enrolling in [address] Please follow the instructions below to securely access your online medical record. [employer] allows you to send messages to your doctor, view your test results, renew your prescriptions, schedule appointments, and more. How Do I Sign Up? 1. In your Internet browser, go to [email or web address]  2. Click on the Sign Up Now link in the Sign In box. You will see the New Member Sign Up page. 3. Enter your [employer] Access Code exactly as it appears below. You will not need to use this code after youve completed the sign-up process. If you do not sign up before the expiration date, you must request a new code. [employer] Access Code: Activation code not generated  Current [employer] Status: Active    4. Enter your Social Security Number ([id number]) and Date of Birth (mm/dd/yyyy) as indicated and click Submit. You will be taken to the next sign-up page. 5. Create a [employer] ID. This will be your [employer] login ID and cannot be changed, so think of one that is secure and easy to remember. 6. Create a [employer] password. You can change your password at any time. 7. Enter your Password Reset Question and Answer. This can be used at a later time if you forget your password. 8. Enter your e-mail address. You will receive e-mail notification when new information is available in [address] 9. Click Sign Up. You can now view your medical record. Additional Information  If you have questions, please contact your physician practice where you receive care. Remember, [employer] is NOT to be used for urgent needs. For medical emergencies, dial 911.

## 2021-09-23 NOTE — PROGRESS NOTES
01/07/21 (!) 461 lb 1.6 oz (209.2 kg)   12/30/20 (!) 460 lb (208.7 kg)   06/10/20 (!) 440 lb (199.6 kg)   05/04/20 (!) 400 lb (181.4 kg)   04/16/19 (!) 444 lb (201.4 kg)   10/11/18 (!) 425 lb 3 oz (192.9 kg)   05/17/18 (!) 423 lb (191.9 kg)   04/18/18 (!) 415 lb (188.2 kg)       BP Readings from Last 3 Encounters:   09/23/21 130/64   09/15/21 (!) 141/72   04/28/21 130/60            Past Medical History:   Diagnosis Date    Anxiety     Chronic lumbar pain     Chronic neck pain     Depression     including hospitalization    Extreme obesity     Headache     History of migraine     Hyperlipidemia     Hypertension     Intractable migraine with aura without status migrainosus 8/22/2017    Kidney stones     Lumbar disc disease     Neuropathy     Right sided weakness     Snores     no cpap      Past Surgical History:   Procedure Laterality Date    INGUINAL HERNIA REPAIR Right     LITHOTRIPSY      LUMBAR LAMINECTOMY  10/31/2018    LUMBAR LAMINECTOMY  06/2021    Dr. Mason Carney       Family History   Problem Relation Age of Onset    High Blood Pressure Father     Alzheimer's Disease Maternal Grandmother        Social History     Tobacco Use    Smoking status: Never Smoker    Smokeless tobacco: Current User     Types: Chew   Substance Use Topics    Alcohol use: Not Currently     Alcohol/week: 0.0 standard drinks     Comment: occasionally      Current Outpatient Medications   Medication Sig Dispense Refill    venlafaxine (EFFEXOR XR) 150 MG extended release capsule Take 1 capsule by mouth daily 90 capsule 2    naltrexone-buPROPion (CONTRAVE) 8-90 MG per extended release tablet Start 1 tablet PO qam x1 week, then 1 tab PO bid x1 week, then 2 tabs PO qam and 1 tab PO qpm x1 week. Then 2 tabs PO qam and 2 tabs PO qpm x1 week.  70 tablet 0    tiZANidine (ZANAFLEX) 4 MG tablet       carvedilol (COREG) 3.125 MG tablet take 1 tablet by mouth twice a day with meals 180 tablet 1    hydroCHLOROthiazide (HYDRODIURIL) 12.5 MG tablet take 1 tablet by mouth once daily 90 tablet 1    vitamin D (ERGOCALCIFEROL) 1.25 MG (90803 UT) CAPS capsule Take 1 capsule by mouth once a week 4 capsule 2    Blood Pressure KIT 1 Units by Does not apply route 2 times daily 1 kit 0    aspirin 81 MG chewable tablet Take 1 tablet by mouth daily 30 tablet 5     No current facility-administered medications for this visit. No Known Allergies    Health Maintenance   Topic Date Due    Hepatitis C screen  Never done    Varicella vaccine (1 of 2 - 2-dose childhood series) Never done    COVID-19 Vaccine (1) Never done    Flu vaccine (1) Never done    Potassium monitoring  10/22/2021    Creatinine monitoring  10/22/2021    Lipid screen  08/22/2022    DTaP/Tdap/Td vaccine (2 - Td or Tdap) 05/15/2024    Hepatitis A vaccine  Aged Out    Hepatitis B vaccine  Aged Out    Hib vaccine  Aged Out    Meningococcal (ACWY) vaccine  Aged Out    Pneumococcal 0-64 years Vaccine  Aged Out    HIV screen  Discontinued       Subjective:      Review of Systems   Constitutional: Positive for fatigue and unexpected weight change (gaining). Negative for appetite change, chills and fever. HENT: Negative. Eyes: Negative. Respiratory: Negative for cough, shortness of breath and wheezing. Cardiovascular: Negative for chest pain, palpitations and leg swelling. Gastrointestinal: Negative for abdominal pain, constipation and diarrhea. Endocrine: Negative for cold intolerance, heat intolerance, polydipsia, polyphagia and polyuria. Genitourinary: Negative. Musculoskeletal: Positive for back pain (chronic lower back). Negative for arthralgias and myalgias. Skin: Negative. Allergic/Immunologic: Negative for environmental allergies and food allergies. Neurological: Negative for dizziness, weakness, light-headedness and headaches. Psychiatric/Behavioral: Positive for dysphoric mood.  Negative for agitation, self-injury, sleep disturbance and suicidal ideas. The patient is nervous/anxious. Objective:     /64   Pulse 89   Wt (!) 449 lb 4.8 oz (203.8 kg)   SpO2 98%   BMI 54.69 kg/m²     Physical Exam  Constitutional:       Appearance: Normal appearance. He is well-developed and well-groomed. He is obese. HENT:      Head: Normocephalic. Nose: Nose normal.      Mouth/Throat:      Mouth: Mucous membranes are moist.      Pharynx: Oropharynx is clear. Eyes:      Conjunctiva/sclera: Conjunctivae normal.      Pupils: Pupils are equal, round, and reactive to light. Neck:      Thyroid: No thyromegaly. Vascular: No carotid bruit. Cardiovascular:      Rate and Rhythm: Normal rate and regular rhythm. Heart sounds: Normal heart sounds. Pulmonary:      Effort: Pulmonary effort is normal.      Breath sounds: Normal breath sounds. No wheezing. Abdominal:      Comments: obese   Musculoskeletal:      Cervical back: Neck supple. Right lower leg: No edema. Left lower leg: No edema. Lymphadenopathy:      Cervical: No cervical adenopathy. Skin:     Capillary Refill: Capillary refill takes less than 2 seconds. Neurological:      Mental Status: He is alert and oriented to person, place, and time. Gait: Gait abnormal (Antalgic). Psychiatric:         Mood and Affect: Mood is anxious and depressed. Behavior: Behavior is cooperative.          Lab Results   Component Value Date     10/22/2020    K 3.4 (L) 10/22/2020     10/22/2020    CO2 28 10/22/2020    BUN 14 10/22/2020    CREATININE 0.7 10/22/2020    GLUCOSE 119 (H) 10/22/2020    CALCIUM 9.0 10/22/2020    PROT 7.1 10/29/2018    LABALBU 3.9 10/22/2020    BILITOT 0.5 10/22/2020    ALKPHOS 89 10/22/2020    AST 23 10/22/2020    ALT 34 10/22/2020    LABGLOM > 60.0 10/22/2020    GFRAA >60 08/24/2017    AGRATIO 1.4 10/29/2018    GLOB 2.8 10/22/2020     Lab Results   Component Value Date    LABA1C 5.0 12/31/2020    LABA1C 4.9 08/29/2017 LABA1C 5.0 08/22/2017       Lab Results   Component Value Date    CREATININE 0.7 10/22/2020       Assessment:     1. Extreme obesity    2. Essential hypertension    3. Moderate episode of recurrent major depressive disorder ([facility] [address]    4. Chronic right-sided low back pain with right-sided sciatica    5. Mixed hyperlipidemia    6. Anxiety, generalized    7. Severe episode of recurrent major depressive disorder, without psychotic features ([facility] [address]    8. Multiple joint pain    9. Nicotine dependence, chewing tobacco, uncomplicated    10. Fatigue, unspecified type    11. Vitamin D deficiency        Plan:       Orders Placed This Encounter   Medications    venlafaxine (EFFEXOR XR) 150 MG extended release capsule     Sig: Take 1 capsule by mouth daily     Dispense:  90 capsule     Refill:  2    naltrexone-buPROPion (CONTRAVE) 8-90 MG per extended release tablet     Sig: Start 1 tablet PO qam x1 week, then 1 tab PO bid x1 week, then 2 tabs PO qam and 1 tab PO qpm x1 week. Then 2 tabs PO qam and 2 tabs PO qpm x1 week. Dispense:  70 tablet     Refill:  0       Obesity with BMI and comorbidities as noted above.  Patient readiness to commit to diet and activity changes: good   Barriers to weight loss: chronic back pain    Start Contrave as discussed. Discussed use, benefit, and side effects of prescribed medications. All patient questions answered. Patient voiced understanding. Instructed to continue current medications, diet and exercise. Patient agreed with treatment plan. Follow up as directed. Return in about 4 weeks (around 10/21/2021).     Electronically signed by Brooks Angelucci, APRN - CNP on 10/3/2021

## 2021-10-03 PROBLEM — F33.1 MODERATE EPISODE OF RECURRENT MAJOR DEPRESSIVE DISORDER (HCC): Status: RESOLVED | Noted: 2021-01-16 | Resolved: 2021-10-03

## 2021-10-03 PROBLEM — M54.50 ACUTE LEFT-SIDED LOW BACK PAIN WITHOUT SCIATICA: Status: ACTIVE | Noted: 2021-04-29

## 2021-10-03 PROBLEM — R63.5 WEIGHT GAIN: Status: RESOLVED | Noted: 2021-01-16 | Resolved: 2021-10-03

## 2021-10-03 PROBLEM — G43.119 INTRACTABLE MIGRAINE WITH AURA WITHOUT STATUS MIGRAINOSUS: Status: RESOLVED | Noted: 2017-08-22 | Resolved: 2021-10-03

## 2021-10-03 ASSESSMENT — ENCOUNTER SYMPTOMS
BACK PAIN: 1
WHEEZING: 0
CONSTIPATION: 0
SHORTNESS OF BREATH: 0
DIARRHEA: 0
COUGH: 0
ABDOMINAL PAIN: 0
EYES NEGATIVE: 1

## 2021-11-22 ENCOUNTER — OFFICE VISIT (OUTPATIENT)
Dept: FAMILY MEDICINE CLINIC | Age: 41
End: 2021-11-22
Payer: COMMERCIAL

## 2021-11-22 VITALS
OXYGEN SATURATION: 99 % | WEIGHT: 315 LBS | HEART RATE: 76 BPM | SYSTOLIC BLOOD PRESSURE: 130 MMHG | DIASTOLIC BLOOD PRESSURE: 74 MMHG | BODY MASS INDEX: 55.6 KG/M2

## 2021-11-22 DIAGNOSIS — M54.50 ACUTE EXACERBATION OF CHRONIC LOW BACK PAIN: Primary | ICD-10-CM

## 2021-11-22 DIAGNOSIS — M54.41 CHRONIC RIGHT-SIDED LOW BACK PAIN WITH RIGHT-SIDED SCIATICA: ICD-10-CM

## 2021-11-22 DIAGNOSIS — G89.29 CHRONIC RIGHT-SIDED LOW BACK PAIN WITH RIGHT-SIDED SCIATICA: ICD-10-CM

## 2021-11-22 DIAGNOSIS — G89.29 ACUTE EXACERBATION OF CHRONIC LOW BACK PAIN: Primary | ICD-10-CM

## 2021-11-22 PROCEDURE — G8427 DOCREV CUR MEDS BY ELIG CLIN: HCPCS | Performed by: NURSE PRACTITIONER

## 2021-11-22 PROCEDURE — G8484 FLU IMMUNIZE NO ADMIN: HCPCS | Performed by: NURSE PRACTITIONER

## 2021-11-22 PROCEDURE — 99214 OFFICE O/P EST MOD 30 MIN: CPT | Performed by: NURSE PRACTITIONER

## 2021-11-22 PROCEDURE — G8417 CALC BMI ABV UP PARAM F/U: HCPCS | Performed by: NURSE PRACTITIONER

## 2021-11-22 PROCEDURE — 4004F PT TOBACCO SCREEN RCVD TLK: CPT | Performed by: NURSE PRACTITIONER

## 2021-11-22 RX ORDER — IBUPROFEN 600 MG/1
TABLET ORAL
COMMUNITY
Start: 2021-11-18 | End: 2022-08-03

## 2021-11-22 RX ORDER — CYCLOBENZAPRINE HCL 10 MG
TABLET ORAL
COMMUNITY
Start: 2021-11-18 | End: 2022-05-04 | Stop reason: SDUPTHER

## 2021-11-22 RX ORDER — OXYCODONE HYDROCHLORIDE AND ACETAMINOPHEN 5; 325 MG/1; MG/1
1 TABLET ORAL EVERY 6 HOURS PRN
Qty: 20 TABLET | Refills: 0 | Status: SHIPPED | OUTPATIENT
Start: 2021-11-22 | End: 2021-11-27

## 2021-11-22 SDOH — ECONOMIC STABILITY: FOOD INSECURITY: WITHIN THE PAST 12 MONTHS, THE FOOD YOU BOUGHT JUST DIDN'T LAST AND YOU DIDN'T HAVE MONEY TO GET MORE.: NEVER TRUE

## 2021-11-22 SDOH — ECONOMIC STABILITY: FOOD INSECURITY: WITHIN THE PAST 12 MONTHS, YOU WORRIED THAT YOUR FOOD WOULD RUN OUT BEFORE YOU GOT MONEY TO BUY MORE.: NEVER TRUE

## 2021-11-22 ASSESSMENT — SOCIAL DETERMINANTS OF HEALTH (SDOH): HOW HARD IS IT FOR YOU TO PAY FOR THE VERY BASICS LIKE FOOD, HOUSING, MEDICAL CARE, AND HEATING?: NOT HARD AT ALL

## 2021-11-22 NOTE — PROGRESS NOTES
[address] [address] [address]  Dept: [phone]  Dept Fax: [phone]    History and Physical  Patient:  Bertin Luis  YOB: 1980  Date of Service:  2021    Subjective:   Beritn Luis (:  1980) is a 37 y.o. male, Established patient, here for evaluation of the following chief complaint(s):    Chief Complaint   Patient presents with    Back Pain     c/o lower back pain, was in ER last Wed did have CT of back and has some narrowing of discs and is sched to see neurosurgeon on  but still having a lot of pain    Weight Gain     would like to discuss weight and ways       HPI  Patient presents to the office for follow-up of lower back pain. He was winterizing a boat, going down stairs, right hip rolled forward, he did not fall. He felt a sharp pain, felt like he had an \"electrical shock\", and a knife stab to the right groin. He went to [facility] ED and was given a Toradol injection, Dilaudid, and Flexeril. Medications did ease his pain temporarily. Pain is currently rated 7/10 and located to the lower back and entire right hip. He also has numbness to the right anterior thigh. He has a scheduled appointment with neurosurgery (Dr. Hermelinda Jay) on 2021. Patient states CT completed and shows narrowing has worsened since previous surgery. He denies current loss of bladder/bowel function.     The ASCVD Risk score (Vianca Monique, et al., 2013) failed to calculate for the following reasons:    Cannot find a previous HDL lab    Cannot find a previous total cholesterol lab     BP Readings from Last 3 Encounters:   21 130/74   21 130/64   09/15/21 (!) 141/72      Pulse Readings from Last 3 Encounters:   21 76   21 89   09/15/21 67      Wt Readings from Last 3 Encounters:   21 (!) 456 lb 12.8 oz (207.2 kg)   21 (!) 449 lb 4.8 oz (203.8 kg)   21 (!) 451 lb (204.6 kg)        No Known Allergies    Current Outpatient Medications   Medication Sig Dispense Refill    cyclobenzaprine (FLEXERIL) 10 MG tablet take 1 tablet by mouth twice a day if needed for muscle spasm      ibuprofen (ADVIL;MOTRIN) 600 MG tablet take 1 tablet by mouth every 6 hours if needed for pain      venlafaxine (EFFEXOR XR) 150 MG extended release capsule Take 1 capsule by mouth daily 90 capsule 2    carvedilol (COREG) 3.125 MG tablet take 1 tablet by mouth twice a day with meals 180 tablet 1    hydroCHLOROthiazide (HYDRODIURIL) 12.5 MG tablet take 1 tablet by mouth once daily 90 tablet 1    vitamin D (ERGOCALCIFEROL) 1.25 MG (79434 UT) CAPS capsule Take 1 capsule by mouth once a week 4 capsule 2    Blood Pressure KIT 1 Units by Does not apply route 2 times daily 1 kit 0     No current facility-administered medications for this visit. Past Medical History:   Diagnosis Date    Anxiety     Chronic lumbar pain     Chronic neck pain     Depression     including hospitalization    Extreme obesity     Headache     History of migraine     Hyperlipidemia     Hypertension     Intractable migraine with aura without status migrainosus 8/22/2017    Kidney stones     Lumbar disc disease     Neuropathy     Right sided weakness     Snores     no cpap       Past Surgical History:   Procedure Laterality Date    INGUINAL HERNIA REPAIR Right     LITHOTRIPSY      LUMBAR LAMINECTOMY  10/31/2018    LUMBAR LAMINECTOMY  06/2021    Dr. Jose Jefferson     Family History   Problem Relation Age of Onset    High Blood Pressure Father     Alzheimer's Disease Maternal Grandmother        Review of Systems:     Review of Systems   Constitutional: Positive for activity change (Decreased due to lower back pain). Negative for appetite change, chills, fatigue and fever. HENT: Negative. Respiratory: Negative for cough and shortness of breath. Cardiovascular: Negative for chest pain.    Genitourinary: Negative for difficulty urinating. Musculoskeletal: Positive for arthralgias (right hip), back pain (lower back) and gait problem. Neurological: Positive for numbness (right anterior thigh). Physical Exam:     Vitals:    11/22/21 0847   BP: 130/74   Pulse: 76   SpO2: 99%   Weight: (!) 456 lb 12.8 oz (207.2 kg)      Body mass index is 55.6 kg/m². Physical Exam  Constitutional:       Appearance: He is obese. Comments: Patient appears to be uncomfortable. Frequently shifting positions and grimacing. HENT:      Head: Normocephalic. Eyes:      Conjunctiva/sclera: Conjunctivae normal.   Cardiovascular:      Rate and Rhythm: Normal rate and regular rhythm. Pulses: Normal pulses. Heart sounds: Normal heart sounds. Pulmonary:      Effort: Pulmonary effort is normal.      Breath sounds: Normal breath sounds. No wheezing. Abdominal:      Comments: obese   Musculoskeletal:      Cervical back: Neck supple. Lumbar back: Spasms and tenderness present. Decreased range of motion (Moderately decreased). Positive right straight leg raise test. Negative left straight leg raise test.      Right hip: Tenderness (generalized) present. Decreased range of motion. Neurological:      Mental Status: He is alert and oriented to person, place, and time. Gait: Gait abnormal (antalgic). Assessment/Plan:   1. Acute exacerbation of chronic low back pain  -     oxyCODONE-acetaminophen (PERCOCET) 5-325 MG per tablet; Take 1 tablet by mouth every 6 hours as needed for Pain for up to 5 days. Intended supply: 5 days. Take lowest dose possible to manage pain, Disp-20 tablet, R-0Normal  2. Chronic right-sided low back pain with right-sided sciatica  -     oxyCODONE-acetaminophen (PERCOCET) 5-325 MG per tablet; Take 1 tablet by mouth every 6 hours as needed for Pain for up to 5 days. Intended supply: 5 days.  Take lowest dose possible to manage pain, Disp-20 tablet, R-0Normal     Will provide short supply of pain medication until patient is evaluated by neurosurgery. All patient questions answered. Patient agreed with treatment plan. Follow up as directed. Controlled Substances Monitoring: Periodic Controlled Substance Monitoring: No signs of potential drug abuse or diversion identified. GORAN Brantley CNP)       Return if symptoms worsen or fail to improve. Please note that this chart was generated using voice recognition Dragon dictation software. Although every effort was made to ensure the accuracy of this automated transcription, some errors in transcription may have occurred.     Electronically signed by GORAN Greenwood CNP on 11/28/2021

## 2021-11-28 ASSESSMENT — ENCOUNTER SYMPTOMS
COUGH: 0
BACK PAIN: 1
SHORTNESS OF BREATH: 0

## 2021-12-31 ENCOUNTER — OFFICE VISIT (OUTPATIENT)
Dept: PRIMARY CARE CLINIC | Age: 41
End: 2021-12-31
Payer: COMMERCIAL

## 2021-12-31 ENCOUNTER — HOSPITAL ENCOUNTER (OUTPATIENT)
Age: 41
Setting detail: SPECIMEN
Discharge: HOME | End: 2021-12-31
Payer: COMMERCIAL

## 2021-12-31 VITALS
HEART RATE: 76 BPM | OXYGEN SATURATION: 98 % | HEIGHT: 76 IN | WEIGHT: 315 LBS | BODY MASS INDEX: 38.36 KG/M2 | DIASTOLIC BLOOD PRESSURE: 68 MMHG | SYSTOLIC BLOOD PRESSURE: 120 MMHG | TEMPERATURE: 96.6 F

## 2021-12-31 DIAGNOSIS — Z20.822 PERSON UNDER INVESTIGATION FOR COVID-19: ICD-10-CM

## 2021-12-31 DIAGNOSIS — R09.81 CONGESTION OF NASAL SINUS: ICD-10-CM

## 2021-12-31 DIAGNOSIS — R05.9 COUGH: ICD-10-CM

## 2021-12-31 DIAGNOSIS — U07.1 COVID-19 VIRUS INFECTION: ICD-10-CM

## 2021-12-31 DIAGNOSIS — U07.1 COVID-19 VIRUS INFECTION: Primary | ICD-10-CM

## 2021-12-31 DIAGNOSIS — R53.83 FATIGUE, UNSPECIFIED TYPE: ICD-10-CM

## 2021-12-31 PROCEDURE — U0003 INFECTIOUS AGENT DETECTION BY NUCLEIC ACID (DNA OR RNA); SEVERE ACUTE RESPIRATORY SYNDROME CORONAVIRUS 2 (SARS-COV-2) (CORONAVIRUS DISEASE [COVID-19]), AMPLIFIED PROBE TECHNIQUE, MAKING USE OF HIGH THROUGHPUT TECHNOLOGIES AS DESCRIBED BY CMS-2020-01-R: HCPCS

## 2021-12-31 PROCEDURE — G8417 CALC BMI ABV UP PARAM F/U: HCPCS | Performed by: NURSE PRACTITIONER

## 2021-12-31 PROCEDURE — 99203 OFFICE O/P NEW LOW 30 MIN: CPT | Performed by: NURSE PRACTITIONER

## 2021-12-31 PROCEDURE — G8484 FLU IMMUNIZE NO ADMIN: HCPCS | Performed by: NURSE PRACTITIONER

## 2021-12-31 PROCEDURE — 4004F PT TOBACCO SCREEN RCVD TLK: CPT | Performed by: NURSE PRACTITIONER

## 2021-12-31 PROCEDURE — U0005 INFEC AGEN DETEC AMPLI PROBE: HCPCS

## 2021-12-31 PROCEDURE — G8427 DOCREV CUR MEDS BY ELIG CLIN: HCPCS | Performed by: NURSE PRACTITIONER

## 2021-12-31 ASSESSMENT — PATIENT HEALTH QUESTIONNAIRE - PHQ9
1. LITTLE INTEREST OR PLEASURE IN DOING THINGS: 0
2. FEELING DOWN, DEPRESSED OR HOPELESS: 0
SUM OF ALL RESPONSES TO PHQ9 QUESTIONS 1 & 2: 0
SUM OF ALL RESPONSES TO PHQ QUESTIONS 1-9: 0

## 2021-12-31 NOTE — PATIENT INSTRUCTIONS
Will notify you of covid test result as soon as available. You should isolate at home in an area away from family. If you must be around family members, please wear a mask. Quarantine at home until result is available. This means do not go to work/school, attend family gatherings, or invite others to your home until you know your test results. A work/school note will be provided for you. Symptoms appear viral; no antibiotic warranted at this time. The following are measures you can try at home to help provide symptom relief:    --Increase your water intake to help thin secretions and maintain hydration. --May try mucinex (guaifenesin) to help with congestion and robitussin (dextromethorphan) for persistent cough. Use cool mist humidifier at bedtime to moisturize air. Use nasal saline rinse as needed for congestion. --Tylenol or ibuprofen for fever/body aches/headache. Warm/cold packs to forehead and back of neck can help with headache pain. Warm baths/showers can sooth body aches also. Practice good hand hygiene and cover your cough and sneeze to prevent passing virus on. If symptoms worsen or fail to improve, please return to clinic. If you develop severe worsening of symptoms such as chest pain or difficulty breathing, please go to ER. Patient Education        Learning About Coronavirus [phone])  What is coronavirus (COVID-19)? COVID-19 is a disease caused by a type of coronavirus. This illness was first found in December 2019. It has since spread worldwide. Coronaviruses are a large group of viruses. They cause the common cold. They also cause more serious illnesses like [place] respiratory syndrome (MERS) and severe acute respiratory syndrome (SARS). COVID-19 is caused by a novel coronavirus. That means it's a new type that has not been seen in people before. What are the symptoms? COVID-19 symptoms may include:  · Fever. · Cough. · Trouble breathing.   · Chills or repeated shaking with chills. · Muscle and body aches. · Headache. · Sore throat. · New loss of taste or smell. · Vomiting. · Diarrhea. In severe cases, COVID-19 can cause pneumonia and make it hard to breathe without help from a machine. It can cause death. How is it diagnosed? COVID-19 is diagnosed with a viral test. This may also be called a PCR test or antigen test. It looks for evidence of the virus in your breathing passages or lungs (respiratory system). The test is most often done on a sample from the nose, throat, or lungs. It's sometimes done on a sample of saliva. One way a sample is collected is by putting a long swab into the back of your nose. How is it treated? Mild cases of COVID-19 can be treated at home. Serious cases need treatment in the hospital. Treatment may include medicines to reduce symptoms, plus breathing support such as oxygen therapy or a ventilator. Some people may be placed on their belly to help their oxygen levels. Treatments that may help people who have COVID-19 include:  Antiviral medicines. These medicines treat viral infections. Remdesivir is an example. Immune-based therapy. These medicines help the immune system fight COVID-19. Examples include monoclonal antibodies. Blood thinners. These medicines help prevent blood clots. People with severe illness are at risk for blood clots. How can you protect yourself and others? · Get vaccinated. · Avoid sick people. · Cover your mouth with a tissue when you cough or sneeze. · Wash your hands often, especially after you cough or sneeze. Use soap and water, and scrub for at least 20 seconds. If soap and water aren't available, use an alcohol-based hand . · Avoid touching your mouth, nose, and eyes. Be sure to follow all instructions from the [facility] and your local health authorities. Here are some examples of specific precautions you may need to take.   · If you are not fully vaccinated:  ? Wear a mask if you have to go to public areas. ? Avoid crowds and try to stay at least 6 feet away from other people. · If you have been exposed to the virus and are not fully vaccinated:  ? Stay home. Don't go to school, work, or public areas. And don't use public transportation, ride-shares, or taxis unless you have no choice. ? Wear a mask if you have to go to public areas, like the pharmacy. · Even if you're fully vaccinated, there's still a small chance you can get and spread COVID-19. If you live in an area where COVID-19 is spreading quickly, wear a mask if you have to go to indoor public areas. You might also want to wear a mask in crowded outdoor areas if you:  ? Have certain health conditions. ? Live with someone who has a compromised immune system. ? Live with someone who is not fully vaccinated. · If you have been exposed and you are fully vaccinated:  ? Talk to your doctor. You may need a COVID-19 test.  ? Wear a mask in public indoor spaces for 14 days or until you test negative for COVID-19. If you're sick:  · Leave your home only if you need to get medical care. But call the doctor's office first so they know you're coming. And wear a mask. · Wear a mask whenever you're around other people. · Limit contact with pets and people in your home. If possible, stay in a separate bedroom and use a separate bathroom. · Clean and disinfect your home every day. Use household  and disinfectant wipes or sprays. Take special care to clean things that you touch with your hands. How can you self-isolate when you have COVID-19? If you have COVID-19, there are things you can do to help avoid spreading the virus to others. · Limit contact with people in your home. If possible, stay in a separate bedroom and use a separate bathroom. · Wear a mask when you are around other people. · If you have to leave home, avoid crowds and try to stay at least 6 feet away from other people.   · Avoid contact with pets and other animals. · Cover your mouth and nose with a tissue when you cough or sneeze. Then throw it in the trash right away. · Wash your hands often, especially after you cough or sneeze. Use soap and water, and scrub for at least 20 seconds. If soap and water aren't available, use an alcohol-based hand . · Don't share personal household items. These include bedding, towels, cups and glasses, and eating utensils. · [address] in the warmest water allowed for the fabric type, and dry it completely. It's okay to wash other people's laundry with yours. · Clean and disinfect your home. Use household  and disinfectant wipes or sprays. When should you call for help? Call 911 anytime you think you may need emergency care. For example, call if you have life-threatening symptoms, such as:    · You have severe trouble breathing. (You can't talk at all.)     · You have constant chest pain or pressure.     · You are severely dizzy or lightheaded.     · You are confused or can't think clearly.     · You have pale, gray, or blue-colored skin or lips.     · You pass out (lose consciousness) or are very hard to wake up. Call your doctor now or seek immediate medical care if:    · You have moderate trouble breathing. (You can't speak a full sentence.)     · You are coughing up blood (more than about 1 teaspoon).     · You have signs of low blood pressure. These include feeling lightheaded; being too weak to stand; and having cold, pale, clammy skin. Watch closely for changes in your health, and be sure to contact your doctor if:    · Your symptoms get worse.     · You are not getting better as expected.     · You have new or worse symptoms of anxiety, depression, nightmares, or flashbacks. Call before you go to the doctor's office. Follow their instructions. And wear a mask. Current as of: July 1, 2021               Content Version: 13.1  © 2221-8530 [employer], [employer].    Care instructions instruction, always ask your healthcare professional. [address] [place] [address] any warranty or liability for your use of this information.

## 2021-12-31 NOTE — LETTER
[facility] A department of [facility]  Phone: [phone]  Fax: [phone]    GORAN Beaulieu CNP        December 31, 2021     Patient: Nemesio Don   YOB: 1980   Date of Visit: 12/31/2021       To Whom It May Concern: It is my medical opinion that Leopold Pack may return to work on 1/7/21 as long as he is fever free for at least 24 hours without use of medication and has marked symptom improvement. If you have any questions or concerns, please don't hesitate to call.     Sincerely,        GORAN Beaulieu CNP

## 2021-12-31 NOTE — PROGRESS NOTES
migraine     Hyperlipidemia     Hypertension     Intractable migraine with aura without status migrainosus 8/22/2017    Kidney stones     Lumbar disc disease     Neuropathy     Right sided weakness     Snores     no cpap       SURGICAL HISTORY     Patient  has a past surgical history that includes Lithotripsy; Inguinal hernia repair (Right); lumbar laminectomy (10/31/2018); and lumbar laminectomy (06/2021). CURRENT MEDICATIONS       Outpatient Medications Prior to Visit   Medication Sig Dispense Refill    cyclobenzaprine (FLEXERIL) 10 MG tablet take 1 tablet by mouth twice a day if needed for muscle spasm      venlafaxine (EFFEXOR XR) 150 MG extended release capsule Take 1 capsule by mouth daily 90 capsule 2    carvedilol (COREG) 3.125 MG tablet take 1 tablet by mouth twice a day with meals 180 tablet 1    hydroCHLOROthiazide (HYDRODIURIL) 12.5 MG tablet take 1 tablet by mouth once daily 90 tablet 1    vitamin D (ERGOCALCIFEROL) 1.25 MG (66828 UT) CAPS capsule Take 1 capsule by mouth once a week 4 capsule 2    ibuprofen (ADVIL;MOTRIN) 600 MG tablet take 1 tablet by mouth every 6 hours if needed for pain (Patient not taking: Reported on 12/31/2021)      Blood Pressure KIT 1 Units by Does not apply route 2 times daily 1 kit 0     No facility-administered medications prior to visit. ALLERGIES     Patient is has No Known Allergies. FAMILY HISTORY     Patient's family history includes Alzheimer's Disease in his maternal grandmother; High Blood Pressure in his father. SOCIAL HISTORY     Patient  reports that he has never smoked. His smokeless tobacco use includes chew. He reports previous alcohol use. He reports that he does not use drugs. PHYSICAL EXAM     VITALS  BP: 120/68, Temp: 96.6 °F (35.9 °C), Pulse: 76,  , SpO2: 98 %  Physical Exam  Vitals reviewed. Constitutional:       General: He is not in acute distress.   HENT:      Right Ear: Tympanic membrane and ear canal normal. Left Ear: Tympanic membrane and ear canal normal.      Nose: Congestion and rhinorrhea present. Rhinorrhea is clear. Mouth/Throat:      Lips: Pink. Mouth: Mucous membranes are moist.      Pharynx: Oropharynx is clear. Uvula midline. No oropharyngeal exudate or posterior oropharyngeal erythema. Tonsils: No tonsillar exudate. Cardiovascular:      Rate and Rhythm: Normal rate and regular rhythm. Heart sounds: Normal heart sounds, S1 normal and S2 normal. No murmur heard. Pulmonary:      Effort: Pulmonary effort is normal. No accessory muscle usage or respiratory distress. Breath sounds: Normal breath sounds. No wheezing or rhonchi. Musculoskeletal:      Cervical back: Normal range of motion and neck supple. Lymphadenopathy:      Cervical: No cervical adenopathy. Skin:     General: Skin is warm and dry. Capillary Refill: Capillary refill takes less than 2 seconds. Neurological:      General: No focal deficit present. Mental Status: He is alert. DIAGNOSTIC RESULTS   Labs:No results found for this visit on 12/31/21. IMAGING:        CLINICAL COURSE:     Vitals:    12/31/21 1128   BP: 120/68   Site: Right Upper Arm   Position: Sitting   Cuff Size: Large Adult   Pulse: 76   Temp: 96.6 °F (35.9 °C)   TempSrc: Tympanic   SpO2: 98%   Weight: (!) 438 lb (198.7 kg)   Height: 6' 4\" (1.93 m)           PROCEDURES:  None  FINAL IMPRESSION      1. COVID-19 virus infection    2. Cough    3. Congestion of nasal sinus    4. Fatigue, unspecified type    5. Person under investigation for COVID-19         DISPOSITION/PLAN     Covid testing collected and quarantine guidelines reviewed; will notify as soon as results are available. Discussed supportive measures for symptom relief and educated pt on s/s that warrant return to clinic.  Pt given info regarding regeneron infusion to review at home; discussed with pt that he would be a candidate for this, henok be able to get infusion at [facility] but recommend calling to check availability. Encouraged to return to clinic should symptoms worsen or any concerns arise. The use, risks, benefits, and potential side effects of prescribed and/or recommended medications were discussed. All questions were answered and the patient/caregiver voiced understanding. Patient Instructions     Will notify you of covid test result as soon as available. You should isolate at home in an area away from family. If you must be around family members, please wear a mask. Quarantine at home until result is available. This means do not go to work/school, attend family gatherings, or invite others to your home until you know your test results. A work/school note will be provided for you. Symptoms appear viral; no antibiotic warranted at this time. The following are measures you can try at home to help provide symptom relief:    --Increase your water intake to help thin secretions and maintain hydration. --May try mucinex (guaifenesin) to help with congestion and robitussin (dextromethorphan) for persistent cough. Use cool mist humidifier at bedtime to moisturize air. Use nasal saline rinse as needed for congestion. --Tylenol or ibuprofen for fever/body aches/headache. Warm/cold packs to forehead and back of neck can help with headache pain. Warm baths/showers can sooth body aches also. Practice good hand hygiene and cover your cough and sneeze to prevent passing virus on. If symptoms worsen or fail to improve, please return to clinic. If you develop severe worsening of symptoms such as chest pain or difficulty breathing, please go to ER. Patient Education        Learning About Coronavirus [phone])  What is coronavirus (COVID-19)? COVID-19 is a disease caused by a type of coronavirus. This illness was first found in December 2019. It has since spread worldwide. Coronaviruses are a large group of viruses.  They cause the common cold. They also cause more serious illnesses like [place] respiratory syndrome (MERS) and severe acute respiratory syndrome (SARS). COVID-19 is caused by a novel coronavirus. That means it's a new type that has not been seen in people before. What are the symptoms? COVID-19 symptoms may include:  · Fever. · Cough. · Trouble breathing. · Chills or repeated shaking with chills. · Muscle and body aches. · Headache. · Sore throat. · New loss of taste or smell. · Vomiting. · Diarrhea. In severe cases, COVID-19 can cause pneumonia and make it hard to breathe without help from a machine. It can cause death. How is it diagnosed? COVID-19 is diagnosed with a viral test. This may also be called a PCR test or antigen test. It looks for evidence of the virus in your breathing passages or lungs (respiratory system). The test is most often done on a sample from the nose, throat, or lungs. It's sometimes done on a sample of saliva. One way a sample is collected is by putting a long swab into the back of your nose. How is it treated? Mild cases of COVID-19 can be treated at home. Serious cases need treatment in the hospital. Treatment may include medicines to reduce symptoms, plus breathing support such as oxygen therapy or a ventilator. Some people may be placed on their belly to help their oxygen levels. Treatments that may help people who have COVID-19 include:  Antiviral medicines. These medicines treat viral infections. Remdesivir is an example. Immune-based therapy. These medicines help the immune system fight COVID-19. Examples include monoclonal antibodies. Blood thinners. These medicines help prevent blood clots. People with severe illness are at risk for blood clots. How can you protect yourself and others? · Get vaccinated. · Avoid sick people. · Cover your mouth with a tissue when you cough or sneeze. · Wash your hands often, especially after you cough or sneeze.  Use soap and water, and scrub for at least 20 seconds. If soap and water aren't available, use an alcohol-based hand . · Avoid touching your mouth, nose, and eyes. Be sure to follow all instructions from the [facility] and your local health authorities. Here are some examples of specific precautions you may need to take. · If you are not fully vaccinated:  ? Wear a mask if you have to go to public areas. ? Avoid crowds and try to stay at least 6 feet away from other people. · If you have been exposed to the virus and are not fully vaccinated:  ? Stay home. Don't go to school, work, or public areas. And don't use public transportation, ride-shares, or taxis unless you have no choice. ? Wear a mask if you have to go to public areas, like the pharmacy. · Even if you're fully vaccinated, there's still a small chance you can get and spread COVID-19. If you live in an area where COVID-19 is spreading quickly, wear a mask if you have to go to indoor public areas. You might also want to wear a mask in crowded outdoor areas if you:  ? Have certain health conditions. ? Live with someone who has a compromised immune system. ? Live with someone who is not fully vaccinated. · If you have been exposed and you are fully vaccinated:  ? Talk to your doctor. You may need a COVID-19 test.  ? Wear a mask in public indoor spaces for 14 days or until you test negative for COVID-19. If you're sick:  · Leave your home only if you need to get medical care. But call the doctor's office first so they know you're coming. And wear a mask. · Wear a mask whenever you're around other people. · Limit contact with pets and people in your home. If possible, stay in a separate bedroom and use a separate bathroom. · Clean and disinfect your home every day. Use household  and disinfectant wipes or sprays. Take special care to clean things that you touch with your hands. How can you self-isolate when you have COVID-19?   If you have COVID-19, there are things you can do to help avoid spreading the virus to others. · Limit contact with people in your home. If possible, stay in a separate bedroom and use a separate bathroom. · Wear a mask when you are around other people. · If you have to leave home, avoid crowds and try to stay at least 6 feet away from other people. · Avoid contact with pets and other animals. · Cover your mouth and nose with a tissue when you cough or sneeze. Then throw it in the trash right away. · Wash your hands often, especially after you cough or sneeze. Use soap and water, and scrub for at least 20 seconds. If soap and water aren't available, use an alcohol-based hand . · Don't share personal household items. These include bedding, towels, cups and glasses, and eating utensils. · [address] in the warmest water allowed for the fabric type, and dry it completely. It's okay to wash other people's laundry with yours. · Clean and disinfect your home. Use household  and disinfectant wipes or sprays. When should you call for help? Call 911 anytime you think you may need emergency care. For example, call if you have life-threatening symptoms, such as:    · You have severe trouble breathing. (You can't talk at all.)     · You have constant chest pain or pressure.     · You are severely dizzy or lightheaded.     · You are confused or can't think clearly.     · You have pale, gray, or blue-colored skin or lips.     · You pass out (lose consciousness) or are very hard to wake up. Call your doctor now or seek immediate medical care if:    · You have moderate trouble breathing. (You can't speak a full sentence.)     · You are coughing up blood (more than about 1 teaspoon).     · You have signs of low blood pressure. These include feeling lightheaded; being too weak to stand; and having cold, pale, clammy skin. Watch closely for changes in your health, and be sure to contact your doctor if:    · Your symptoms get worse.   · You are not getting better as expected.     · You have new or worse symptoms of anxiety, depression, nightmares, or flashbacks. Call before you go to the doctor's office. Follow their instructions. And wear a mask. Current as of: July 1, 2021               Content Version: 13.1  © 2006-2021 Healthwise, [employer]. Care instructions adapted under license by [facility] ([facility]). If you have questions about a medical condition or this instruction, always ask your healthcare professional. [place] [address] any warranty or liability for your use of this information. Patient Education        10 Things to Do When You Have COVID-19      Stay home. Don't go to school, work, or public areas. And don't use public transportation, ride-shares, or taxis unless you have no choice. Leave your home only if you need to get medical care. But call the doctor's office first so they know you're coming. And wear a mask. Ask before leaving isolation. Follow your doctor's advice about when it is safe for you to leave isolation. Wear a mask when you are around other people. It can help stop the spread of the virus. Limit contact with people in your home. If possible, stay in a separate bedroom and use a separate bathroom. Avoid contact with pets and other animals. If possible, have a friend or family member care for them while you're sick. Cover your mouth and nose with a tissue when you cough or sneeze. Then throw the tissue in the trash right away. Wash your hands often, especially after you cough or sneeze. Use soap and water, and scrub for at least 20 seconds. If soap and water aren't available, use an alcohol-based hand . Don't share personal household items. These include bedding, towels, cups and glasses, and eating utensils. Clean and disinfect your home every day. Use household  or disinfectant wipes or sprays.      If needed, take acetaminophen (Tylenol) or ibuprofen (Advil, Motrin) to relieve fever and body aches. Read and follow all instructions on the label. Current as of: March 26, 2021               Content Version: 13.1  © 2006-2021 Healthwise[employer]. Care instructions adapted under license by [facility] ([facility]). If you have questions about a medical condition or this instruction, always ask your healthcare professional. [place] [address] any warranty or liability for your use of this information. Orders Placed This Encounter   Procedures    COVID-19     Standing Status:   Future     Standing Expiration Date:   1/31/2022     Scheduling Instructions:      1) Due to current limited availability of the COVID-19 test, tests will be prioritized based on responses to questions above. Testing may be delayed due to volume. 2) Print and instruct patient to adhere to CDC home isolation program. (Link Above)              3) Set up or refer patient for a monitoring program.              4) Have patient sign up for and leverage MyChart (if not previously done). Order Specific Question:   Is this test for diagnosis or screening? Answer:   Diagnosis of ill patient     Order Specific Question:   Symptomatic for COVID-19 as defined by CDC? Answer:   Yes     Order Specific Question:   Date of Symptom Onset     Answer:   12/28/2021     Order Specific Question:   Hospitalized for COVID-19? Answer:   No     Order Specific Question:   Admitted to ICU for COVID-19? Answer:   No     Order Specific Question:   Employed in healthcare setting? Answer:   No     Order Specific Question:   Resident in a congregate (group) care setting? Answer:   No     Order Specific Question:   Pregnant: Answer:   No     Order Specific Question:   Previously tested for COVID-19?      Answer:   No     Outpatient Encounter Medications as of 12/31/2021   Medication Sig Dispense Refill    cyclobenzaprine (FLEXERIL) 10 MG tablet take 1 tablet by mouth twice a day if needed for muscle spasm      venlafaxine (EFFEXOR XR) 150 MG extended release capsule Take 1 capsule by mouth daily 90 capsule 2    carvedilol (COREG) 3.125 MG tablet take 1 tablet by mouth twice a day with meals 180 tablet 1    hydroCHLOROthiazide (HYDRODIURIL) 12.5 MG tablet take 1 tablet by mouth once daily 90 tablet 1    vitamin D (ERGOCALCIFEROL) 1.25 MG (33275 UT) CAPS capsule Take 1 capsule by mouth once a week 4 capsule 2    ibuprofen (ADVIL;MOTRIN) 600 MG tablet take 1 tablet by mouth every 6 hours if needed for pain (Patient not taking: Reported on 12/31/2021)      Blood Pressure KIT 1 Units by Does not apply route 2 times daily 1 kit 0     No facility-administered encounter medications on file as of 12/31/2021. Return if symptoms worsen or fail to improve.                 Electronically signed by GORAN Ferraro CNP on 12/31/2021 at 11:53 AM

## 2022-01-02 LAB
SARS-COV-2: ABNORMAL
SARS-COV-2: DETECTED
SOURCE: ABNORMAL

## 2022-02-28 ENCOUNTER — TELEPHONE (OUTPATIENT)
Dept: FAMILY MEDICINE CLINIC | Age: 42
End: 2022-02-28

## 2022-02-28 NOTE — TELEPHONE ENCOUNTER
[facility] Trans[facility] Initial Follow Up Call    Outreach made within 2 business days of discharge: Yes    Patient: Audie Nelson Patient : 1980   MRN: [id number]  Reason for Admission: There are no discharge diagnoses documented for the most recent discharge. Discharge Date: 20       Spoke with: Karlee Thao    Discharge department/facility: [facility] TCM Interactive Patient Contact:  Was patient able to fill all prescriptions: Yes  Was patient instructed to bring all medications to the follow-up visit: Yes  Is patient taking all medications as directed in the discharge summary?  Yes  Does patient understand their discharge instructions: Yes  Does patient have questions or concerns that need addressed prior to 7-14 day follow up office visit: yes - needs a knee brace    Scheduled appointment with PCP within 7-14 days    Follow Up  Future Appointments   Date Time Provider Edvin Salgado   3/3/2022 12:00 PM Terrie Henley [address]

## 2022-02-28 NOTE — TELEPHONE ENCOUNTER
Was at [facility] and right leg was recommended to have a knee stabilizer but does not know if needs a prescription advised to seek assistance from [facility] and then contact office with detailed order information

## 2022-03-03 ENCOUNTER — OFFICE VISIT (OUTPATIENT)
Dept: FAMILY MEDICINE CLINIC | Age: 42
End: 2022-03-03
Payer: COMMERCIAL

## 2022-03-03 VITALS
BODY MASS INDEX: 52.55 KG/M2 | SYSTOLIC BLOOD PRESSURE: 134 MMHG | OXYGEN SATURATION: 96 % | HEART RATE: 97 BPM | WEIGHT: 315 LBS | DIASTOLIC BLOOD PRESSURE: 80 MMHG

## 2022-03-03 DIAGNOSIS — M54.50 ACUTE EXACERBATION OF CHRONIC LOW BACK PAIN: ICD-10-CM

## 2022-03-03 DIAGNOSIS — E66.8 EXTREME OBESITY: ICD-10-CM

## 2022-03-03 DIAGNOSIS — G89.29 ACUTE EXACERBATION OF CHRONIC LOW BACK PAIN: ICD-10-CM

## 2022-03-03 DIAGNOSIS — I10 ESSENTIAL HYPERTENSION: ICD-10-CM

## 2022-03-03 DIAGNOSIS — M54.42 ACUTE LEFT-SIDED LOW BACK PAIN WITH LEFT-SIDED SCIATICA: Primary | ICD-10-CM

## 2022-03-03 DIAGNOSIS — R29.898 WEAKNESS OF LEFT LEG: ICD-10-CM

## 2022-03-03 DIAGNOSIS — F41.1 ANXIETY, GENERALIZED: ICD-10-CM

## 2022-03-03 PROCEDURE — G8484 FLU IMMUNIZE NO ADMIN: HCPCS | Performed by: NURSE PRACTITIONER

## 2022-03-03 PROCEDURE — 4004F PT TOBACCO SCREEN RCVD TLK: CPT | Performed by: NURSE PRACTITIONER

## 2022-03-03 PROCEDURE — G8417 CALC BMI ABV UP PARAM F/U: HCPCS | Performed by: NURSE PRACTITIONER

## 2022-03-03 PROCEDURE — 99212 OFFICE O/P EST SF 10 MIN: CPT

## 2022-03-03 PROCEDURE — 99213 OFFICE O/P EST LOW 20 MIN: CPT | Performed by: NURSE PRACTITIONER

## 2022-03-03 PROCEDURE — G8427 DOCREV CUR MEDS BY ELIG CLIN: HCPCS | Performed by: NURSE PRACTITIONER

## 2022-03-03 RX ORDER — PREDNISONE 20 MG/1
TABLET ORAL
COMMUNITY
Start: 2022-02-27 | End: 2022-08-03

## 2022-03-03 RX ORDER — OXYCODONE HYDROCHLORIDE AND ACETAMINOPHEN 5; 325 MG/1; MG/1
TABLET ORAL
COMMUNITY
Start: 2022-02-27 | End: 2022-08-03

## 2022-03-03 NOTE — PROGRESS NOTES
[address] [place] [address] [address]  Dept: [phone]  Dept Fax: [phone]    History and Physical  Patient:  Simran Elder  YOB: 1980  Date of Service:  3/3/2022    Subjective:   Simran Elder (:  1980) is a 40 y.o. male, Established patient, here for evaluation of the following chief complaint(s):    Chief Complaint   Patient presents with    Follow-Up from BRADLEY BERMEO     was recently at [facility] for back issues, has been referred to neurosurgery but awaiting on approval. also was back in [facility] ER and was told nerve damage was irreversible and has been taking percocet around the clock to alleviate pain still having some incontinence and motor skills in left leg are gone     Patient reports losing function to lower extremities last Friday while helping to lift a pipe. He was evaluated at [facility], and ultimately discharged with oral steroid and percocet. He was advised to follow up with his neurosurgeon as outpatient. Patient reports taking percocet and still having uncontrolled pain. He has been trying to schedule a follow up appointment with Dr. Reola Sandifer (neurosurgery). He reports leaving 4 messages and has not received a return call. He is requesting help in getting an appointment.        The ASCVD Risk score (Hali Miles., et al., 2013) failed to calculate for the following reasons:    Cannot find a previous HDL lab    Cannot find a previous total cholesterol lab     BP Readings from Last 3 Encounters:   22 134/80   21 120/68   21 130/74      Pulse Readings from Last 3 Encounters:   22 97   21 76   21 76      Wt Readings from Last 3 Encounters:   22 (!) 431 lb 11.2 oz (195.8 kg)   21 (!) 438 lb (198.7 kg)   21 (!) 456 lb 12.8 oz (207.2 kg)        No Known Allergies    Current Outpatient Medications   Medication Sig Dispense Refill    Acid reflux    Cholecystitis    Glaucoma oxyCODONE-acetaminophen (PERCOCET) 5-325 MG per tablet take 1 tablet by mouth every 4 hours AS NEEDED FOR PAIN      predniSONE (DELTASONE) 20 MG tablet take 1 tablet by mouth twice a day before meals      cyclobenzaprine (FLEXERIL) 10 MG tablet take 1 tablet by mouth twice a day if needed for muscle spasm      venlafaxine (EFFEXOR XR) 150 MG extended release capsule Take 1 capsule by mouth daily 90 capsule 2    carvedilol (COREG) 3.125 MG tablet take 1 tablet by mouth twice a day with meals 180 tablet 1    hydroCHLOROthiazide (HYDRODIURIL) 12.5 MG tablet take 1 tablet by mouth once daily 90 tablet 1    vitamin D (ERGOCALCIFEROL) 1.25 MG (67436 UT) CAPS capsule Take 1 capsule by mouth once a week 4 capsule 2    Blood Pressure KIT 1 Units by Does not apply route 2 times daily 1 kit 0    ibuprofen (ADVIL;MOTRIN) 600 MG tablet take 1 tablet by mouth every 6 hours if needed for pain (Patient not taking: Reported on 3/3/2022)       No current facility-administered medications for this visit.         Past Medical History:   Diagnosis Date    Anxiety     Chronic lumbar pain     Chronic neck pain     Depression     including hospitalization    Extreme obesity     Headache     History of migraine     Hyperlipidemia     Hypertension     Intractable migraine with aura without status migrainosus 8/22/2017    Kidney stones     Lumbar disc disease     Neuropathy     Right sided weakness     Snores     no cpap       Past Surgical History:   Procedure Laterality Date    INGUINAL HERNIA REPAIR Right     LITHOTRIPSY      LUMBAR LAMINECTOMY  10/31/2018    LUMBAR LAMINECTOMY  06/2021    Dr. Emmanuel Lopez     Family History   Problem Relation Age of Onset    High Blood Pressure Father     Alzheimer's Disease Maternal Grandmother      Social History     Tobacco Use    Smoking status: Never Smoker    Smokeless tobacco: Current User     Types: Chew   Vaping Use    Vaping Use: Never used   Substance Use Topics    Alcohol use: Not Currently     Alcohol/week: 0.0 standard drinks     Comment: occasionally    Drug use: No       Review of Systems:     Review of Systems   HENT: Negative. Respiratory: Negative. Cardiovascular: Negative for chest pain. Genitourinary:        Incotinence   Musculoskeletal: Positive for back pain. Neurological: Positive for weakness (bilateral leg). Psychiatric/Behavioral: Positive for agitation, decreased concentration, dysphoric mood and sleep disturbance. The patient is nervous/anxious. Physical Exam:     Vitals:    03/03/22 1201   BP: 134/80   Pulse: 97   SpO2: 96%   Weight: (!) 431 lb 11.2 oz (195.8 kg)      Body mass index is 52.55 kg/m². Physical Exam  Constitutional:       Appearance: He is obese. He is ill-appearing (appears uncomfortable, has difficulty finding a position without pain). HENT:      Head: Normocephalic. Eyes:      Conjunctiva/sclera: Conjunctivae normal.   Cardiovascular:      Rate and Rhythm: Normal rate and regular rhythm. Heart sounds: Normal heart sounds. Pulmonary:      Effort: Pulmonary effort is normal.      Breath sounds: Normal breath sounds. No wheezing. Musculoskeletal:      Cervical back: Neck supple. Neurological:      Mental Status: He is alert and oriented to person, place, and time. Motor: Weakness (LLE) present. Gait: Gait abnormal (antalgic). Psychiatric:         Mood and Affect: Mood is anxious and depressed. Affect is tearful. Assessment/Plan:   1. Acute left-sided low back pain with left-sided sciatica  2. Acute exacerbation of chronic low back pain  3. Weakness of left leg  4. Extreme obesity  5. Anxiety, generalized  6. Essential hypertension     Instructed to continue the steroid and percocet as prescribed. Recommend using ice to the lower back for 20 minutes at a time, then off for 1 hour. Called Dr. Mehrdad Vigil office to request appointment for patient.  Dr. John Delatorre is out today, but states they will call patient to schedule for Monday when he returns. Patient notified and was thankful for the help. Return if symptoms worsen or fail to improve. Please note that this chart was generated using voice recognition Dragon dictation software. Although every effort was made to ensure the accuracy of this automated transcription, some errors in transcription may have occurred.     Electronically signed by GORAN Echavarria CNP on 3/12/2022

## 2022-03-12 PROBLEM — M54.42 ACUTE LEFT-SIDED LOW BACK PAIN WITH LEFT-SIDED SCIATICA: Status: ACTIVE | Noted: 2021-04-29

## 2022-03-12 ASSESSMENT — ENCOUNTER SYMPTOMS
BACK PAIN: 1
RESPIRATORY NEGATIVE: 1

## 2022-03-24 ENCOUNTER — TELEPHONE (OUTPATIENT)
Dept: FAMILY MEDICINE CLINIC | Age: 42
End: 2022-03-24

## 2022-03-24 NOTE — TELEPHONE ENCOUNTER
Pt calling requesting workmans comp to be completed and he was going to call workmans comp to see what they can do and have info faxed to us

## 2022-03-24 NOTE — TELEPHONE ENCOUNTER
Patient called and asked that his last office note be faxed to [phone] with the claim number of [id number]. Signed OV form 3/3/2022 faxed at this time.

## 2022-04-23 ENCOUNTER — TELEPHONE (OUTPATIENT)
Dept: FAMILY MEDICINE CLINIC | Age: 42
End: 2022-04-23

## 2022-04-23 DIAGNOSIS — I10 ESSENTIAL HYPERTENSION: ICD-10-CM

## 2022-04-25 RX ORDER — HYDROCHLOROTHIAZIDE 12.5 MG/1
TABLET ORAL
Qty: 90 TABLET | Refills: 0 | Status: SHIPPED | OUTPATIENT
Start: 2022-04-25 | End: 2022-08-22

## 2022-04-25 RX ORDER — CARVEDILOL 3.12 MG/1
TABLET ORAL
Qty: 180 TABLET | Refills: 0 | Status: SHIPPED | OUTPATIENT
Start: 2022-04-25 | End: 2022-05-04 | Stop reason: SDUPTHER

## 2022-04-25 NOTE — TELEPHONE ENCOUNTER
Omaira Whalen is requesting a refill on the following medication(s):  Requested Prescriptions     Pending Prescriptions Disp Refills    hydroCHLOROthiazide (HYDRODIURIL) 12.5 MG tablet [Pharmacy Med Name: HYDROCHLOROTHIAZIDE 12.5 MG TB] 90 tablet 1     Sig: take 1 tablet by mouth once daily    carvedilol (COREG) 3.125 MG tablet [Pharmacy Med Name: CARVEDILOL 3.125 MG TABLET] 180 tablet 1     Sig: take 1 tablet by mouth twice a day with meals       Last Visit Date (If Applicable):  3/9/1949    Next Visit Date:    Visit date not found

## 2022-05-04 ENCOUNTER — OFFICE VISIT (OUTPATIENT)
Dept: FAMILY MEDICINE CLINIC | Age: 42
End: 2022-05-04
Payer: COMMERCIAL

## 2022-05-04 VITALS
BODY MASS INDEX: 51.76 KG/M2 | OXYGEN SATURATION: 95 % | WEIGHT: 315 LBS | HEART RATE: 84 BPM | DIASTOLIC BLOOD PRESSURE: 70 MMHG | SYSTOLIC BLOOD PRESSURE: 138 MMHG

## 2022-05-04 DIAGNOSIS — E66.8 EXTREME OBESITY: ICD-10-CM

## 2022-05-04 DIAGNOSIS — M54.42 ACUTE LEFT-SIDED LOW BACK PAIN WITH LEFT-SIDED SCIATICA: ICD-10-CM

## 2022-05-04 DIAGNOSIS — F33.2 SEVERE EPISODE OF RECURRENT MAJOR DEPRESSIVE DISORDER, WITHOUT PSYCHOTIC FEATURES (HCC): ICD-10-CM

## 2022-05-04 DIAGNOSIS — Z13.1 SCREENING FOR DIABETES MELLITUS: ICD-10-CM

## 2022-05-04 DIAGNOSIS — M62.830 MUSCLE SPASM OF BACK: ICD-10-CM

## 2022-05-04 DIAGNOSIS — I10 ESSENTIAL HYPERTENSION: ICD-10-CM

## 2022-05-04 DIAGNOSIS — E87.5 HYPERKALEMIA: ICD-10-CM

## 2022-05-04 DIAGNOSIS — F33.1 MODERATE EPISODE OF RECURRENT MAJOR DEPRESSIVE DISORDER (HCC): ICD-10-CM

## 2022-05-04 DIAGNOSIS — F41.1 ANXIETY, GENERALIZED: ICD-10-CM

## 2022-05-04 DIAGNOSIS — N52.9 ERECTILE DYSFUNCTION, UNSPECIFIED ERECTILE DYSFUNCTION TYPE: ICD-10-CM

## 2022-05-04 DIAGNOSIS — Z00.00 ENCOUNTER FOR WELLNESS EXAMINATION IN ADULT: Primary | ICD-10-CM

## 2022-05-04 DIAGNOSIS — E78.2 MIXED HYPERLIPIDEMIA: ICD-10-CM

## 2022-05-04 DIAGNOSIS — F17.220 NICOTINE DEPENDENCE, CHEWING TOBACCO, UNCOMPLICATED: ICD-10-CM

## 2022-05-04 DIAGNOSIS — E55.9 VITAMIN D DEFICIENCY: ICD-10-CM

## 2022-05-04 PROCEDURE — 99396 PREV VISIT EST AGE 40-64: CPT | Performed by: NURSE PRACTITIONER

## 2022-05-04 RX ORDER — CYCLOBENZAPRINE HCL 10 MG
TABLET ORAL
Qty: 30 TABLET | Refills: 2 | Status: SHIPPED | OUTPATIENT
Start: 2022-05-04

## 2022-05-04 RX ORDER — VENLAFAXINE HYDROCHLORIDE 150 MG/1
150 CAPSULE, EXTENDED RELEASE ORAL DAILY
Qty: 90 CAPSULE | Refills: 2 | Status: SHIPPED | OUTPATIENT
Start: 2022-05-04

## 2022-05-04 RX ORDER — CARVEDILOL 3.12 MG/1
TABLET ORAL
Qty: 180 TABLET | Refills: 2 | Status: SHIPPED | OUTPATIENT
Start: 2022-05-04

## 2022-05-04 RX ORDER — SILDENAFIL 100 MG/1
100 TABLET, FILM COATED ORAL PRN
Qty: 30 TABLET | Refills: 0 | Status: SHIPPED | OUTPATIENT
Start: 2022-05-04

## 2022-05-04 ASSESSMENT — PATIENT HEALTH QUESTIONNAIRE - PHQ9
1. LITTLE INTEREST OR PLEASURE IN DOING THINGS: 0
SUM OF ALL RESPONSES TO PHQ9 QUESTIONS 1 & 2: 1
SUM OF ALL RESPONSES TO PHQ QUESTIONS 1-9: 2
SUM OF ALL RESPONSES TO PHQ QUESTIONS 1-9: 2
9. THOUGHTS THAT YOU WOULD BE BETTER OFF DEAD, OR OF HURTING YOURSELF: 0
SUM OF ALL RESPONSES TO PHQ QUESTIONS 1-9: 2
8. MOVING OR SPEAKING SO SLOWLY THAT OTHER PEOPLE COULD HAVE NOTICED. OR THE OPPOSITE, BEING SO FIGETY OR RESTLESS THAT YOU HAVE BEEN MOVING AROUND A LOT MORE THAN USUAL: 0
SUM OF ALL RESPONSES TO PHQ QUESTIONS 1-9: 2
5. POOR APPETITE OR OVEREATING: 0
3. TROUBLE FALLING OR STAYING ASLEEP: 0
2. FEELING DOWN, DEPRESSED OR HOPELESS: 1
4. FEELING TIRED OR HAVING LITTLE ENERGY: 0
7. TROUBLE CONCENTRATING ON THINGS, SUCH AS READING THE NEWSPAPER OR WATCHING TELEVISION: 0
6. FEELING BAD ABOUT YOURSELF - OR THAT YOU ARE A FAILURE OR HAVE LET YOURSELF OR YOUR FAMILY DOWN: 1
10. IF YOU CHECKED OFF ANY PROBLEMS, HOW DIFFICULT HAVE THESE PROBLEMS MADE IT FOR YOU TO DO YOUR WORK, TAKE CARE OF THINGS AT HOME, OR GET ALONG WITH OTHER PEOPLE: 0

## 2022-05-04 NOTE — PROGRESS NOTES
[address] [address] [address]  Dept: [phone]  Dept Fax: [phone]    History and Physical  Patient:  Betty Silva  YOB: 1980  Date of Service:  2022    Subjective:   Betty Silva (:  1980) is a 40 y.o. male, Established patient, here for evaluation of the following chief complaint(s):    Chief Complaint   Patient presents with    Hypertension     denies chest pains sob dizziness leg edema    Hyperlipidemia     c/o body stiffness and no feeling from right knee down to foot goes to get ablation done on      Anxiety     denies any racing thoughts appetite changes sleep disturbances    Depression      Neurosurgery (Dr. Kayla Betancourt) recommends a fusion but pt is not agreeable due to recovery and decreased function. He is scheduled to have a bilateral ablation 2022 at [facility] Dr. Bishop Field. Hypertension  This is a chronic problem. The current episode started more than 1 year ago. The problem is controlled. Associated symptoms include anxiety. Pertinent negatives include no chest pain, headaches, malaise/fatigue, orthopnea, palpitations, peripheral edema, PND or shortness of breath. There are no associated agents to hypertension. Risk factors for coronary artery disease include male gender, obesity, family history, dyslipidemia and stress. Past treatments include diuretics and angiotensin blockers. The current treatment provides moderate improvement. There are no compliance problems. Erectile Dysfunction  This is a recurrent problem. The current episode started 1 to 4 weeks ago (2 weeks). The problem is unchanged. The nature of his difficulty is maintaining erection. Non-physiologic factors contributing to erectile dysfunction are anxiety (controlled). Irritative symptoms do not include frequency, nocturia or urgency.  Pertinent negatives include no chills, dysuria, genital pain, hematuria, hesitancy or inability to urinate. Nothing aggravates the symptoms. Past treatments include nothing. Risk factors include hypertension.        The 10-year ASCVD risk score (Inder Fuller, et al., 2013) is: 2.6%    Values used to calculate the score:      Age: 40 years      Sex: Male      Is Non- : No      Diabetic: No      Tobacco smoker: No      Systolic Blood Pressure: 885 mmHg      Is BP treated: Yes      HDL Cholesterol: 31 mg/dL      Total Cholesterol: 177 mg/dL     BP Readings from Last 3 Encounters:   05/04/22 138/70   03/03/22 134/80   12/31/21 120/68      Pulse Readings from Last 3 Encounters:   05/04/22 84   03/03/22 97   12/31/21 76      Wt Readings from Last 3 Encounters:   05/04/22 (!) 425 lb 3.2 oz (192.9 kg)   03/03/22 (!) 431 lb 11.2 oz (195.8 kg)   12/31/21 (!) 438 lb (198.7 kg)        No Known Allergies    Current Outpatient Medications   Medication Sig Dispense Refill    sildenafil (VIAGRA) 100 MG tablet Take 1 tablet by mouth as needed for Erectile Dysfunction 30 tablet 0    venlafaxine (EFFEXOR XR) 150 MG extended release capsule Take 1 capsule by mouth daily 90 capsule 2    cyclobenzaprine (FLEXERIL) 10 MG tablet take 1 tablet by mouth twice a day if needed for muscle spasm 30 tablet 2    carvedilol (COREG) 3.125 MG tablet take 1 tablet by mouth twice a day with meals 180 tablet 2    hydroCHLOROthiazide (HYDRODIURIL) 12.5 MG tablet take 1 tablet by mouth once daily 90 tablet 0    vitamin D (ERGOCALCIFEROL) 1.25 MG (34834 UT) CAPS capsule Take 1 capsule by mouth once a week 4 capsule 2    Blood Pressure KIT 1 Units by Does not apply route 2 times daily 1 kit 0    oxyCODONE-acetaminophen (PERCOCET) 5-325 MG per tablet take 1 tablet by mouth every 4 hours AS NEEDED FOR PAIN (Patient not taking: Reported on 5/4/2022)      predniSONE (DELTASONE) 20 MG tablet take 1 tablet by mouth twice a day before meals (Patient not taking: Reported on 5/4/2022)      ibuprofen (ADVIL;MOTRIN) 600 MG tablet take 1 tablet by mouth every 6 hours if needed for pain (Patient not taking: Reported on 3/3/2022)       No current facility-administered medications for this visit. Past Medical History:   Diagnosis Date    Anxiety     Chronic lumbar pain     Chronic neck pain     Depression     including hospitalization    Extreme obesity     Headache     History of migraine     Hyperlipidemia     Hypertension     Intractable migraine with aura without status migrainosus 8/22/2017    Kidney stones     Lumbar disc disease     Neuropathy     Right sided weakness     Snores     no cpap       Past Surgical History:   Procedure Laterality Date    INGUINAL HERNIA REPAIR Right     LITHOTRIPSY      LUMBAR LAMINECTOMY  10/31/2018    LUMBAR LAMINECTOMY  06/2021    Dr. Zita Homans     Family History   Problem Relation Age of Onset    High Blood Pressure Father     Alzheimer's Disease Maternal Grandmother      Social History     Tobacco Use    Smoking status: Never Smoker    Smokeless tobacco: Current User     Types: Chew   Vaping Use    Vaping Use: Never used   Substance Use Topics    Alcohol use: Not Currently     Alcohol/week: 0.0 standard drinks     Comment: occasionally    Drug use: No       Review of Systems:     Review of Systems   Constitutional: Negative for appetite change, chills, fatigue, fever and malaise/fatigue. HENT: Negative. Eyes: Negative. Respiratory: Negative for cough, shortness of breath and wheezing. Cardiovascular: Negative for chest pain, palpitations, orthopnea, leg swelling and PND. Gastrointestinal: Negative for abdominal pain, constipation and diarrhea. Endocrine: Negative for cold intolerance, heat intolerance, polydipsia, polyphagia and polyuria. Genitourinary: Negative. Negative for dysuria, frequency, hematuria, hesitancy, nocturia and urgency. ED   Musculoskeletal: Positive for back pain (chronic lower back).  Negative for arthralgias and myalgias. Allergic/Immunologic: Negative for environmental allergies and food allergies. Neurological: Negative for dizziness, weakness, light-headedness and headaches. Psychiatric/Behavioral: Positive for dysphoric mood (controlled). Negative for agitation, self-injury, sleep disturbance and suicidal ideas. The patient is nervous/anxious (controlled). PHQ Scores 5/4/2022 12/31/2021 6/10/2020 10/11/2018 11/27/2017   PHQ2 Score 1 0 1 0 6   PHQ9 Score 2 0 1 0 18     Interpretation of Total Score Depression Severity: 1-4 = Minimal depression, 5-9 = Mild depression, 10-14 = Moderate depression, 15-19 = Moderately severe depression, 20-27 = Severe depression     Physical Exam:     Vitals:    05/04/22 0956   BP: 138/70   Pulse: 84   SpO2: 95%   Weight: (!) 425 lb 3.2 oz (192.9 kg)      Body mass index is 51.76 kg/m². Physical Exam  Constitutional:       Appearance: Normal appearance. He is well-developed and well-groomed. He is obese. HENT:      Head: Normocephalic. Eyes:      Conjunctiva/sclera: Conjunctivae normal.   Neck:      Thyroid: No thyromegaly. Vascular: No carotid bruit. Cardiovascular:      Rate and Rhythm: Normal rate and regular rhythm. Heart sounds: Normal heart sounds. Pulmonary:      Effort: Pulmonary effort is normal.      Breath sounds: Normal breath sounds. No wheezing. Abdominal:      General: Bowel sounds are normal.      Palpations: Abdomen is soft. Tenderness: There is no abdominal tenderness. Musculoskeletal:      Cervical back: Neck supple. Right lower leg: No edema. Left lower leg: No edema. Lymphadenopathy:      Cervical: No cervical adenopathy. Skin:     Capillary Refill: Capillary refill takes less than 2 seconds. Neurological:      Mental Status: He is alert and oriented to person, place, and time. Psychiatric:         Mood and Affect: Mood normal.         Behavior: Behavior is cooperative.          Assessment/Plan: 1. Encounter for wellness examination in adult  -     Testosterone; Future  -     Potassium  -     Lipid Panel  -     Microalbumin Panel  -     TSH with Reflex to FT4  -     Vitamin D 25 Hydroxy  -     Hemoglobin A1C  -     Testosterone  2. Essential hypertension  -     carvedilol (COREG) 3.125 MG tablet; take 1 tablet by mouth twice a day with meals, Disp-180 tablet, R-2Normal  -     Lipid Panel  -     Microalbumin Panel  3. Mixed hyperlipidemia  -     Lipid Panel  4. Nicotine dependence, chewing tobacco, uncomplicated  5. Severe episode of recurrent major depressive disorder, without psychotic features ([facility] [address]  -     TSH with Reflex to FT4  -     Vitamin D 25 Hydroxy  -     Hemoglobin A1C  6. Vitamin D deficiency  -     Vitamin D 25 Hydroxy  7. Extreme obesity  -     Lipid Panel  -     TSH with Reflex to FT4  -     Vitamin D 25 Hydroxy  -     Hemoglobin A1C  -     Testosterone  8. Anxiety, generalized  -     TSH with Reflex to FT4  -     Vitamin D 25 Hydroxy  -     Hemoglobin A1C  9. Hyperkalemia  -     Potassium  10. Erectile dysfunction, unspecified erectile dysfunction type  -     sildenafil (VIAGRA) 100 MG tablet; Take 1 tablet by mouth as needed for Erectile Dysfunction, Disp-30 tablet, R-0Normal  -     Testosterone; Future  -     Lipid Panel  -     TSH with Reflex to FT4  -     Hemoglobin A1C  -     Testosterone  11. Moderate episode of recurrent major depressive disorder (HCC)  -     venlafaxine (EFFEXOR XR) 150 MG extended release capsule; Take 1 capsule by mouth daily, Disp-90 capsule, R-2Normal  12. Acute left-sided low back pain with left-sided sciatica  -     cyclobenzaprine (FLEXERIL) 10 MG tablet; take 1 tablet by mouth twice a day if needed for muscle spasm, Disp-30 tablet, R-2Normal  13. Muscle spasm of back  -     cyclobenzaprine (FLEXERIL) 10 MG tablet; take 1 tablet by mouth twice a day if needed for muscle spasm, Disp-30 tablet, R-2Normal  14.  Screening for diabetes mellitus  -     Hemoglobin A1C         All patient questions answered. Patient voiced understanding. Instructed to continue current medications. Patient agreed with treatment plan. Follow up as directed. Return if symptoms worsen or fail to improve. Please note that this chart was generated using voice recognition Dragon dictation software. Although every effort was made to ensure the accuracy of this automated transcription, some errors in transcription may have occurred.     Electronically signed by GORAN Alcala CNP on 5/15/2022

## 2022-05-06 LAB
CHOLESTEROL/HDL RATIO: 5.71 RATIO (ref 0–4.5)
CHOLESTEROL: 177 MG/DL (ref 50–200)
CREATININE, RANDOM URINE: 376.9 MG/DL (ref 20–370)
HBA1C MFR BLD: 4.8 % (ref 4.4–6.4)
HDLC SERPL-MCNC: 31 MG/DL (ref 36–68)
LDL CHOLESTEROL CALCULATED: 113.2 MG/DL (ref 0–160)
MICROALBUMIN UR-MCNC: 3.3 MG/DL (ref 0–1.7)
MICROALBUMIN/CREAT UR-RTO: 8.75
POTASSIUM SERPL-SCNC: 3.8 MMOL/L (ref 3.6–5)
TESTOSTERONE TOTAL: 375 NG/DL (ref 220–1000)
TRIGL SERPL-MCNC: 164 MG/DL (ref 10–250)
TSH REFLEX FT4: 0.76 MIU/ML (ref 0.49–4.67)
VITAMIN D 25-HYDROXY: 32.7 NG/ML (ref 30–100)
VLDLC SERPL CALC-MCNC: 33 MG/DL (ref 0–50)

## 2022-05-09 DIAGNOSIS — Z00.00 ENCOUNTER FOR WELLNESS EXAMINATION IN ADULT: ICD-10-CM

## 2022-05-09 DIAGNOSIS — N52.9 ERECTILE DYSFUNCTION, UNSPECIFIED ERECTILE DYSFUNCTION TYPE: ICD-10-CM

## 2022-05-15 PROBLEM — E87.5 HYPERKALEMIA: Status: ACTIVE | Noted: 2022-05-15

## 2022-05-15 PROBLEM — N52.9 ERECTILE DYSFUNCTION: Status: ACTIVE | Noted: 2022-05-15

## 2022-05-15 ASSESSMENT — ENCOUNTER SYMPTOMS
EYES NEGATIVE: 1
CONSTIPATION: 0
ABDOMINAL PAIN: 0
WHEEZING: 0
DIARRHEA: 0
BACK PAIN: 1
ORTHOPNEA: 0
COUGH: 0
SHORTNESS OF BREATH: 0

## 2022-08-20 DIAGNOSIS — I10 ESSENTIAL HYPERTENSION: ICD-10-CM

## 2022-08-22 RX ORDER — HYDROCHLOROTHIAZIDE 12.5 MG/1
TABLET ORAL
Qty: 90 TABLET | Refills: 5 | Status: SHIPPED | OUTPATIENT
Start: 2022-08-22

## 2022-08-22 NOTE — TELEPHONE ENCOUNTER
Jessica Caraballo is calling to request a refill on the following medication(s):  Requested Prescriptions     Pending Prescriptions Disp Refills    hydroCHLOROthiazide (HYDRODIURIL) 12.5 MG tablet [Pharmacy Med Name: HYDROCHLOROTHIAZIDE 12.5 MG TB] 90 tablet 0     Sig: take 1 tablet by mouth once daily       Last Visit Date (If Applicable):  4/2/6485    Next Visit Date:    Visit date not found

## 2023-02-02 ENCOUNTER — HOSPITAL ENCOUNTER (OUTPATIENT)
Age: 43
Setting detail: SPECIMEN
Discharge: HOME | End: 2023-02-02
Payer: COMMERCIAL

## 2023-02-02 ENCOUNTER — OFFICE VISIT (OUTPATIENT)
Dept: FAMILY MEDICINE CLINIC | Age: 43
End: 2023-02-02
Payer: COMMERCIAL

## 2023-02-02 VITALS
BODY MASS INDEX: 53.92 KG/M2 | HEART RATE: 86 BPM | DIASTOLIC BLOOD PRESSURE: 68 MMHG | SYSTOLIC BLOOD PRESSURE: 136 MMHG | OXYGEN SATURATION: 93 % | WEIGHT: 315 LBS

## 2023-02-02 DIAGNOSIS — R39.15 URGENCY OF URINATION: ICD-10-CM

## 2023-02-02 DIAGNOSIS — M25.552 BILATERAL HIP PAIN: ICD-10-CM

## 2023-02-02 DIAGNOSIS — R10.32 GROIN PAIN, LEFT: ICD-10-CM

## 2023-02-02 DIAGNOSIS — M54.41 CHRONIC BILATERAL LOW BACK PAIN WITH RIGHT-SIDED SCIATICA: ICD-10-CM

## 2023-02-02 DIAGNOSIS — M25.551 BILATERAL HIP PAIN: ICD-10-CM

## 2023-02-02 DIAGNOSIS — R35.0 FREQUENCY OF URINATION: ICD-10-CM

## 2023-02-02 DIAGNOSIS — N52.9 ERECTILE DYSFUNCTION, UNSPECIFIED ERECTILE DYSFUNCTION TYPE: ICD-10-CM

## 2023-02-02 DIAGNOSIS — M25.552 BILATERAL HIP PAIN: Primary | ICD-10-CM

## 2023-02-02 DIAGNOSIS — M25.551 BILATERAL HIP PAIN: Primary | ICD-10-CM

## 2023-02-02 DIAGNOSIS — G89.29 CHRONIC BILATERAL LOW BACK PAIN WITH RIGHT-SIDED SCIATICA: ICD-10-CM

## 2023-02-02 LAB
APPEARANCE FLUID: CLEAR
BILIRUBIN, POC: ABNORMAL
BLOOD URINE, POC: ABNORMAL
CLARITY, POC: CLEAR
COLOR, POC: ABNORMAL
GLUCOSE URINE, POC: ABNORMAL
KETONES, POC: ABNORMAL
LEUKOCYTE EST, POC: ABNORMAL
NITRITE, POC: ABNORMAL
PH, POC: 5
PROTEIN, POC: ABNORMAL
SPECIFIC GRAVITY, POC: 1.03
UROBILINOGEN, POC: ABNORMAL

## 2023-02-02 PROCEDURE — 81002 URINALYSIS NONAUTO W/O SCOPE: CPT | Performed by: NURSE PRACTITIONER

## 2023-02-02 PROCEDURE — 99213 OFFICE O/P EST LOW 20 MIN: CPT | Performed by: NURSE PRACTITIONER

## 2023-02-02 PROCEDURE — G8484 FLU IMMUNIZE NO ADMIN: HCPCS | Performed by: NURSE PRACTITIONER

## 2023-02-02 PROCEDURE — 4004F PT TOBACCO SCREEN RCVD TLK: CPT | Performed by: NURSE PRACTITIONER

## 2023-02-02 PROCEDURE — 3078F DIAST BP <80 MM HG: CPT | Performed by: NURSE PRACTITIONER

## 2023-02-02 PROCEDURE — G8417 CALC BMI ABV UP PARAM F/U: HCPCS | Performed by: NURSE PRACTITIONER

## 2023-02-02 PROCEDURE — G8427 DOCREV CUR MEDS BY ELIG CLIN: HCPCS | Performed by: NURSE PRACTITIONER

## 2023-02-02 PROCEDURE — 3074F SYST BP LT 130 MM HG: CPT | Performed by: NURSE PRACTITIONER

## 2023-02-02 PROCEDURE — 87086 URINE CULTURE/COLONY COUNT: CPT

## 2023-02-02 RX ORDER — MELOXICAM 15 MG/1
15 TABLET ORAL DAILY PRN
Qty: 30 TABLET | Refills: 0 | Status: SHIPPED | OUTPATIENT
Start: 2023-02-02

## 2023-02-02 RX ORDER — SILDENAFIL 100 MG/1
100 TABLET, FILM COATED ORAL PRN
Qty: 30 TABLET | Refills: 0 | Status: SHIPPED | OUTPATIENT
Start: 2023-02-02

## 2023-02-02 ASSESSMENT — ENCOUNTER SYMPTOMS: BACK PAIN: 1

## 2023-02-02 NOTE — PROGRESS NOTES
[address] [address] [address]  Dept: [phone]  Dept Fax: [phone]    Date of Service:  2023    Keerthi Ferrara (:  1980) is a 40 y.o. male, here for evaluation of the following chief complaint(s):    Chief Complaint   Patient presents with    Hip Pain     Has been dealing with hip pain for some time and is causing some incontinence issues. Difficult to go upstairs gets sharp pains in groin      Assessment/Plan:   1. Bilateral hip pain  -     XR HIP BILATERAL W AP PELVIS (2 VIEWS); Future  -     meloxicam (MOBIC) 15 MG tablet; Take 1 tablet by mouth daily as needed for Pain, Disp-30 tablet, R-0Normal  2. Groin pain, left  -     POCT Urinalysis no Micro  -     XR HIP BILATERAL W AP PELVIS (2 VIEWS); Future  3. Chronic bilateral low back pain with right-sided sciatica  -     XR HIP BILATERAL W AP PELVIS (2 VIEWS); Future  -     meloxicam (MOBIC) 15 MG tablet; Take 1 tablet by mouth daily as needed for Pain, Disp-30 tablet, R-0Normal  4. Frequency of urination  -     POCT Urinalysis no Micro  -     Culture, Urine; Future  5. Urgency of urination  -     POCT Urinalysis no Micro  -     Culture, Urine; Future  6. Erectile dysfunction, unspecified erectile dysfunction type  -     sildenafil (VIAGRA) 100 MG tablet; Take 1 tablet by mouth as needed for Erectile Dysfunction, Disp-30 tablet, R-0Normal     Will send urine for culture and treat as appropriate. CT abdomen to r/o kidney stone. All patient questions answered. Patient voiced understanding. Instructed to continue current medications. Patient agreed with treatment plan. Follow up as directed. Return if symptoms worsen or fail to improve. Subjective:   Presents complaining of lower abdominal and bilateral hip pressure. Pain radiates to bilateral groin, right side is worse. Pain is causing urgency and frequency. He has also has some incontinence. Hx of kidney stone.     BP Readings from Last 3 Encounters:   02/02/23 136/68   05/04/22 138/70   03/03/22 134/80      Pulse Readings from Last 3 Encounters:   02/02/23 86   05/04/22 84   03/03/22 97      Wt Readings from Last 3 Encounters:   02/02/23 (!) 443 lb (200.9 kg)   05/04/22 (!) 425 lb 3.2 oz (192.9 kg)   03/03/22 (!) 431 lb 11.2 oz (195.8 kg)        Current Outpatient Medications   Medication Sig Dispense Refill    meloxicam (MOBIC) 15 MG tablet Take 1 tablet by mouth daily as needed for Pain 30 tablet 0    sildenafil (VIAGRA) 100 MG tablet Take 1 tablet by mouth as needed for Erectile Dysfunction 30 tablet 0    hydroCHLOROthiazide (HYDRODIURIL) 12.5 MG tablet take 1 tablet by mouth once daily 90 tablet 5    venlafaxine (EFFEXOR XR) 150 MG extended release capsule Take 1 capsule by mouth daily 90 capsule 2    carvedilol (COREG) 3.125 MG tablet take 1 tablet by mouth twice a day with meals 180 tablet 2    vitamin D (ERGOCALCIFEROL) 1.25 MG (04452 UT) CAPS capsule Take 1 capsule by mouth once a week 4 capsule 2    Blood Pressure KIT 1 Units by Does not apply route 2 times daily 1 kit 0     No current facility-administered medications for this visit.         Past Medical History:   Diagnosis Date    Anxiety     Chronic lumbar pain     Chronic neck pain     Depression     including hospitalization    Extreme obesity     Headache     History of migraine     Hyperlipidemia     Hypertension     Intractable migraine with aura without status migrainosus 8/22/2017    Kidney stones     Lumbar disc disease     Neuropathy     Right sided weakness     Snores     no cpap       Past Surgical History:   Procedure Laterality Date    INGUINAL HERNIA REPAIR Right     LITHOTRIPSY      LUMBAR LAMINECTOMY  10/31/2018    LUMBAR LAMINECTOMY  06/2021    Dr. Eleni Reis       Social History     Tobacco Use    Smoking status: Never    Smokeless tobacco: Current     Types: Chew   Vaping Use    Vaping Use: Never used   Substance Use Topics    Alcohol use: Not Currently     Alcohol/week: 0.0 standard drinks     Comment: occasionally    Drug use: No      Family History   Problem Relation Age of Onset    High Blood Pressure Father     Alzheimer's Disease Maternal Grandmother        Review of Systems:     Review of Systems   Constitutional:  Negative for chills and fever. Gastrointestinal:  Positive for abdominal pain (lower). Negative for diarrhea, nausea and vomiting. Genitourinary:  Positive for flank pain (right), frequency and urgency. Negative for dysuria, hematuria and testicular pain. Musculoskeletal:  Positive for arthralgias (bilateral hip radiates to the groin), back pain (lower back and bilateral hip) and gait problem. Psychiatric/Behavioral:  Positive for sleep disturbance. PHQ Scores 5/4/2022 12/31/2021 6/10/2020 10/11/2018 11/27/2017   PHQ2 Score 1 0 1 0 6   PHQ9 Score 2 0 1 0 18     Interpretation of Total Score Depression Severity: 1-4 = Minimal depression, 5-9 = Mild depression, 10-14 = Moderate depression, 15-19 = Moderately severe depression, 20-27 = Severe depression     Physical Exam:     Vitals:    02/02/23 0820   BP: 136/68   Pulse: 86   SpO2: 93%   Weight: (!) 443 lb (200.9 kg)      Body mass index is 53.92 kg/m². Physical Exam  Constitutional:       Appearance: He is obese. Comments: Appears uncomfortable   HENT:      Head: Normocephalic. Eyes:      Conjunctiva/sclera: Conjunctivae normal.   Cardiovascular:      Rate and Rhythm: Normal rate and regular rhythm. Heart sounds: Normal heart sounds. Pulmonary:      Effort: Pulmonary effort is normal. No respiratory distress. Breath sounds: Normal breath sounds. No wheezing. Abdominal:      General: Bowel sounds are normal.      Palpations: Abdomen is soft. Tenderness: There is abdominal tenderness in the suprapubic area. There is no right CVA tenderness or left CVA tenderness. Musculoskeletal:      Cervical back: Neck supple.       Lumbar back: Tenderness present. Decreased range of motion. Positive right straight leg raise test and positive left straight leg raise test.      Right hip: Tenderness present. Decreased range of motion. Left hip: Tenderness present. Decreased range of motion. Neurological:      Mental Status: He is alert and oriented to person, place, and time. Please note that this chart was generated using voice recognition Dragon dictation software. Although every effort was made to ensure the accuracy of this automated transcription, some errors in transcription may have occurred.     Electronically signed by GORAN Larios CNP on 2/5/2023

## 2023-02-03 DIAGNOSIS — M25.552 BILATERAL HIP PAIN: Primary | ICD-10-CM

## 2023-02-03 DIAGNOSIS — R39.15 URGENCY OF URINATION: ICD-10-CM

## 2023-02-03 DIAGNOSIS — R10.32 GROIN PAIN, LEFT: ICD-10-CM

## 2023-02-03 DIAGNOSIS — M25.551 BILATERAL HIP PAIN: Primary | ICD-10-CM

## 2023-02-03 DIAGNOSIS — R35.0 FREQUENCY OF URINATION: ICD-10-CM

## 2023-02-03 DIAGNOSIS — R31.9 HEMATURIA, UNSPECIFIED TYPE: ICD-10-CM

## 2023-02-03 LAB
MICROORGANISM SPEC CULT: NO GROWTH
SPECIMEN DESCRIPTION: NORMAL

## 2023-02-05 ASSESSMENT — ENCOUNTER SYMPTOMS
VOMITING: 0
ABDOMINAL PAIN: 1
NAUSEA: 0
DIARRHEA: 0

## 2023-02-16 DIAGNOSIS — K40.90 RIGHT INGUINAL HERNIA: Primary | ICD-10-CM

## 2023-02-23 ENCOUNTER — INITIAL CONSULT (OUTPATIENT)
Dept: SURGERY | Age: 43
End: 2023-02-23
Payer: COMMERCIAL

## 2023-02-23 VITALS
SYSTOLIC BLOOD PRESSURE: 132 MMHG | DIASTOLIC BLOOD PRESSURE: 84 MMHG | WEIGHT: 315 LBS | TEMPERATURE: 97.8 F | BODY MASS INDEX: 38.36 KG/M2 | HEART RATE: 90 BPM | HEIGHT: 76 IN | OXYGEN SATURATION: 96 %

## 2023-02-23 DIAGNOSIS — N52.9 ERECTILE DYSFUNCTION, UNSPECIFIED ERECTILE DYSFUNCTION TYPE: ICD-10-CM

## 2023-02-23 DIAGNOSIS — R10.31 RIGHT INGUINAL PAIN: Primary | ICD-10-CM

## 2023-02-23 DIAGNOSIS — R32 URINARY INCONTINENCE, UNSPECIFIED TYPE: ICD-10-CM

## 2023-02-23 DIAGNOSIS — M54.50 RIGHT LOW BACK PAIN, UNSPECIFIED CHRONICITY, UNSPECIFIED WHETHER SCIATICA PRESENT: ICD-10-CM

## 2023-02-23 PROCEDURE — G8427 DOCREV CUR MEDS BY ELIG CLIN: HCPCS | Performed by: SURGERY

## 2023-02-23 PROCEDURE — G8417 CALC BMI ABV UP PARAM F/U: HCPCS | Performed by: SURGERY

## 2023-02-23 PROCEDURE — 99204 OFFICE O/P NEW MOD 45 MIN: CPT | Performed by: SURGERY

## 2023-02-23 PROCEDURE — 4004F PT TOBACCO SCREEN RCVD TLK: CPT | Performed by: SURGERY

## 2023-02-23 PROCEDURE — G8484 FLU IMMUNIZE NO ADMIN: HCPCS | Performed by: SURGERY

## 2023-02-23 PROCEDURE — 3079F DIAST BP 80-89 MM HG: CPT | Performed by: SURGERY

## 2023-02-23 PROCEDURE — 3075F SYST BP GE 130 - 139MM HG: CPT | Performed by: SURGERY

## 2023-02-23 RX ORDER — SENNOSIDES 8.6 MG
CAPSULE ORAL
COMMUNITY
Start: 2020-10-22

## 2023-02-23 NOTE — ASSESSMENT & PLAN NOTE
After discussion of the patient's symptoms with him today, review of recent radiology testing I actually do not think that the patient has a right inguinal hernia. The CT scan is showing some haziness or artifact in the right inguinal region but the patient has had a prior right inguinal hernia repair and this may just represent scar tissue versus mesh. Certainly I do not see any clear evidence of tissue extending into or out of of the inguinal canal on the CT scan. Based on the patient's other symptoms I am more concerned that he may have some sort of neurological issue especially as he is having back pain that is radiating around into the groin and is also having genitourinary symptoms that do raise concern for some sort of neurological issue including being incontinent of urine. While some of his inguinal pain could be related to the prior surgery and possible nerve entrapment with the other symptoms and the description of his inguinal pain I think this is less likely. Certainly I do not think there is any reason that we need to go ahead and proceed with surgery at this point. I have discussed with him potentially getting additional imaging of the right inguinal region specifically to further evaluate. I am also going to try to get the images from his recent CT scan pushed to our PACS system so that I can discuss with one of the radiologist here. No surgical interventions are planned at this point. I have discussed with the patient that I think he would benefit from neurological evaluation with a neurologist or potentially even neurosurgery and. I will defer referral back to his PCP but will send my recommendations to her. No further general surgery follow-up needed at this point. If we were to proceed with any sort of surgical intervention for hernia he would be at very high risk for recurrence and complication because of his morbid obesity.   Would likely recommend weight loss before any sort of elective hernia repairs.

## 2023-02-23 NOTE — PROGRESS NOTES
Subjective   Efren Damon is a 44 y.o. male who presents today for valuation of right inguinal hernia. Patient reports that he thought he was coming into the office today to discuss back issues. However it seems that he was referred to my office for for right inguinal hernia. On discussion with the patient today he states that for \"a long time\" he has been having lower back pain that radiates around into the right groin as well. States that this seems to be made worse with physical activity and especially if he bends over or lifts something heavy or even when he is twisting will cause the symptoms. In addition to those symptoms he also states that often times he will get numbness into the lower extremity on that side. As we talked more it also seems that he has been experiencing urinary incontinence as well as erectile dysfunction since these pain symptoms started. He was seen by his PCP and per the patient's report there was concern for a kidney stone and he was sent for a CT scan. CT scan did not show any evidence of a kidney stone but incidentally he was diagnosed with an right inguinal hernia. Denies any changes in bowel movements. No blood per rectum. No nausea or emesis. Patient does report that he has had a prior right inguinal hernia in the past through an open procedure. He is unsure as to when this was done. Does not remember where it was done but thinks it may have been at [facility] or [facility]. Does not have any other details as to the procedure.     Past Medical History:   Diagnosis Date    Anxiety     Chronic lumbar pain     Chronic neck pain     Depression     including hospitalization    Extreme obesity     Headache     History of migraine     Hyperlipidemia     Hypertension     Intractable migraine with aura without status migrainosus 8/22/2017    Kidney stones     Lumbar disc disease     Neuropathy     Right sided weakness     Snores     no cpap       Past Surgical History:   Procedure Laterality Date    INGUINAL HERNIA REPAIR Right     unsure of date and what hospital. [address]  10/31/2018    LUMBAR LAMINECTOMY  06/2021    Dr. aMx Zavaleta       Current Outpatient Medications   Medication Sig Dispense Refill    acetaminophen (TYLENOL) 650 MG extended release tablet Acetaminophen (Tylenol 8 Hour) 650 mg tablet extended release Active 1300 MG PO Q8H 30 October 22nd, 2020 10:32pm      meloxicam (MOBIC) 15 MG tablet Take 1 tablet by mouth daily as needed for Pain 30 tablet 0    sildenafil (VIAGRA) 100 MG tablet Take 1 tablet by mouth as needed for Erectile Dysfunction 30 tablet 0    hydroCHLOROthiazide (HYDRODIURIL) 12.5 MG tablet take 1 tablet by mouth once daily 90 tablet 5    venlafaxine (EFFEXOR XR) 150 MG extended release capsule Take 1 capsule by mouth daily 90 capsule 2    carvedilol (COREG) 3.125 MG tablet take 1 tablet by mouth twice a day with meals 180 tablet 2    vitamin D (ERGOCALCIFEROL) 1.25 MG (88387 UT) CAPS capsule Take 1 capsule by mouth once a week 4 capsule 2    Blood Pressure KIT 1 Units by Does not apply route 2 times daily 1 kit 0     No current facility-administered medications for this visit.        No Known Allergies    Family History   Problem Relation Age of Onset    High Blood Pressure Father     Alzheimer's Disease Maternal Grandmother        Social History     Socioeconomic History    Marital status: Single     Spouse name: Not on file    Number of children: Not on file    Years of education: Not on file    Highest education level: Not on file   Occupational History    Not on file   Tobacco Use    Smoking status: Never    Smokeless tobacco: Current     Types: Chew   Vaping Use    Vaping Use: Never used   Substance and Sexual Activity    Alcohol use: Not Currently     Alcohol/week: 0.0 standard drinks     Comment: occasionally    Drug use: No    Sexual activity: Not on file   Other Topics Concern Not on file   Social History Narrative    ** Merged History Encounter **          Social Determinants of Health     Financial Resource Strain: Not on file   Food Insecurity: Not on file   Transportation Needs: Not on file   Physical Activity: Not on file   Stress: Not on file   Social Connections: Not on file   Intimate Partner Violence: Not on file   Housing Stability: Not on file       ROS:   Review of Systems - General ROS: negative  Psychological ROS: negative  Ophthalmic ROS: negative  ENT ROS: negative  Respiratory ROS: no cough, shortness of breath, or wheezing  Cardiovascular ROS: no chest pain or dyspnea on exertion  Gastrointestinal ROS: per HPI  Genito-Urinary ROS: Reporting urinary incontinence and erectile dysfunction  Musculoskeletal ROS: Patient is reporting that he does get numbness into the right lower extremity that is associated with his ongoing back pain. Dermatological ROS: negative      Objective   Vitals:    02/23/23 1452   BP: 132/84   Pulse: 90   Temp: 97.8 °F (36.6 °C)   SpO2: 96%     General:in no apparent distress and well developed and well nourished  Eyes: No gross abnormalities. Ears, Nose, Throat: hearing grossly normal bilaterally  Neck: neck supple and non tender without mass  Lungs: clear to auscultation without wheezes or rales   Heart: S1S2, no mumurs, RRR  Abdomen: Soft, obese, no tenderness palpation, bowel sounds present. I do not appreciate palpable inguinal hernia on exam today. Exam is somewhat limited by body habitus. Extremity: negative  Neuro: CN II-XII grossly intact    Recent CT scan of abdomen pelvis from outside facility images reviewed and read noted. I have also discussed the imaging with one of the radiologist here for over read. 1. Right inguinal pain  Assessment & Plan:  After discussion of the patient's symptoms with him today, review of recent radiology testing I actually do not think that the patient has a right inguinal hernia.   The CT scan is showing some haziness or artifact in the right inguinal region but the patient has had a prior right inguinal hernia repair and this may just represent scar tissue versus mesh. Certainly I do not see any clear evidence of tissue extending into or out of of the inguinal canal on the CT scan. Based on the patient's other symptoms I am more concerned that he may have some sort of neurological issue especially as he is having back pain that is radiating around into the groin and is also having genitourinary symptoms that do raise concern for some sort of neurological issue including being incontinent of urine. While some of his inguinal pain could be related to the prior surgery and possible nerve entrapment with the other symptoms and the description of his inguinal pain I think this is less likely. Certainly I do not think there is any reason that we need to go ahead and proceed with surgery at this point. I have discussed with him potentially getting additional imaging of the right inguinal region specifically to further evaluate. I am also going to try to get the images from his recent CT scan pushed to our PACS system so that I can discuss with one of the radiologist here. No surgical interventions are planned at this point. I have discussed with the patient that I think he would benefit from neurological evaluation with a neurologist or potentially even neurosurgery and. I will defer referral back to his PCP but will send my recommendations to her. No further general surgery follow-up needed at this point. If we were to proceed with any sort of surgical intervention for hernia he would be at very high risk for recurrence and complication because of his morbid obesity. Would likely recommend weight loss before any sort of elective hernia repairs. 2. Right low back pain, unspecified chronicity, unspecified whether sciatica present  3. Urinary incontinence, unspecified type  4.  Erectile dysfunction, unspecified erectile dysfunction type       (Please note that portions of this note were completed with a voice recognition program.  Efforts were made to edit the dictations but occasionally words are mis-transcribed.)

## 2023-05-11 ENCOUNTER — OFFICE VISIT (OUTPATIENT)
Dept: FAMILY MEDICINE CLINIC | Age: 43
End: 2023-05-11
Payer: COMMERCIAL

## 2023-05-11 VITALS
OXYGEN SATURATION: 96 % | DIASTOLIC BLOOD PRESSURE: 78 MMHG | BODY MASS INDEX: 56.89 KG/M2 | SYSTOLIC BLOOD PRESSURE: 126 MMHG | WEIGHT: 315 LBS | HEART RATE: 88 BPM

## 2023-05-11 DIAGNOSIS — J40 BRONCHITIS: Primary | ICD-10-CM

## 2023-05-11 DIAGNOSIS — E66.8 EXTREME OBESITY: ICD-10-CM

## 2023-05-11 DIAGNOSIS — G93.32 POST-COVID CHRONIC FATIGUE: ICD-10-CM

## 2023-05-11 DIAGNOSIS — R06.02 SHORTNESS OF BREATH: ICD-10-CM

## 2023-05-11 DIAGNOSIS — U09.9 POST-COVID CHRONIC FATIGUE: ICD-10-CM

## 2023-05-11 DIAGNOSIS — I10 ESSENTIAL HYPERTENSION: ICD-10-CM

## 2023-05-11 DIAGNOSIS — R73.01 IMPAIRED FASTING GLUCOSE: ICD-10-CM

## 2023-05-11 DIAGNOSIS — M25.50 MULTIPLE JOINT PAIN: ICD-10-CM

## 2023-05-11 PROCEDURE — 3074F SYST BP LT 130 MM HG: CPT | Performed by: NURSE PRACTITIONER

## 2023-05-11 PROCEDURE — G8417 CALC BMI ABV UP PARAM F/U: HCPCS | Performed by: NURSE PRACTITIONER

## 2023-05-11 PROCEDURE — 3078F DIAST BP <80 MM HG: CPT | Performed by: NURSE PRACTITIONER

## 2023-05-11 PROCEDURE — G8427 DOCREV CUR MEDS BY ELIG CLIN: HCPCS | Performed by: NURSE PRACTITIONER

## 2023-05-11 PROCEDURE — 4004F PT TOBACCO SCREEN RCVD TLK: CPT | Performed by: NURSE PRACTITIONER

## 2023-05-11 PROCEDURE — 99214 OFFICE O/P EST MOD 30 MIN: CPT | Performed by: NURSE PRACTITIONER

## 2023-05-11 RX ORDER — PREDNISONE 20 MG/1
20 TABLET ORAL 2 TIMES DAILY
Qty: 10 TABLET | Refills: 0 | Status: SHIPPED | OUTPATIENT
Start: 2023-05-11 | End: 2023-05-16

## 2023-05-11 RX ORDER — AZITHROMYCIN 250 MG/1
250 TABLET, FILM COATED ORAL DAILY
Qty: 1 PACKET | Refills: 0 | Status: SHIPPED | OUTPATIENT
Start: 2023-05-11 | End: 2023-05-16

## 2023-05-11 ASSESSMENT — ENCOUNTER SYMPTOMS
CONSTIPATION: 0
COUGH: 1
DIARRHEA: 0
SHORTNESS OF BREATH: 1
SORE THROAT: 0
SINUS PAIN: 0
RHINORRHEA: 0
ABDOMINAL PAIN: 0
NAUSEA: 0

## 2023-05-12 DIAGNOSIS — F33.1 MODERATE EPISODE OF RECURRENT MAJOR DEPRESSIVE DISORDER (HCC): ICD-10-CM

## 2023-05-12 DIAGNOSIS — I10 ESSENTIAL HYPERTENSION: ICD-10-CM

## 2023-05-15 RX ORDER — CARVEDILOL 3.12 MG/1
TABLET ORAL
Qty: 180 TABLET | Refills: 2 | Status: SHIPPED | OUTPATIENT
Start: 2023-05-15

## 2023-05-15 RX ORDER — VENLAFAXINE HYDROCHLORIDE 150 MG/1
CAPSULE, EXTENDED RELEASE ORAL
Qty: 90 CAPSULE | Refills: 2 | Status: SHIPPED | OUTPATIENT
Start: 2023-05-15

## 2023-05-15 NOTE — TELEPHONE ENCOUNTER
Luis Angel Huddleston is calling to request a refill on the following medication(s):  Requested Prescriptions     Pending Prescriptions Disp Refills    venlafaxine (EFFEXOR XR) 150 MG extended release capsule [Pharmacy Med Name: VENLAFAXINE HCL  MG CAP] 90 capsule 2     Sig: take 1 capsule by mouth once daily    carvedilol (COREG) 3.125 MG tablet [Pharmacy Med Name: CARVEDILOL 3.125 MG TABLET] 180 tablet 2     Sig: take 1 tablet by mouth twice a day with meals       Last Visit Date (If Applicable):  6/59/0519    Next Visit Date:    6/15/2023

## 2023-05-17 ENCOUNTER — TELEPHONE (OUTPATIENT)
Dept: FAMILY MEDICINE CLINIC | Age: 43
End: 2023-05-17

## 2023-05-19 DIAGNOSIS — E66.8 EXTREME OBESITY: Primary | ICD-10-CM

## 2023-05-26 PROBLEM — R73.01 IMPAIRED FASTING GLUCOSE: Status: ACTIVE | Noted: 2023-05-26

## 2023-05-26 PROBLEM — G93.32 POST-COVID CHRONIC FATIGUE: Status: ACTIVE | Noted: 2023-05-26

## 2023-05-26 PROBLEM — U09.9 POST-COVID CHRONIC FATIGUE: Status: ACTIVE | Noted: 2023-05-26

## 2023-05-26 ASSESSMENT — ENCOUNTER SYMPTOMS
DIFFICULTY BREATHING: 1
WHEEZING: 0
SPUTUM PRODUCTION: 1

## 2023-05-26 NOTE — ASSESSMENT & PLAN NOTE
Start Mounjaro as discussed for elevated glucose and obesity. Reviewed medication desired effects, potential side effects, and how to take the medication.     Glucose   Date Value Ref Range Status   04/18/2023 125 (H) 75 - 110 mg/dL Final     Glucose, Urine   Date Value Ref Range Status   12/26/2017 NEGATIVE NEGATIVE mg/dl Final     POC Glucose   Date Value Ref Range Status   08/26/2017 98 75 - 110 mg/dL Final

## 2023-06-22 ENCOUNTER — OFFICE VISIT (OUTPATIENT)
Dept: FAMILY MEDICINE CLINIC | Age: 43
End: 2023-06-22
Payer: COMMERCIAL

## 2023-06-22 VITALS
HEART RATE: 85 BPM | WEIGHT: 315 LBS | SYSTOLIC BLOOD PRESSURE: 130 MMHG | OXYGEN SATURATION: 98 % | BODY MASS INDEX: 55.53 KG/M2 | DIASTOLIC BLOOD PRESSURE: 76 MMHG

## 2023-06-22 DIAGNOSIS — E66.01 CLASS 3 SEVERE OBESITY DUE TO EXCESS CALORIES WITH SERIOUS COMORBIDITY AND BODY MASS INDEX (BMI) OF 50.0 TO 59.9 IN ADULT (HCC): Primary | ICD-10-CM

## 2023-06-22 DIAGNOSIS — I10 ESSENTIAL HYPERTENSION: ICD-10-CM

## 2023-06-22 DIAGNOSIS — M25.50 MULTIPLE JOINT PAIN: ICD-10-CM

## 2023-06-22 DIAGNOSIS — E78.2 MIXED HYPERLIPIDEMIA: ICD-10-CM

## 2023-06-22 PROCEDURE — G8417 CALC BMI ABV UP PARAM F/U: HCPCS | Performed by: NURSE PRACTITIONER

## 2023-06-22 PROCEDURE — 99214 OFFICE O/P EST MOD 30 MIN: CPT | Performed by: NURSE PRACTITIONER

## 2023-06-22 PROCEDURE — 4004F PT TOBACCO SCREEN RCVD TLK: CPT | Performed by: NURSE PRACTITIONER

## 2023-06-22 PROCEDURE — G8427 DOCREV CUR MEDS BY ELIG CLIN: HCPCS | Performed by: NURSE PRACTITIONER

## 2023-06-22 PROCEDURE — 3074F SYST BP LT 130 MM HG: CPT | Performed by: NURSE PRACTITIONER

## 2023-06-22 PROCEDURE — 3078F DIAST BP <80 MM HG: CPT | Performed by: NURSE PRACTITIONER

## 2023-06-22 RX ORDER — BLOOD SUGAR DIAGNOSTIC
1 STRIP MISCELLANEOUS DAILY
Qty: 30 EACH | Refills: 0 | Status: CANCELLED | OUTPATIENT
Start: 2023-06-22

## 2023-06-22 RX ORDER — OXYBUTYNIN CHLORIDE 10 MG/1
TABLET, EXTENDED RELEASE ORAL
COMMUNITY
Start: 2023-06-17

## 2023-06-30 ASSESSMENT — PATIENT HEALTH QUESTIONNAIRE - PHQ9
SUM OF ALL RESPONSES TO PHQ QUESTIONS 1-9: 3
6. FEELING BAD ABOUT YOURSELF - OR THAT YOU ARE A FAILURE OR HAVE LET YOURSELF OR YOUR FAMILY DOWN: 1
7. TROUBLE CONCENTRATING ON THINGS, SUCH AS READING THE NEWSPAPER OR WATCHING TELEVISION: 0
4. FEELING TIRED OR HAVING LITTLE ENERGY: 1
9. THOUGHTS THAT YOU WOULD BE BETTER OFF DEAD, OR OF HURTING YOURSELF: 0
1. LITTLE INTEREST OR PLEASURE IN DOING THINGS: 0
SUM OF ALL RESPONSES TO PHQ QUESTIONS 1-9: 3
10. IF YOU CHECKED OFF ANY PROBLEMS, HOW DIFFICULT HAVE THESE PROBLEMS MADE IT FOR YOU TO DO YOUR WORK, TAKE CARE OF THINGS AT HOME, OR GET ALONG WITH OTHER PEOPLE: 0
8. MOVING OR SPEAKING SO SLOWLY THAT OTHER PEOPLE COULD HAVE NOTICED. OR THE OPPOSITE, BEING SO FIGETY OR RESTLESS THAT YOU HAVE BEEN MOVING AROUND A LOT MORE THAN USUAL: 0
SUM OF ALL RESPONSES TO PHQ9 QUESTIONS 1 & 2: 1
3. TROUBLE FALLING OR STAYING ASLEEP: 0
SUM OF ALL RESPONSES TO PHQ QUESTIONS 1-9: 3
5. POOR APPETITE OR OVEREATING: 0
SUM OF ALL RESPONSES TO PHQ QUESTIONS 1-9: 3
2. FEELING DOWN, DEPRESSED OR HOPELESS: 1

## 2023-06-30 ASSESSMENT — ENCOUNTER SYMPTOMS
DIARRHEA: 0
CONSTIPATION: 0
COUGH: 0
WHEEZING: 0
ABDOMINAL PAIN: 0
VOMITING: 0
NAUSEA: 0
SHORTNESS OF BREATH: 0

## 2023-07-10 ENCOUNTER — TELEPHONE (OUTPATIENT)
Dept: FAMILY MEDICINE CLINIC | Age: 43
End: 2023-07-10

## 2023-07-10 NOTE — TELEPHONE ENCOUNTER
----- Message from Paulie Allan sent at 7/10/2023  8:45 AM EDT -----  Subject: Message to Provider    QUESTIONS  Information for Provider? Patient phoned wanted to speak with Erma Oneil or Raul Escalera   in regards to [address] [facility] refill that patient is needing to  in the   office today (patient ran out of RX 7/9)-office closed - please call   patient back  ---------------------------------------------------------------------------  --------------  [address]  [phone]; Do not leave any message, patient will call back for answer  ---------------------------------------------------------------------------  --------------  SCRIPT ANSWERS  Relationship to Patient?  Self

## 2023-07-13 ENCOUNTER — OFFICE VISIT (OUTPATIENT)
Dept: FAMILY MEDICINE CLINIC | Age: 43
End: 2023-07-13
Payer: COMMERCIAL

## 2023-07-13 VITALS
SYSTOLIC BLOOD PRESSURE: 140 MMHG | DIASTOLIC BLOOD PRESSURE: 90 MMHG | BODY MASS INDEX: 53.78 KG/M2 | HEART RATE: 80 BPM | OXYGEN SATURATION: 98 % | WEIGHT: 315 LBS

## 2023-07-13 DIAGNOSIS — E66.01 CLASS 3 SEVERE OBESITY DUE TO EXCESS CALORIES WITH SERIOUS COMORBIDITY AND BODY MASS INDEX (BMI) OF 50.0 TO 59.9 IN ADULT (HCC): Primary | ICD-10-CM

## 2023-07-13 DIAGNOSIS — M54.41 CHRONIC BILATERAL LOW BACK PAIN WITH RIGHT-SIDED SCIATICA: ICD-10-CM

## 2023-07-13 DIAGNOSIS — M25.50 MULTIPLE JOINT PAIN: ICD-10-CM

## 2023-07-13 DIAGNOSIS — G89.29 CHRONIC BILATERAL LOW BACK PAIN WITH RIGHT-SIDED SCIATICA: ICD-10-CM

## 2023-07-13 DIAGNOSIS — E78.2 MIXED HYPERLIPIDEMIA: ICD-10-CM

## 2023-07-13 DIAGNOSIS — I10 ESSENTIAL HYPERTENSION: ICD-10-CM

## 2023-07-13 PROCEDURE — G8417 CALC BMI ABV UP PARAM F/U: HCPCS | Performed by: NURSE PRACTITIONER

## 2023-07-13 PROCEDURE — 3078F DIAST BP <80 MM HG: CPT | Performed by: NURSE PRACTITIONER

## 2023-07-13 PROCEDURE — 3074F SYST BP LT 130 MM HG: CPT | Performed by: NURSE PRACTITIONER

## 2023-07-13 PROCEDURE — 4004F PT TOBACCO SCREEN RCVD TLK: CPT | Performed by: NURSE PRACTITIONER

## 2023-07-13 PROCEDURE — G8427 DOCREV CUR MEDS BY ELIG CLIN: HCPCS | Performed by: NURSE PRACTITIONER

## 2023-07-13 PROCEDURE — 99214 OFFICE O/P EST MOD 30 MIN: CPT | Performed by: NURSE PRACTITIONER

## 2023-07-17 ENCOUNTER — NURSE ONLY (OUTPATIENT)
Dept: FAMILY MEDICINE CLINIC | Age: 43
End: 2023-07-17

## 2023-07-17 VITALS — WEIGHT: 315 LBS | BODY MASS INDEX: 53.44 KG/M2

## 2023-07-17 DIAGNOSIS — E66.01 CLASS 3 SEVERE OBESITY DUE TO EXCESS CALORIES WITH SERIOUS COMORBIDITY AND BODY MASS INDEX (BMI) OF 50.0 TO 59.9 IN ADULT (HCC): Primary | ICD-10-CM

## 2023-07-17 PROCEDURE — 99999 PR OFFICE/OUTPT VISIT,PROCEDURE ONLY: CPT | Performed by: NURSE PRACTITIONER

## 2023-07-23 PROBLEM — M54.41 CHRONIC BILATERAL LOW BACK PAIN WITH RIGHT-SIDED SCIATICA: Status: ACTIVE | Noted: 2023-02-23

## 2023-07-23 PROBLEM — G89.29 CHRONIC BILATERAL LOW BACK PAIN WITH RIGHT-SIDED SCIATICA: Status: ACTIVE | Noted: 2023-02-23

## 2023-07-24 ENCOUNTER — NURSE ONLY (OUTPATIENT)
Dept: FAMILY MEDICINE CLINIC | Age: 43
End: 2023-07-24

## 2023-07-24 DIAGNOSIS — E66.01 CLASS 3 SEVERE OBESITY DUE TO EXCESS CALORIES WITH SERIOUS COMORBIDITY AND BODY MASS INDEX (BMI) OF 50.0 TO 59.9 IN ADULT (HCC): Primary | ICD-10-CM

## 2023-07-31 ENCOUNTER — NURSE ONLY (OUTPATIENT)
Dept: FAMILY MEDICINE CLINIC | Age: 43
End: 2023-07-31

## 2023-07-31 VITALS — WEIGHT: 315 LBS | BODY MASS INDEX: 53.24 KG/M2

## 2023-07-31 VITALS — BODY MASS INDEX: 52.1 KG/M2 | WEIGHT: 315 LBS

## 2023-07-31 DIAGNOSIS — E66.01 CLASS 3 SEVERE OBESITY DUE TO EXCESS CALORIES WITH SERIOUS COMORBIDITY AND BODY MASS INDEX (BMI) OF 50.0 TO 59.9 IN ADULT (HCC): Primary | ICD-10-CM

## 2023-08-07 DIAGNOSIS — M54.41 CHRONIC BILATERAL LOW BACK PAIN WITH RIGHT-SIDED SCIATICA: Primary | ICD-10-CM

## 2023-08-07 DIAGNOSIS — G89.29 CHRONIC BILATERAL LOW BACK PAIN WITH RIGHT-SIDED SCIATICA: Primary | ICD-10-CM

## 2023-08-07 DIAGNOSIS — E66.01 CLASS 3 SEVERE OBESITY DUE TO EXCESS CALORIES WITH SERIOUS COMORBIDITY AND BODY MASS INDEX (BMI) OF 50.0 TO 59.9 IN ADULT (HCC): ICD-10-CM

## 2023-08-07 RX ORDER — LIRAGLUTIDE 6 MG/ML
1 INJECTION, SOLUTION SUBCUTANEOUS WEEKLY
Qty: 4 ADJUSTABLE DOSE PRE-FILLED PEN SYRINGE | Refills: 3 | Status: SHIPPED | OUTPATIENT
Start: 2023-08-07

## 2023-08-07 NOTE — TELEPHONE ENCOUNTER
Kiera Grant is calling to request a refill on the following medication(s):  Requested Prescriptions     Pending Prescriptions Disp Refills    liraglutide-weight management (SAXENDA) 18 MG/3ML SOPN 4 Adjustable Dose Pre-filled Pen Syringe 3     Sig: Inject 1 pen  into the skin once a week       Last Visit Date (If Applicable):  8/70/0967    Next Visit Date:    8/17/2023

## 2023-08-08 ENCOUNTER — TELEPHONE (OUTPATIENT)
Dept: FAMILY MEDICINE CLINIC | Age: 43
End: 2023-08-08

## 2023-08-08 NOTE — TELEPHONE ENCOUNTER
Pt would like to start adipex since he can't get Saxenda due to out of pocket cost of over $300 a week. Will check in to discount savings card but would like an alternative sent in.

## 2023-08-14 ENCOUNTER — OFFICE VISIT (OUTPATIENT)
Dept: FAMILY MEDICINE CLINIC | Age: 43
End: 2023-08-14
Payer: COMMERCIAL

## 2023-08-14 VITALS
TEMPERATURE: 98 F | BODY MASS INDEX: 53.92 KG/M2 | SYSTOLIC BLOOD PRESSURE: 134 MMHG | HEART RATE: 96 BPM | DIASTOLIC BLOOD PRESSURE: 78 MMHG | OXYGEN SATURATION: 96 % | WEIGHT: 315 LBS

## 2023-08-14 DIAGNOSIS — E66.01 CLASS 3 SEVERE OBESITY DUE TO EXCESS CALORIES WITH SERIOUS COMORBIDITY AND BODY MASS INDEX (BMI) OF 50.0 TO 59.9 IN ADULT (HCC): ICD-10-CM

## 2023-08-14 DIAGNOSIS — R20.0 NUMBNESS AND TINGLING: Primary | ICD-10-CM

## 2023-08-14 DIAGNOSIS — N52.9 ERECTILE DYSFUNCTION, UNSPECIFIED ERECTILE DYSFUNCTION TYPE: ICD-10-CM

## 2023-08-14 DIAGNOSIS — I10 ESSENTIAL HYPERTENSION: ICD-10-CM

## 2023-08-14 DIAGNOSIS — R53.83 FATIGUE, UNSPECIFIED TYPE: ICD-10-CM

## 2023-08-14 DIAGNOSIS — E78.2 MIXED HYPERLIPIDEMIA: ICD-10-CM

## 2023-08-14 DIAGNOSIS — R73.01 IMPAIRED FASTING GLUCOSE: ICD-10-CM

## 2023-08-14 DIAGNOSIS — R20.2 NUMBNESS AND TINGLING: Primary | ICD-10-CM

## 2023-08-14 PROCEDURE — 4004F PT TOBACCO SCREEN RCVD TLK: CPT | Performed by: NURSE PRACTITIONER

## 2023-08-14 PROCEDURE — G8417 CALC BMI ABV UP PARAM F/U: HCPCS | Performed by: NURSE PRACTITIONER

## 2023-08-14 PROCEDURE — G8427 DOCREV CUR MEDS BY ELIG CLIN: HCPCS | Performed by: NURSE PRACTITIONER

## 2023-08-14 PROCEDURE — 99214 OFFICE O/P EST MOD 30 MIN: CPT | Performed by: NURSE PRACTITIONER

## 2023-08-14 PROCEDURE — 3075F SYST BP GE 130 - 139MM HG: CPT | Performed by: NURSE PRACTITIONER

## 2023-08-14 PROCEDURE — 3078F DIAST BP <80 MM HG: CPT | Performed by: NURSE PRACTITIONER

## 2023-08-14 ASSESSMENT — ENCOUNTER SYMPTOMS
WHEEZING: 0
NAUSEA: 0
SHORTNESS OF BREATH: 0
COUGH: 0

## 2023-08-14 NOTE — PROGRESS NOTES
[address]  [address] [facility], [address] [facility] [place], [address]  Dept: [phone]  Dept Fax: [phone]    Date of Service:  8/14/2023    Aidan Collier is a 44 y.o. male who presents in office today with Self. Chief Complaint   Patient presents with    Generalized Body Aches     States since Wed last week has had all over body aches and c/o numbness and tingling in arms and legs, anger issues, and thirst is increased, frequent urination has returned        Diagnoses / Plan:   1. Numbness and tingling  -     TSH with Reflex; Future  -     Hemoglobin A1C; Future  -     Basic Metabolic Panel; Future  -     CBC with Auto Differential; Future  -     Vitamin B12; Future  2. Fatigue, unspecified type  -     TSH with Reflex; Future  -     Hemoglobin A1C; Future  -     Basic Metabolic Panel; Future  -     CBC with Auto Differential; Future  -     Vitamin B12; Future  3. Impaired fasting glucose  -     Hemoglobin A1C; Future  -     Basic Metabolic Panel; Future  -     Lipid, Fasting; Future  4. Class 3 severe obesity due to excess calories with serious comorbidity and body mass index (BMI) of 50.0 to 59.9 in adult (HCC)  -     TSH with Reflex; Future  -     Hemoglobin A1C; Future  -     Basic Metabolic Panel; Future  -     Lipid, Fasting; Future  5. Essential hypertension  -     TSH with Reflex; Future  -     Hemoglobin A1C; Future  -     Basic Metabolic Panel; Future  -     Lipid, Fasting; Future  6. Erectile dysfunction, unspecified erectile dysfunction type  7. Mixed hyperlipidemia  -     Lipid, Fasting; Future     Instructed to complete ordered labs for further evaluation of symptoms. Will discuss results and treat as appropriate. Encouraged healthy diet and routine exercise. Instructed to continue current medications. All patient questions answered. Patient voiced understanding. Return if symptoms worsen or fail to improve.      Subjective:   History of Present

## 2023-08-15 LAB
ANION GAP SERPL CALCULATED.3IONS-SCNC: 7.6 MMOL/L (ref 4–12)
BASOPHILS %: 0.87 (ref 0–3)
BASOPHILS ABSOLUTE: 0.07 (ref 0–0.3)
BUN BLDV-MCNC: 21 MG/DL (ref 9–20)
CALCIUM SERPL-MCNC: 9.1 MG/DL (ref 8.4–10.2)
CHLORIDE BLD-SCNC: 108 MMOL/L (ref 98–120)
CO2: 24 MMOL/L (ref 22–31)
CREAT SERPL-MCNC: 0.6 MG/DL (ref 0.7–1.3)
EOSINOPHILS %: 0.87 (ref 0–10)
EOSINOPHILS ABSOLUTE: 0.07 (ref 0–1.1)
GFR CALCULATED: > 60
GLUCOSE: 102 MG/DL (ref 75–110)
HBA1C MFR BLD: 5.1 % (ref 4.4–6.4)
HCT VFR BLD CALC: 49.1 % (ref 42–52)
HEMOGLOBIN: 15.5 (ref 13.8–17.8)
LYMPHOCYTE %: 27.46 (ref 20–51.1)
LYMPHOCYTES ABSOLUTE: 2.11 (ref 1–5.5)
MCH RBC QN AUTO: 28.1 PG (ref 28.5–32.5)
MCHC RBC AUTO-ENTMCNC: 31.6 G/DL (ref 32–37)
MCV RBC AUTO: 89 FL (ref 80–94)
MONOCYTES %: 9.77 (ref 1.7–9.3)
MONOCYTES ABSOLUTE: 0.75 (ref 0.1–1)
NEUTROPHILS %: 61.03 (ref 42.2–75.2)
NEUTROPHILS ABSOLUTE: 4.68 (ref 2–8.1)
PDW BLD-RTO: 13 % (ref 10–15.5)
PLATELET # BLD: 195.3 THOU/MM3 (ref 130–400)
POTASSIUM SERPL-SCNC: 4.7 MMOL/L (ref 3.6–5)
RBC: 5.52 M/UL (ref 4.7–6.1)
SODIUM BLD-SCNC: 140 MMOL/L (ref 135–145)
TSH REFLEX FT4: 1.14 MIU/ML (ref 0.49–4.67)
VITAMIN B-12: 225 PG/ML (ref 239–931)
WBC: 7.7 THOU/ML3 (ref 4.8–10.8)

## 2023-08-17 ENCOUNTER — OFFICE VISIT (OUTPATIENT)
Dept: FAMILY MEDICINE CLINIC | Age: 43
End: 2023-08-17

## 2023-08-17 VITALS
HEART RATE: 82 BPM | DIASTOLIC BLOOD PRESSURE: 94 MMHG | BODY MASS INDEX: 54.12 KG/M2 | OXYGEN SATURATION: 95 % | SYSTOLIC BLOOD PRESSURE: 140 MMHG | WEIGHT: 315 LBS

## 2023-08-17 DIAGNOSIS — E78.2 MIXED HYPERLIPIDEMIA: ICD-10-CM

## 2023-08-17 DIAGNOSIS — I10 ESSENTIAL HYPERTENSION: ICD-10-CM

## 2023-08-17 DIAGNOSIS — E53.8 B12 DEFICIENCY: ICD-10-CM

## 2023-08-17 DIAGNOSIS — E66.01 CLASS 3 SEVERE OBESITY DUE TO EXCESS CALORIES WITH SERIOUS COMORBIDITY AND BODY MASS INDEX (BMI) OF 50.0 TO 59.9 IN ADULT (HCC): Primary | ICD-10-CM

## 2023-08-17 DIAGNOSIS — F33.1 MODERATE EPISODE OF RECURRENT MAJOR DEPRESSIVE DISORDER (HCC): ICD-10-CM

## 2023-08-17 RX ORDER — CYANOCOBALAMIN 1000 UG/ML
1000 INJECTION, SOLUTION INTRAMUSCULAR; SUBCUTANEOUS
Qty: 4 ML | Refills: 0 | Status: SHIPPED | OUTPATIENT
Start: 2023-08-17 | End: 2023-09-08

## 2023-08-17 RX ORDER — CYANOCOBALAMIN 1000 UG/ML
1000 INJECTION, SOLUTION INTRAMUSCULAR; SUBCUTANEOUS ONCE
Status: COMPLETED | OUTPATIENT
Start: 2023-08-17 | End: 2023-08-17

## 2023-08-17 RX ORDER — HYDROCHLOROTHIAZIDE 12.5 MG/1
12.5 TABLET ORAL DAILY
Qty: 90 TABLET | Refills: 3 | Status: SHIPPED | OUTPATIENT
Start: 2023-08-17

## 2023-08-17 RX ORDER — PHENTERMINE HYDROCHLORIDE 37.5 MG/1
37.5 TABLET ORAL
Qty: 30 TABLET | Refills: 0 | Status: SHIPPED | OUTPATIENT
Start: 2023-08-17 | End: 2023-09-16

## 2023-08-17 RX ADMIN — CYANOCOBALAMIN 1000 MCG: 1000 INJECTION, SOLUTION INTRAMUSCULAR; SUBCUTANEOUS at 09:19

## 2023-08-17 NOTE — PROGRESS NOTES
After obtaining consent, and per orders of Dr. Roseann Mccall, injection of b12 given in Right deltoid by Robby Tamez MA. Patient instructed to remain in clinic for 20 minutes afterwards, and to report any adverse reaction to me immediately.

## 2023-08-17 NOTE — PROGRESS NOTES
[facility]  [address] [facility], [address] [facility] [place], [address]  Dept: [phone]  Dept Fax: [phone]    Date of Service:  8/17/2023    Yasir Dodson is a 44 y.o. male who presents in office today with Self. Chief Complaint   Patient presents with    Results     Here to discuss recent lab results        Diagnoses / Plan:   1. Class 3 severe obesity due to excess calories with serious comorbidity and body mass index (BMI) of 50.0 to 59.9 in adult (HCC)  -     phentermine (ADIPEX-P) 37.5 MG tablet; Take 1 tablet by mouth every morning (before breakfast) for 30 days. Max Daily Amount: 37.5 mg, Disp-30 tablet, R-0Normal  2. B12 deficiency  -     cyanocobalamin 1000 MCG/ML injection; Inject 1 mL into the muscle every 7 days for 4 doses, Disp-4 mL, R-0Normal  -     cyanocobalamin injection 1,000 mcg; 1,000 mcg, IntraMUSCular, ONCE, 1 dose, On u 8/17/23 at 0945  3. Essential hypertension  -     phentermine (ADIPEX-P) 37.5 MG tablet; Take 1 tablet by mouth every morning (before breakfast) for 30 days. Max Daily Amount: 37.5 mg, Disp-30 tablet, R-0Normal  -     hydroCHLOROthiazide (HYDRODIURIL) 12.5 MG tablet; Take 1 tablet by mouth daily, Disp-90 tablet, R-3Normal  4. Mixed hyperlipidemia  -     phentermine (ADIPEX-P) 37.5 MG tablet; Take 1 tablet by mouth every morning (before breakfast) for 30 days. Max Daily Amount: 37.5 mg, Disp-30 tablet, R-0Normal  5. Moderate episode of recurrent major depressive disorder ([address])     Discussed lab results in detail. All questions answered. Start Adipex, counseled regarding the possible side effects, risks, benefits and alternatives to treatment; patient verbalizes understanding, agrees, feels comfortable with and wishes to proceed with above treatment plan. Encouraged healthy diet and routine exercise. Instructed to continue current medications. Return in about 4 weeks (around 9/14/2023).      Subjective:   History of Present

## 2023-08-24 ENCOUNTER — NURSE ONLY (OUTPATIENT)
Dept: FAMILY MEDICINE CLINIC | Age: 43
End: 2023-08-24

## 2023-08-24 VITALS — BODY MASS INDEX: 52.17 KG/M2 | WEIGHT: 315 LBS

## 2023-08-24 DIAGNOSIS — E53.8 B12 DEFICIENCY: Primary | ICD-10-CM

## 2023-08-24 RX ORDER — CYANOCOBALAMIN 1000 UG/ML
1000 INJECTION, SOLUTION INTRAMUSCULAR; SUBCUTANEOUS WEEKLY
Status: SHIPPED | OUTPATIENT
Start: 2023-08-24 | End: 2023-09-21

## 2023-08-24 RX ADMIN — CYANOCOBALAMIN 1000 MCG: 1000 INJECTION, SOLUTION INTRAMUSCULAR; SUBCUTANEOUS at 13:11

## 2023-08-24 NOTE — PROGRESS NOTES
After obtaining consent, and per orders of Dr. Efren Maravilla, injection of b12 given in Left arm by Beka Thacker MA. Patient instructed to remain in clinic for 20 minutes afterwards, and to report any adverse reaction to me immediately.

## 2023-08-27 PROBLEM — E53.8 B12 DEFICIENCY: Status: ACTIVE | Noted: 2023-08-27

## 2023-08-27 ASSESSMENT — ENCOUNTER SYMPTOMS
CONSTIPATION: 0
ABDOMINAL PAIN: 0
SHORTNESS OF BREATH: 0
WHEEZING: 0
VOMITING: 0
NAUSEA: 0
DIARRHEA: 0

## 2023-08-31 ENCOUNTER — NURSE ONLY (OUTPATIENT)
Dept: FAMILY MEDICINE CLINIC | Age: 43
End: 2023-08-31

## 2023-08-31 VITALS — WEIGHT: 315 LBS | BODY MASS INDEX: 51.37 KG/M2

## 2023-08-31 DIAGNOSIS — E53.8 B12 DEFICIENCY: Primary | ICD-10-CM

## 2023-08-31 RX ADMIN — CYANOCOBALAMIN 1000 MCG: 1000 INJECTION, SOLUTION INTRAMUSCULAR; SUBCUTANEOUS at 16:01

## 2023-08-31 NOTE — PROGRESS NOTES
After obtaining consent, and per orders of Dr. Mac Benavides, injection of B12 given in Right deltoid by Nia Floyd MA. Patient instructed to remain in clinic for 20 minutes afterwards, and to report any adverse reaction to me immediately.

## 2023-09-07 ENCOUNTER — NURSE ONLY (OUTPATIENT)
Dept: FAMILY MEDICINE CLINIC | Age: 43
End: 2023-09-07
Payer: COMMERCIAL

## 2023-09-07 VITALS — WEIGHT: 315 LBS | BODY MASS INDEX: 51.61 KG/M2

## 2023-09-07 DIAGNOSIS — E53.8 B12 DEFICIENCY: ICD-10-CM

## 2023-09-07 PROCEDURE — 96372 THER/PROPH/DIAG INJ SC/IM: CPT | Performed by: NURSE PRACTITIONER

## 2023-09-07 RX ADMIN — CYANOCOBALAMIN 1000 MCG: 1000 INJECTION, SOLUTION INTRAMUSCULAR; SUBCUTANEOUS at 15:45

## 2023-09-07 NOTE — TELEPHONE ENCOUNTER
Patient came in today for 4th dose of his weekly B12. He will need a new rx sent in for once a month b12.

## 2023-09-09 DIAGNOSIS — E53.8 B12 DEFICIENCY: ICD-10-CM

## 2023-09-11 NOTE — TELEPHONE ENCOUNTER
Shy Irby is calling to request a refill on the following medication(s):  Requested Prescriptions     Pending Prescriptions Disp Refills    cyanocobalamin 1000 MCG/ML injection [Pharmacy Med Name: CYANOCOBALAMIN 1,000 MCG/ML VL] 4 mL 0     Sig: inject 1 milliliter intramuscularly every week       Last Visit Date (If Applicable):  7/65/5723    Next Visit Date:    9/14/2023

## 2023-09-12 RX ORDER — CYANOCOBALAMIN 1000 UG/ML
1000 INJECTION, SOLUTION INTRAMUSCULAR; SUBCUTANEOUS
Qty: 3 ML | Refills: 1 | OUTPATIENT
Start: 2023-09-12

## 2023-09-12 RX ORDER — CYANOCOBALAMIN 1000 UG/ML
INJECTION, SOLUTION INTRAMUSCULAR; SUBCUTANEOUS
Qty: 1 ML | Refills: 0 | Status: SHIPPED | OUTPATIENT
Start: 2023-09-12 | End: 2023-09-14 | Stop reason: SDUPTHER

## 2023-09-14 ENCOUNTER — OFFICE VISIT (OUTPATIENT)
Dept: FAMILY MEDICINE CLINIC | Age: 43
End: 2023-09-14

## 2023-09-14 VITALS
HEART RATE: 82 BPM | WEIGHT: 315 LBS | BODY MASS INDEX: 51.85 KG/M2 | OXYGEN SATURATION: 99 % | SYSTOLIC BLOOD PRESSURE: 144 MMHG | DIASTOLIC BLOOD PRESSURE: 90 MMHG

## 2023-09-14 DIAGNOSIS — I10 ESSENTIAL HYPERTENSION: ICD-10-CM

## 2023-09-14 DIAGNOSIS — E53.8 B12 DEFICIENCY: ICD-10-CM

## 2023-09-14 DIAGNOSIS — F33.1 MODERATE EPISODE OF RECURRENT MAJOR DEPRESSIVE DISORDER (HCC): ICD-10-CM

## 2023-09-14 DIAGNOSIS — N52.9 ERECTILE DYSFUNCTION, UNSPECIFIED ERECTILE DYSFUNCTION TYPE: ICD-10-CM

## 2023-09-14 DIAGNOSIS — E78.2 MIXED HYPERLIPIDEMIA: ICD-10-CM

## 2023-09-14 DIAGNOSIS — R53.83 FATIGUE, UNSPECIFIED TYPE: ICD-10-CM

## 2023-09-14 DIAGNOSIS — E66.01 CLASS 3 SEVERE OBESITY DUE TO EXCESS CALORIES WITH SERIOUS COMORBIDITY AND BODY MASS INDEX (BMI) OF 50.0 TO 59.9 IN ADULT (HCC): Primary | ICD-10-CM

## 2023-09-14 DIAGNOSIS — E55.9 VITAMIN D DEFICIENCY: ICD-10-CM

## 2023-09-14 LAB
VITAMIN B-12: > 1000 PG/ML (ref 239–931)
VITAMIN D 25-HYDROXY: 35.9 NG/ML (ref 30–100)

## 2023-09-14 RX ORDER — CYANOCOBALAMIN 1000 UG/ML
INJECTION, SOLUTION INTRAMUSCULAR; SUBCUTANEOUS
Qty: 4 ML | Refills: 0 | Status: SHIPPED | OUTPATIENT
Start: 2023-09-14

## 2023-09-14 RX ADMIN — CYANOCOBALAMIN 1000 MCG: 1000 INJECTION, SOLUTION INTRAMUSCULAR; SUBCUTANEOUS at 07:44

## 2023-09-14 NOTE — PROGRESS NOTES
[address] [facility] [place]  [address] [facility], [address] [facility] [place], [address]  Dept: [phone]  Dept Fax: [phone]    Date of Service:  9/14/2023    Radhika Terry is a 44 y.o. male who presents in office today with Self. Chief Complaint   Patient presents with    Medication Check     Here to f/u on Adipex states has noticed not as effective, appetite has increased no longer curbs appetite. B12 injections only lasts for a couple days and then starts to feel sluggish again        Diagnoses / Plan:   1. Class 3 severe obesity due to excess calories with serious comorbidity and body mass index (BMI) of 50.0 to 59.9 in adult (HCC)  -     cyanocobalamin 1000 MCG/ML injection; inject 1 milliliter intramuscularly every week, Disp-4 mL, R-0Normal  -     Vitamin B12  -     Vitamin D 25 Hydroxy  2. B12 deficiency  Comments:  Symptoms improving, continue B12 injections. Orders:  -     cyanocobalamin 1000 MCG/ML injection; inject 1 milliliter intramuscularly every week, Disp-4 mL, R-0Normal  -     Vitamin B12  3. Essential hypertension  4. Mixed hyperlipidemia  5. Vitamin D deficiency  -     Vitamin D 25 Hydroxy  6. Erectile dysfunction, unspecified erectile dysfunction type  -     Testosterone  7. Fatigue, unspecified type  -     cyanocobalamin 1000 MCG/ML injection; inject 1 milliliter intramuscularly every week, Disp-4 mL, R-0Normal  -     Vitamin B12  -     Vitamin D 25 Hydroxy  -     Testosterone  8. Moderate episode of recurrent major depressive disorder ([address])     Offered reassurance and discussed following a healthy diet and increase exercise. Reviewed specific dietary changes that can assist with weight loss efforts. Explained weight gain is commonly due to poor dietary choices, deconditioning of the body and genetics. Advised to exercise for 30 minutes a day, 5 days a week. Start Contrave as discussed.  Reviewed medication desired effects, potential side effects, and how to take

## 2023-09-14 NOTE — PROGRESS NOTES
After obtaining consent, and per orders of Dr. Hyun Garza, injection of b12 given in Left deltoid by Andria Zarate MA. Patient instructed to remain in clinic for 20 minutes afterwards, and to report any adverse reaction to me immediately.

## 2023-09-15 LAB — TESTOSTERONE TOTAL: 333 NG/DL (ref 220–1000)

## 2023-09-21 ENCOUNTER — NURSE ONLY (OUTPATIENT)
Dept: FAMILY MEDICINE CLINIC | Age: 43
End: 2023-09-21
Payer: COMMERCIAL

## 2023-09-21 DIAGNOSIS — E55.9 VITAMIN D DEFICIENCY: Primary | ICD-10-CM

## 2023-09-21 PROCEDURE — 96372 THER/PROPH/DIAG INJ SC/IM: CPT | Performed by: NURSE PRACTITIONER

## 2023-09-21 RX ORDER — CYANOCOBALAMIN 1000 UG/ML
1000 INJECTION, SOLUTION INTRAMUSCULAR; SUBCUTANEOUS ONCE
Status: COMPLETED | OUTPATIENT
Start: 2023-09-21 | End: 2023-09-21

## 2023-09-21 RX ADMIN — CYANOCOBALAMIN 1000 MCG: 1000 INJECTION, SOLUTION INTRAMUSCULAR; SUBCUTANEOUS at 15:00

## 2023-09-28 ENCOUNTER — OFFICE VISIT (OUTPATIENT)
Dept: FAMILY MEDICINE CLINIC | Age: 43
End: 2023-09-28
Payer: COMMERCIAL

## 2023-09-28 VITALS
BODY MASS INDEX: 53 KG/M2 | WEIGHT: 315 LBS | SYSTOLIC BLOOD PRESSURE: 136 MMHG | OXYGEN SATURATION: 96 % | HEART RATE: 83 BPM | DIASTOLIC BLOOD PRESSURE: 70 MMHG

## 2023-09-28 DIAGNOSIS — E78.2 MIXED HYPERLIPIDEMIA: ICD-10-CM

## 2023-09-28 DIAGNOSIS — E66.01 CLASS 3 SEVERE OBESITY DUE TO EXCESS CALORIES WITH SERIOUS COMORBIDITY AND BODY MASS INDEX (BMI) OF 50.0 TO 59.9 IN ADULT (HCC): Primary | ICD-10-CM

## 2023-09-28 DIAGNOSIS — I10 ESSENTIAL HYPERTENSION: ICD-10-CM

## 2023-09-28 DIAGNOSIS — R73.01 IMPAIRED FASTING GLUCOSE: ICD-10-CM

## 2023-09-28 PROCEDURE — 3074F SYST BP LT 130 MM HG: CPT | Performed by: NURSE PRACTITIONER

## 2023-09-28 PROCEDURE — G8427 DOCREV CUR MEDS BY ELIG CLIN: HCPCS | Performed by: NURSE PRACTITIONER

## 2023-09-28 PROCEDURE — 3078F DIAST BP <80 MM HG: CPT | Performed by: NURSE PRACTITIONER

## 2023-09-28 PROCEDURE — 99213 OFFICE O/P EST LOW 20 MIN: CPT | Performed by: NURSE PRACTITIONER

## 2023-09-28 PROCEDURE — G8417 CALC BMI ABV UP PARAM F/U: HCPCS | Performed by: NURSE PRACTITIONER

## 2023-09-28 PROCEDURE — 4004F PT TOBACCO SCREEN RCVD TLK: CPT | Performed by: NURSE PRACTITIONER

## 2023-09-30 ASSESSMENT — ENCOUNTER SYMPTOMS: RESPIRATORY NEGATIVE: 1

## 2023-10-08 ASSESSMENT — ENCOUNTER SYMPTOMS
CONSTIPATION: 0
ABDOMINAL PAIN: 0
EYES NEGATIVE: 1
NAUSEA: 0
WHEEZING: 0
DIARRHEA: 0
COUGH: 0
SHORTNESS OF BREATH: 0

## 2023-10-14 DIAGNOSIS — E66.01 CLASS 3 SEVERE OBESITY DUE TO EXCESS CALORIES WITH SERIOUS COMORBIDITY AND BODY MASS INDEX (BMI) OF 50.0 TO 59.9 IN ADULT (HCC): ICD-10-CM

## 2023-10-14 DIAGNOSIS — E53.8 B12 DEFICIENCY: ICD-10-CM

## 2023-10-14 DIAGNOSIS — R53.83 FATIGUE, UNSPECIFIED TYPE: ICD-10-CM

## 2023-10-16 NOTE — TELEPHONE ENCOUNTER
Eliane Mares is calling to request a refill on the following medication(s):  Requested Prescriptions     Pending Prescriptions Disp Refills    cyanocobalamin 1000 MCG/ML injection [Pharmacy Med Name: CYANOCOBALAMIN 1,000 MCG/ML VL] 4 mL 0     Sig: INJECT 1ML INTO THE MUSCLE EVERY WEEK       Last Visit Date (If Applicable):  5/38/6967    Next Visit Date:    10/26/2023
88

## 2023-10-19 DIAGNOSIS — E53.8 B12 DEFICIENCY: Primary | ICD-10-CM

## 2023-10-19 RX ORDER — CYANOCOBALAMIN 1000 UG/ML
1000 INJECTION, SOLUTION INTRAMUSCULAR; SUBCUTANEOUS
Status: SHIPPED | OUTPATIENT
Start: 2023-10-19 | End: 2024-04-16

## 2023-10-19 RX ORDER — CYANOCOBALAMIN 1000 UG/ML
INJECTION, SOLUTION INTRAMUSCULAR; SUBCUTANEOUS
Qty: 4 ML | Refills: 0 | OUTPATIENT
Start: 2023-10-19

## 2023-11-06 ENCOUNTER — OFFICE VISIT (OUTPATIENT)
Dept: FAMILY MEDICINE CLINIC | Age: 43
End: 2023-11-06

## 2023-11-06 VITALS
OXYGEN SATURATION: 97 % | SYSTOLIC BLOOD PRESSURE: 134 MMHG | BODY MASS INDEX: 54.12 KG/M2 | WEIGHT: 315 LBS | DIASTOLIC BLOOD PRESSURE: 84 MMHG | HEART RATE: 83 BPM

## 2023-11-06 DIAGNOSIS — E66.01 CLASS 3 SEVERE OBESITY DUE TO EXCESS CALORIES WITH SERIOUS COMORBIDITY AND BODY MASS INDEX (BMI) OF 50.0 TO 59.9 IN ADULT (HCC): ICD-10-CM

## 2023-11-06 DIAGNOSIS — I10 ESSENTIAL HYPERTENSION: ICD-10-CM

## 2023-11-06 DIAGNOSIS — R63.5 EXCESSIVE WEIGHT GAIN: Primary | ICD-10-CM

## 2023-11-06 RX ADMIN — CYANOCOBALAMIN 1000 MCG: 1000 INJECTION, SOLUTION INTRAMUSCULAR; SUBCUTANEOUS at 09:00

## 2023-11-06 ASSESSMENT — ENCOUNTER SYMPTOMS
SHORTNESS OF BREATH: 0
WHEEZING: 0

## 2023-11-06 NOTE — PROGRESS NOTES
[address] [facility] [place]  [address] [facility], [address] [facility] [place], [address]  Dept: [phone]  Dept Fax: [phone]    Date of Service:  11/6/2023    Pauline Prieto is a 44 y.o. male who comes in today for follow up of chronic health issues. Chief Complaint   Patient presents with    Medication Check     2 mo f/u, go over lab results, Victoza was denied by insurance        Diagnoses / Plan:   1. Excessive weight gain  -     Phentermine-Topiramate 3.75-23 MG CP24; Take 1 tablet by mouth daily for 14 days. Max Daily Amount: 1 tablet, Disp-14 capsule, R-0Normal  2. Essential hypertension  -     Phentermine-Topiramate 3.75-23 MG CP24; Take 1 tablet by mouth daily for 14 days. Max Daily Amount: 1 tablet, Disp-14 capsule, R-0Normal  3. Class 3 severe obesity due to excess calories with serious comorbidity and body mass index (BMI) of 50.0 to 59.9 in adult ([address])  -     Phentermine-Topiramate 3.75-23 MG CP24; Take 1 tablet by mouth daily for 14 days. Max Daily Amount: 1 tablet, Disp-14 capsule, R-0Normal     Start Qsymia as discussed. Reviewed medication desired effects, potential side effects, and how to take the medication. Nonpharmacological interventions such as following a diet low-carb, low-salt diet, increased vegetables and fruit discussed. Educated on importance of physical activity. Patient verbalizes understanding regarding plan of care and all questions answered. Encouraged healthy diet and routine exercise. Instructed to continue current medications. All patient questions answered. Patient voiced understanding. Return in about 4 weeks (around 12/4/2023) for weight loss. Subjective:   History of Present Illness:  Presents the office to review recent lab results and further discuss weight loss options. Patient has tried multiple weight loss options and has struggled with getting insurance approval.  He tried Adipex, but not effective.   Most recently Victoza

## 2023-11-16 ENCOUNTER — NURSE ONLY (OUTPATIENT)
Dept: FAMILY MEDICINE CLINIC | Age: 43
End: 2023-11-16

## 2023-11-16 VITALS — WEIGHT: 315 LBS | BODY MASS INDEX: 53.32 KG/M2

## 2023-11-16 DIAGNOSIS — I10 ESSENTIAL HYPERTENSION: ICD-10-CM

## 2023-11-16 DIAGNOSIS — E66.01 CLASS 3 SEVERE OBESITY DUE TO EXCESS CALORIES WITH SERIOUS COMORBIDITY AND BODY MASS INDEX (BMI) OF 50.0 TO 59.9 IN ADULT (HCC): Primary | ICD-10-CM

## 2023-11-16 DIAGNOSIS — E66.01 CLASS 3 SEVERE OBESITY DUE TO EXCESS CALORIES WITH SERIOUS COMORBIDITY AND BODY MASS INDEX (BMI) OF 50.0 TO 59.9 IN ADULT (HCC): ICD-10-CM

## 2023-11-16 DIAGNOSIS — R63.5 EXCESSIVE WEIGHT GAIN: ICD-10-CM

## 2023-11-16 NOTE — TELEPHONE ENCOUNTER
Pt wondering about increase in dosage.  According to medication dosing after initial trial of 3.75 for 14 days dosage should be increased to  dose of 7.5

## 2023-11-16 NOTE — PROGRESS NOTES
Pt here for weight check. Is down 6 lbs. States rx was for only 14 days and would like to continue medication. Also wondering if dosage will need increased at some point or if will stay at this dosage. States out of pocket cost is $100 which is feasible.

## 2024-01-15 ENCOUNTER — OFFICE VISIT (OUTPATIENT)
Dept: FAMILY MEDICINE CLINIC | Age: 44
End: 2024-01-15
Payer: COMMERCIAL

## 2024-01-15 VITALS
OXYGEN SATURATION: 96 % | WEIGHT: 315 LBS | HEART RATE: 90 BPM | DIASTOLIC BLOOD PRESSURE: 80 MMHG | BODY MASS INDEX: 55.04 KG/M2 | SYSTOLIC BLOOD PRESSURE: 144 MMHG

## 2024-01-15 DIAGNOSIS — E66.01 CLASS 3 SEVERE OBESITY DUE TO EXCESS CALORIES WITH SERIOUS COMORBIDITY AND BODY MASS INDEX (BMI) OF 50.0 TO 59.9 IN ADULT (HCC): Primary | ICD-10-CM

## 2024-01-15 DIAGNOSIS — R73.01 IMPAIRED FASTING GLUCOSE: ICD-10-CM

## 2024-01-15 DIAGNOSIS — I10 ESSENTIAL HYPERTENSION: ICD-10-CM

## 2024-01-15 LAB — HBA1C MFR BLD: 5.1 %

## 2024-01-15 PROCEDURE — 83036 HEMOGLOBIN GLYCOSYLATED A1C: CPT | Performed by: NURSE PRACTITIONER

## 2024-01-15 PROCEDURE — G8427 DOCREV CUR MEDS BY ELIG CLIN: HCPCS | Performed by: NURSE PRACTITIONER

## 2024-01-15 PROCEDURE — G8484 FLU IMMUNIZE NO ADMIN: HCPCS | Performed by: NURSE PRACTITIONER

## 2024-01-15 PROCEDURE — G8417 CALC BMI ABV UP PARAM F/U: HCPCS | Performed by: NURSE PRACTITIONER

## 2024-01-15 PROCEDURE — 99214 OFFICE O/P EST MOD 30 MIN: CPT | Performed by: NURSE PRACTITIONER

## 2024-01-15 PROCEDURE — 3077F SYST BP >= 140 MM HG: CPT | Performed by: NURSE PRACTITIONER

## 2024-01-15 PROCEDURE — 3079F DIAST BP 80-89 MM HG: CPT | Performed by: NURSE PRACTITIONER

## 2024-01-15 PROCEDURE — 4004F PT TOBACCO SCREEN RCVD TLK: CPT | Performed by: NURSE PRACTITIONER

## 2024-01-15 ASSESSMENT — PATIENT HEALTH QUESTIONNAIRE - PHQ9
SUM OF ALL RESPONSES TO PHQ QUESTIONS 1-9: 1
9. THOUGHTS THAT YOU WOULD BE BETTER OFF DEAD, OR OF HURTING YOURSELF: 0
SUM OF ALL RESPONSES TO PHQ QUESTIONS 1-9: 1
2. FEELING DOWN, DEPRESSED OR HOPELESS: 0
10. IF YOU CHECKED OFF ANY PROBLEMS, HOW DIFFICULT HAVE THESE PROBLEMS MADE IT FOR YOU TO DO YOUR WORK, TAKE CARE OF THINGS AT HOME, OR GET ALONG WITH OTHER PEOPLE: 0
6. FEELING BAD ABOUT YOURSELF - OR THAT YOU ARE A FAILURE OR HAVE LET YOURSELF OR YOUR FAMILY DOWN: 0
SUM OF ALL RESPONSES TO PHQ QUESTIONS 1-9: 1
5. POOR APPETITE OR OVEREATING: 1
SUM OF ALL RESPONSES TO PHQ9 QUESTIONS 1 & 2: 0
SUM OF ALL RESPONSES TO PHQ QUESTIONS 1-9: 1
1. LITTLE INTEREST OR PLEASURE IN DOING THINGS: 0
7. TROUBLE CONCENTRATING ON THINGS, SUCH AS READING THE NEWSPAPER OR WATCHING TELEVISION: 0
8. MOVING OR SPEAKING SO SLOWLY THAT OTHER PEOPLE COULD HAVE NOTICED. OR THE OPPOSITE, BEING SO FIGETY OR RESTLESS THAT YOU HAVE BEEN MOVING AROUND A LOT MORE THAN USUAL: 0
4. FEELING TIRED OR HAVING LITTLE ENERGY: 0

## 2024-01-15 NOTE — PROGRESS NOTES
[address], [address]  [place] [id number]  Dept: [phone]  Dept Fax: [phone]    Date of Service:  1/15/2024    Owen Interiano is a 43 y.o. male who comes in today for follow up of chronic health issues.     Chief Complaint   Patient presents with    Obesity     Here to discuss weight loss options        Diagnoses / Plan:   1. Class 3 severe obesity due to excess calories with serious comorbidity and body mass index (BMI) of 50.0 to 59.9 in adult (HCC)  -     POCT glycosylated hemoglobin (Hb A1C)  -     Tirzepatide-Weight Management 2.5 MG/0.5ML SOAJ; Inject 2.5 mg into the skin once a week, Disp-2 mL, R-0Normal (Patient not taking: Reported on 1/19/2024)  2. Impaired fasting glucose  Assessment & Plan:  Hemoglobin A1C   Date Value Ref Range Status   01/15/2024 5.1 % Final     Orders:  -     POCT glycosylated hemoglobin (Hb A1C)  -     Tirzepatide-Weight Management 2.5 MG/0.5ML SOAJ; Inject 2.5 mg into the skin once a week, Disp-2 mL, R-0Normal (Patient not taking: Reported on 1/19/2024)  3. Essential hypertension  -     Tirzepatide-Weight Management 2.5 MG/0.5ML SOAJ; Inject 2.5 mg into the skin once a week, Disp-2 mL, R-0Normal (Patient not taking: Reported on 1/19/2024)     Explained medication options to assist in weight loss efforts have nearly been exhausted. At this point, recommend tirzepatide (Zepbound) weekly injection. There may be a discount card available. If this is not a covered option, recommend using a compound pharmacy. Discussed medication desired effects, potential side effects, and how to administer the medication.    Discussed following a healthy diet and increase exercise.  Reviewed specific dietary changes that can assist with weight loss efforts. Explained weight gain is commonly due to poor dietary choices, deconditioning of the body and genetics.  Advised to exercise for 30 minutes a day, 5 days a week.     Return in about

## 2024-01-19 ENCOUNTER — OFFICE VISIT (OUTPATIENT)
Dept: FAMILY MEDICINE CLINIC | Age: 44
End: 2024-01-19
Payer: COMMERCIAL

## 2024-01-19 VITALS
RESPIRATION RATE: 20 BRPM | OXYGEN SATURATION: 99 % | BODY MASS INDEX: 41.75 KG/M2 | WEIGHT: 315 LBS | DIASTOLIC BLOOD PRESSURE: 82 MMHG | HEART RATE: 94 BPM | HEIGHT: 73 IN | SYSTOLIC BLOOD PRESSURE: 164 MMHG | TEMPERATURE: 99 F

## 2024-01-19 DIAGNOSIS — E66.01 CLASS 3 SEVERE OBESITY DUE TO EXCESS CALORIES WITH SERIOUS COMORBIDITY AND BODY MASS INDEX (BMI) OF 50.0 TO 59.9 IN ADULT (HCC): ICD-10-CM

## 2024-01-19 DIAGNOSIS — E16.2 HYPOGLYCEMIA: Primary | ICD-10-CM

## 2024-01-19 PROCEDURE — 99212 OFFICE O/P EST SF 10 MIN: CPT | Performed by: NURSE PRACTITIONER

## 2024-01-19 PROCEDURE — G8484 FLU IMMUNIZE NO ADMIN: HCPCS | Performed by: NURSE PRACTITIONER

## 2024-01-19 PROCEDURE — 4004F PT TOBACCO SCREEN RCVD TLK: CPT | Performed by: NURSE PRACTITIONER

## 2024-01-19 PROCEDURE — G8427 DOCREV CUR MEDS BY ELIG CLIN: HCPCS | Performed by: NURSE PRACTITIONER

## 2024-01-19 PROCEDURE — 3078F DIAST BP <80 MM HG: CPT | Performed by: NURSE PRACTITIONER

## 2024-01-19 PROCEDURE — 3077F SYST BP >= 140 MM HG: CPT | Performed by: NURSE PRACTITIONER

## 2024-01-19 PROCEDURE — G8417 CALC BMI ABV UP PARAM F/U: HCPCS | Performed by: NURSE PRACTITIONER

## 2024-01-19 SDOH — ECONOMIC STABILITY: FOOD INSECURITY: WITHIN THE PAST 12 MONTHS, YOU WORRIED THAT YOUR FOOD WOULD RUN OUT BEFORE YOU GOT MONEY TO BUY MORE.: NEVER TRUE

## 2024-01-19 SDOH — ECONOMIC STABILITY: INCOME INSECURITY: HOW HARD IS IT FOR YOU TO PAY FOR THE VERY BASICS LIKE FOOD, HOUSING, MEDICAL CARE, AND HEATING?: NOT VERY HARD

## 2024-01-19 SDOH — ECONOMIC STABILITY: FOOD INSECURITY: WITHIN THE PAST 12 MONTHS, THE FOOD YOU BOUGHT JUST DIDN'T LAST AND YOU DIDN'T HAVE MONEY TO GET MORE.: NEVER TRUE

## 2024-01-19 SDOH — ECONOMIC STABILITY: HOUSING INSECURITY
IN THE LAST 12 MONTHS, WAS THERE A TIME WHEN YOU DID NOT HAVE A STEADY PLACE TO SLEEP OR SLEPT IN A SHELTER (INCLUDING NOW)?: NO

## 2024-01-19 ASSESSMENT — PATIENT HEALTH QUESTIONNAIRE - PHQ9
6. FEELING BAD ABOUT YOURSELF - OR THAT YOU ARE A FAILURE OR HAVE LET YOURSELF OR YOUR FAMILY DOWN: 0
SUM OF ALL RESPONSES TO PHQ9 QUESTIONS 1 & 2: 0
4. FEELING TIRED OR HAVING LITTLE ENERGY: 3
SUM OF ALL RESPONSES TO PHQ QUESTIONS 1-9: 8
SUM OF ALL RESPONSES TO PHQ QUESTIONS 1-9: 8
9. THOUGHTS THAT YOU WOULD BE BETTER OFF DEAD, OR OF HURTING YOURSELF: 0
SUM OF ALL RESPONSES TO PHQ QUESTIONS 1-9: 8
10. IF YOU CHECKED OFF ANY PROBLEMS, HOW DIFFICULT HAVE THESE PROBLEMS MADE IT FOR YOU TO DO YOUR WORK, TAKE CARE OF THINGS AT HOME, OR GET ALONG WITH OTHER PEOPLE: 1
8. MOVING OR SPEAKING SO SLOWLY THAT OTHER PEOPLE COULD HAVE NOTICED. OR THE OPPOSITE, BEING SO FIGETY OR RESTLESS THAT YOU HAVE BEEN MOVING AROUND A LOT MORE THAN USUAL: 0
5. POOR APPETITE OR OVEREATING: 1
1. LITTLE INTEREST OR PLEASURE IN DOING THINGS: 0
2. FEELING DOWN, DEPRESSED OR HOPELESS: 0
7. TROUBLE CONCENTRATING ON THINGS, SUCH AS READING THE NEWSPAPER OR WATCHING TELEVISION: 1
SUM OF ALL RESPONSES TO PHQ QUESTIONS 1-9: 8
3. TROUBLE FALLING OR STAYING ASLEEP: 3

## 2024-01-19 NOTE — PROGRESS NOTES
Sangeeta Cortez, APRN-CNP  [address]  [address]  [phone]        1/19/2024     Owen Interiano is a 43 y.o. male, here for evaluation of the following medical concerns:    Chief Complaint   Patient presents with    Obesity     Requesting 2nd opinion on weight management        HPI  Owen is a former patient of mine when I practiced at [place], [place].  He is requesting a second opinion regarding diabetes and weight loss.  He has noted feeling poorly after eating even a low carb meal.  He just feels out of it and has blurring of vision at times.  He recently saw Nunu Jacobson who is his PCP.  His A1C was 5.1 a few days ago.  It was recommended he start on Zepbound/Mounjaro.  He has several questions regarding his current symptoms. His feet have began cracking open again from his weight gain.  Patient Active Problem List   Diagnosis    Class 3 severe obesity due to excess calories with serious comorbidity and body mass index (BMI) of 50.0 to 59.9 in adult (HCC)    Obsessive-compulsive disorder    Severe episode of recurrent major depressive disorder (HCC)    Anxiety, generalized    Thoracic outlet syndrome    Spinal stenosis    Acute left-sided low back pain with left-sided sciatica    Essential hypertension    Mixed hyperlipidemia    Nicotine dependence, chewing tobacco, uncomplicated    Multiple joint pain    Fatigue    Vitamin D deficiency    Moderate episode of recurrent major depressive disorder (HCC)    Erectile dysfunction    Hyperkalemia    Chronic bilateral low back pain with right-sided sciatica    Right inguinal pain    Post-COVID chronic fatigue    Impaired fasting glucose    B12 deficiency       Patient's recent lab reports are as follows:      Results for orders placed or performed in visit on 01/15/24   POCT glycosylated hemoglobin (Hb A1C)   Result Value Ref Range    Hemoglobin A1C 5.1 %     Other review of systems are as noted below.    Preventative

## 2024-01-22 ASSESSMENT — ENCOUNTER SYMPTOMS
VOMITING: 0
NAUSEA: 0
WHEEZING: 0
COUGH: 0
DIARRHEA: 0

## 2024-01-27 ASSESSMENT — ENCOUNTER SYMPTOMS: RESPIRATORY NEGATIVE: 1

## 2024-02-12 ENCOUNTER — OFFICE VISIT (OUTPATIENT)
Dept: FAMILY MEDICINE CLINIC | Age: 44
End: 2024-02-12
Payer: COMMERCIAL

## 2024-02-12 VITALS
BODY MASS INDEX: 59.08 KG/M2 | OXYGEN SATURATION: 98 % | HEART RATE: 81 BPM | WEIGHT: 315 LBS | SYSTOLIC BLOOD PRESSURE: 136 MMHG | DIASTOLIC BLOOD PRESSURE: 70 MMHG

## 2024-02-12 DIAGNOSIS — E66.01 CLASS 3 SEVERE OBESITY DUE TO EXCESS CALORIES WITH SERIOUS COMORBIDITY AND BODY MASS INDEX (BMI) OF 50.0 TO 59.9 IN ADULT (HCC): Primary | ICD-10-CM

## 2024-02-12 DIAGNOSIS — I10 ESSENTIAL HYPERTENSION: ICD-10-CM

## 2024-02-12 PROCEDURE — G8484 FLU IMMUNIZE NO ADMIN: HCPCS | Performed by: NURSE PRACTITIONER

## 2024-02-12 PROCEDURE — G8427 DOCREV CUR MEDS BY ELIG CLIN: HCPCS | Performed by: NURSE PRACTITIONER

## 2024-02-12 PROCEDURE — 99213 OFFICE O/P EST LOW 20 MIN: CPT | Performed by: NURSE PRACTITIONER

## 2024-02-12 PROCEDURE — 3075F SYST BP GE 130 - 139MM HG: CPT | Performed by: NURSE PRACTITIONER

## 2024-02-12 PROCEDURE — 4004F PT TOBACCO SCREEN RCVD TLK: CPT | Performed by: NURSE PRACTITIONER

## 2024-02-12 PROCEDURE — G8417 CALC BMI ABV UP PARAM F/U: HCPCS | Performed by: NURSE PRACTITIONER

## 2024-02-12 PROCEDURE — 3078F DIAST BP <80 MM HG: CPT | Performed by: NURSE PRACTITIONER

## 2024-02-12 NOTE — PROGRESS NOTES
to get  Mounjaro 5/11/2023; denied by insurance    BP Readings from Last 3 Encounters:   02/12/24 136/70   01/19/24 (!) 164/82   01/15/24 (!) 144/80       Pulse Readings from Last 3 Encounters:   02/12/24 81   01/19/24 94   01/15/24 90        Wt Readings from Last 3 Encounters:   02/12/24 (!) 205.5 kg (453 lb)   01/19/24 (!) 205 kg (452 lb)   01/15/24 (!) 205.1 kg (452 lb 3.2 oz)        Current Outpatient Medications   Medication Sig Dispense Refill    Semaglutide,0.25 or 0.5MG/DOS, 2 MG/1.5ML SOPN Inject 0.25 mg into the skin once a week 1.5 mL 1    cyanocobalamin 1000 MCG/ML injection inject 1 milliliter intramuscularly every week 4 mL 0    hydroCHLOROthiazide (HYDRODIURIL) 12.5 MG tablet Take 1 tablet by mouth daily 90 tablet 3    oxybutynin (DITROPAN-XL) 10 MG extended release tablet       venlafaxine (EFFEXOR XR) 150 MG extended release capsule take 1 capsule by mouth once daily 90 capsule 2    carvedilol (COREG) 3.125 MG tablet take 1 tablet by mouth twice a day with meals 180 tablet 2    sildenafil (VIAGRA) 100 MG tablet Take 1 tablet by mouth as needed for Erectile Dysfunction 30 tablet 0    Blood Pressure KIT 1 Units by Does not apply route 2 times daily 1 kit 0     Current Facility-Administered Medications   Medication Dose Route Frequency Provider Last Rate Last Admin    cyanocobalamin injection 1,000 mcg  1,000 mcg IntraMUSCular Q30 Days Nunu Jacobson APRN - CNP   1,000 mcg at 11/06/23 0900     Review of Systems   Constitutional:  Positive for appetite change (fluctuates) and fatigue.   HENT: Negative.     Respiratory: Negative.     Cardiovascular: Negative.    Gastrointestinal: Negative.    Psychiatric/Behavioral:  Positive for dysphoric mood. The patient is nervous/anxious.             1/19/2024     3:53 PM 1/15/2024     2:13 PM 6/30/2023     8:26 PM 5/4/2022    10:30 AM 12/31/2021    11:26 AM 6/10/2020     5:03 PM 10/11/2018     4:32 PM   PHQ Scores   PHQ2 Score 0 0 1 1 0 1 0   PHQ9 Score 8 1 3 2 0

## 2024-02-22 ASSESSMENT — ENCOUNTER SYMPTOMS
GASTROINTESTINAL NEGATIVE: 1
RESPIRATORY NEGATIVE: 1

## 2024-03-04 ENCOUNTER — NURSE ONLY (OUTPATIENT)
Dept: FAMILY MEDICINE CLINIC | Age: 44
End: 2024-03-04

## 2024-03-04 DIAGNOSIS — E66.01 CLASS 3 SEVERE OBESITY DUE TO EXCESS CALORIES WITH SERIOUS COMORBIDITY AND BODY MASS INDEX (BMI) OF 50.0 TO 59.9 IN ADULT (HCC): Primary | ICD-10-CM

## 2024-03-04 PROCEDURE — 99999 PR OFFICE/OUTPT VISIT,PROCEDURE ONLY: CPT | Performed by: NURSE PRACTITIONER

## 2024-03-04 NOTE — PROGRESS NOTES
Pt stopped in for weight check reports doing well with compounded medication. Would like next dose sent to [facility].

## 2024-03-05 VITALS — BODY MASS INDEX: 57.46 KG/M2 | WEIGHT: 315 LBS

## 2024-03-05 DIAGNOSIS — E66.01 CLASS 3 SEVERE OBESITY DUE TO EXCESS CALORIES WITH SERIOUS COMORBIDITY AND BODY MASS INDEX (BMI) OF 50.0 TO 59.9 IN ADULT (HCC): ICD-10-CM

## 2024-03-05 DIAGNOSIS — I10 ESSENTIAL HYPERTENSION: ICD-10-CM

## 2024-04-03 ENCOUNTER — NURSE ONLY (OUTPATIENT)
Dept: FAMILY MEDICINE CLINIC | Age: 44
End: 2024-04-03

## 2024-04-03 VITALS — WEIGHT: 315 LBS | BODY MASS INDEX: 56.78 KG/M2

## 2024-04-03 DIAGNOSIS — E66.01 CLASS 3 SEVERE OBESITY DUE TO EXCESS CALORIES WITH SERIOUS COMORBIDITY AND BODY MASS INDEX (BMI) OF 50.0 TO 59.9 IN ADULT (HCC): Primary | ICD-10-CM

## 2024-04-03 NOTE — PROGRESS NOTES
Last Weight Metrics:      4/3/2024     4:05 PM 3/5/2024     4:08 PM 2/12/2024     8:11 AM 1/19/2024     3:55 PM 1/15/2024     1:43 PM 11/16/2023     9:54 AM 11/6/2023     8:05 AM   Weight Loss Metrics   Height    6' 1.425\"      Weight - Scale 435 lbs 6 oz 440 lbs 10 oz 453 lbs 452 lbs 452 lbs 3 oz 438 lbs 444 lbs 10 oz   BMI (Calculated) 0 kg/m2 0 kg/m2 0 kg/m2 59.1 kg/m2 0 kg/m2 0 kg/m2 0 kg/m2      Patient reports doing well with injections. Has last injection this week and would like to have dose increased.

## 2024-04-30 ENCOUNTER — OFFICE VISIT (OUTPATIENT)
Dept: FAMILY MEDICINE CLINIC | Age: 44
End: 2024-04-30
Payer: COMMERCIAL

## 2024-04-30 ENCOUNTER — HOSPITAL ENCOUNTER (OUTPATIENT)
Age: 44
Setting detail: SPECIMEN
Discharge: HOME | End: 2024-04-30
Payer: COMMERCIAL

## 2024-04-30 VITALS
WEIGHT: 315 LBS | OXYGEN SATURATION: 94 % | HEIGHT: 74 IN | DIASTOLIC BLOOD PRESSURE: 92 MMHG | TEMPERATURE: 98.5 F | HEART RATE: 89 BPM | SYSTOLIC BLOOD PRESSURE: 152 MMHG | RESPIRATION RATE: 16 BRPM | BODY MASS INDEX: 40.43 KG/M2

## 2024-04-30 DIAGNOSIS — R55 BLACK-OUT (NOT AMNESIA): Primary | ICD-10-CM

## 2024-04-30 DIAGNOSIS — I10 ESSENTIAL HYPERTENSION: ICD-10-CM

## 2024-04-30 LAB
BILIRUBIN, POC: ABNORMAL
BLOOD URINE, POC: ABNORMAL
CLARITY, POC: CLEAR
COLOR, POC: ABNORMAL
GLUCOSE URINE, POC: ABNORMAL
KETONES, POC: ABNORMAL
LEUKOCYTE EST, POC: ABNORMAL
NITRITE, POC: ABNORMAL
PH, POC: 6
PROTEIN, POC: ABNORMAL
SPECIFIC GRAVITY, POC: 1.03
UROBILINOGEN, POC: 0.2

## 2024-04-30 PROCEDURE — G8417 CALC BMI ABV UP PARAM F/U: HCPCS | Performed by: NURSE PRACTITIONER

## 2024-04-30 PROCEDURE — 93000 ELECTROCARDIOGRAM COMPLETE: CPT | Performed by: NURSE PRACTITIONER

## 2024-04-30 PROCEDURE — 3077F SYST BP >= 140 MM HG: CPT | Performed by: NURSE PRACTITIONER

## 2024-04-30 PROCEDURE — 80307 DRUG TEST PRSMV CHEM ANLYZR: CPT

## 2024-04-30 PROCEDURE — 81015 MICROSCOPIC EXAM OF URINE: CPT

## 2024-04-30 PROCEDURE — 81002 URINALYSIS NONAUTO W/O SCOPE: CPT | Performed by: NURSE PRACTITIONER

## 2024-04-30 PROCEDURE — 99214 OFFICE O/P EST MOD 30 MIN: CPT | Performed by: NURSE PRACTITIONER

## 2024-04-30 PROCEDURE — 4004F PT TOBACCO SCREEN RCVD TLK: CPT | Performed by: NURSE PRACTITIONER

## 2024-04-30 PROCEDURE — G8427 DOCREV CUR MEDS BY ELIG CLIN: HCPCS | Performed by: NURSE PRACTITIONER

## 2024-04-30 PROCEDURE — 3078F DIAST BP <80 MM HG: CPT | Performed by: NURSE PRACTITIONER

## 2024-04-30 ASSESSMENT — ENCOUNTER SYMPTOMS
COUGH: 0
SORE THROAT: 0
VOMITING: 0
RHINORRHEA: 0
DIARRHEA: 0
WHEEZING: 0
SHORTNESS OF BREATH: 0
NAUSEA: 0
CHEST TIGHTNESS: 0

## 2024-04-30 ASSESSMENT — PATIENT HEALTH QUESTIONNAIRE - PHQ9
7. TROUBLE CONCENTRATING ON THINGS, SUCH AS READING THE NEWSPAPER OR WATCHING TELEVISION: SEVERAL DAYS
SUM OF ALL RESPONSES TO PHQ9 QUESTIONS 1 & 2: 0
10. IF YOU CHECKED OFF ANY PROBLEMS, HOW DIFFICULT HAVE THESE PROBLEMS MADE IT FOR YOU TO DO YOUR WORK, TAKE CARE OF THINGS AT HOME, OR GET ALONG WITH OTHER PEOPLE: SOMEWHAT DIFFICULT
8. MOVING OR SPEAKING SO SLOWLY THAT OTHER PEOPLE COULD HAVE NOTICED. OR THE OPPOSITE, BEING SO FIGETY OR RESTLESS THAT YOU HAVE BEEN MOVING AROUND A LOT MORE THAN USUAL: SEVERAL DAYS
4. FEELING TIRED OR HAVING LITTLE ENERGY: NOT AT ALL
2. FEELING DOWN, DEPRESSED OR HOPELESS: NOT AT ALL
6. FEELING BAD ABOUT YOURSELF - OR THAT YOU ARE A FAILURE OR HAVE LET YOURSELF OR YOUR FAMILY DOWN: NOT AT ALL
3. TROUBLE FALLING OR STAYING ASLEEP: NOT AT ALL
1. LITTLE INTEREST OR PLEASURE IN DOING THINGS: NOT AT ALL
9. THOUGHTS THAT YOU WOULD BE BETTER OFF DEAD, OR OF HURTING YOURSELF: NOT AT ALL
SUM OF ALL RESPONSES TO PHQ QUESTIONS 1-9: 2
5. POOR APPETITE OR OVEREATING: NOT AT ALL
SUM OF ALL RESPONSES TO PHQ QUESTIONS 1-9: 2

## 2024-04-30 NOTE — PATIENT INSTRUCTIONS
Have labs and xray completed.  Complete EEG.  Office will call you in the AM with further instructions regarding EEG.  Call Nunu tomorrow and set up appointment as soon as possible to be seen by her.  You should not drive until we can identify what is going on.

## 2024-04-30 NOTE — PROGRESS NOTES
no Micro    Urinalysis, Micro        Results for POC orders placed in visit on 04/30/24   POCT Urinalysis no Micro   Result Value Ref Range    Color, UA Dark Yellow     Clarity, UA clear     Glucose, UA POC Neg     Bilirubin, UA Neg     Ketones, UA Neg     Spec Grav, UA 1.030     Blood, UA POC 1+     pH, UA 6.0     Protein, UA POC Trace     Urobilinogen, UA 0.2     Leukocytes, UA Neg     Nitrite, UA Neg            Concern for underlying seizure disorder.     Plan:       EKG shows sinus rhythm with poor r wave progression.  This could be due to previous myocardial event or body habitus.  Will discuss findings with PCP.     The 10-year ASCVD risk score (Jessica RUBIO, et al., 2019) is: 3.8%    Values used to calculate the score:      Age: 44 years      Sex: Male      Is Non- : No      Diabetic: No      Tobacco smoker: No      Systolic Blood Pressure: 152 mmHg      Is BP treated: Yes      HDL Cholesterol: 31 mg/dL      Total Cholesterol: 177 mg/dL  Have labs and xray completed.  Complete EEG.  Call Nunu tomorrow and set up appointment as soon as possible to be seen by her.  You should not drive until we can identify what is going on.    At 8:10 AM requested phone call with PCP via teams.  At 8:14 AM sent phone message to PCP requesting call back.  Shows delivered.  Did not hear back from PCP.  Will route chart to her for review.     Patient Instructions   Have labs and xray completed.  Complete EEG.  Office will call you in the AM with further instructions regarding EEG.  Call Nunu tomorrow and set up appointment as soon as possible to be seen by her.  You should not drive until we can identify what is going on.  Patient/Caregiver instructed on use, benefit, and side effects of prescribed medications.  All patient/parent/caregiver questions answered.  Patient/parent/caregiver voiced understanding. Reviewed health maintenance.  Instructed to continue current medications, diet and exercise.  Patient

## 2024-05-01 DIAGNOSIS — I10 ESSENTIAL HYPERTENSION: ICD-10-CM

## 2024-05-01 DIAGNOSIS — R55 BLACK-OUT (NOT AMNESIA): ICD-10-CM

## 2024-05-01 LAB
AMORPH SED URNS QL MICRO: ABNORMAL
AMPHET UR QL SCN: NEGATIVE
BARBITURATES UR QL SCN: NEGATIVE
BENZODIAZ UR QL: NEGATIVE
CANNABINOIDS UR QL SCN: NEGATIVE
COCAINE UR QL SCN: NEGATIVE
EPI CELLS #/AREA URNS HPF: ABNORMAL /HPF (ref 0–5)
FENTANYL UR QL: NEGATIVE
METHADONE UR QL: NEGATIVE
OPIATES UR QL SCN: NEGATIVE
OXYCODONE UR QL SCN: NEGATIVE
PCP UR QL SCN: NEGATIVE
RBC #/AREA URNS HPF: ABNORMAL /HPF (ref 0–4)
TEST INFORMATION: NORMAL
WBC #/AREA URNS HPF: ABNORMAL /HPF (ref 0–4)

## 2024-05-01 NOTE — RESULT ENCOUNTER NOTE
Reviewed results with patient.  Poor r wave progression may be due to multiple factors.  Will await other labs and inform PCP.

## 2024-05-02 LAB
ALBUMIN/GLOBULIN RATIO: 1.5 G/DL
ALBUMIN: 4.4 G/DL (ref 3.5–5)
ALP BLD-CCNC: 89 UNITS/L (ref 38–126)
ALT SERPL-CCNC: 33 UNITS/L (ref 4–50)
ANION GAP SERPL CALCULATED.3IONS-SCNC: 11.6 MMOL/L (ref 3–11)
AST SERPL-CCNC: 28 UNITS/L (ref 17–59)
B-TYPE NATRIURETIC PEPTIDE: <5 PG/ML (ref 0–100)
BASOPHILS ABSOLUTE: 0.06 (ref 0–0.3)
BASOPHILS RELATIVE PERCENT: 0.75 (ref 0–3)
BILIRUB SERPL-MCNC: 0.8 MG/DL (ref 0.2–1.3)
BUN BLDV-MCNC: 17 MG/DL (ref 9–20)
CALCIUM SERPL-MCNC: 9.6 MG/DL (ref 8.4–10.2)
CHLORIDE BLD-SCNC: 108 MMOL/L (ref 98–120)
CO2: 24 MMOL/L (ref 22–31)
CREAT SERPL-MCNC: 0.9 MG/DL (ref 0.7–1.3)
EOSINOPHILS ABSOLUTE: 0.03 (ref 0–1.1)
EOSINOPHILS RELATIVE PERCENT: 0.45 (ref 0–10)
GFR, ESTIMATED: > 60
GLOBULIN: 2.9 G/DL
GLUCOSE: 115 MG/DL (ref 75–110)
HCT VFR BLD CALC: 49.6 % (ref 42–52)
HEMOGLOBIN: 16.6 (ref 13.8–17.8)
LYMPHOCYTES ABSOLUTE: 2.25 (ref 1–5.5)
LYMPHOCYTES RELATIVE PERCENT: 30.71 (ref 20–51.1)
MAGNESIUM: 2.1 MG/DL (ref 1.6–2.3)
MCH RBC QN AUTO: 29.7 PG (ref 28.5–32.5)
MCHC RBC AUTO-ENTMCNC: 33.5 G/DL (ref 32–37)
MCV RBC AUTO: 88.6 FL (ref 80–94)
MONOCYTES ABSOLUTE: 0.43 (ref 0.1–1)
MONOCYTES RELATIVE PERCENT: 5.87 (ref 1.7–9.3)
NEUTROPHILS ABSOLUTE: 4.56 (ref 2–8.1)
NEUTROPHILS RELATIVE PERCENT: 62.22 (ref 42.2–75.2)
PDW BLD-RTO: 11.7 % (ref 10–15.5)
PHOSPHORUS: 3.8 MG/DL (ref 2.5–4.5)
PLATELET # BLD: 199.6 THOU/MM3 (ref 130–400)
POTASSIUM SERPL-SCNC: 3.8 MMOL/L (ref 3.6–5)
RBC # BLD: 5.59 M/UL (ref 4.7–6.1)
SEDIMENTATION RATE, ERYTHROCYTE: 3 MM/HR (ref 0–15)
SODIUM BLD-SCNC: 143 MMOL/L (ref 135–145)
TOTAL PROTEIN: 7.3 G/DL (ref 6.3–8.2)
WBC # BLD: 7.3 THOU/ML3 (ref 4.8–10.8)

## 2024-05-03 NOTE — RESULT ENCOUNTER NOTE
Please let Telly know his labs and chest xray are all normal.  Complete EEG and follow up with Nunu.  He should not drive until cleared by Nunu Jacobson.

## 2024-05-08 ENCOUNTER — OFFICE VISIT (OUTPATIENT)
Dept: FAMILY MEDICINE CLINIC | Age: 44
End: 2024-05-08
Payer: COMMERCIAL

## 2024-05-08 VITALS
BODY MASS INDEX: 55.89 KG/M2 | WEIGHT: 315 LBS | OXYGEN SATURATION: 95 % | HEART RATE: 93 BPM | SYSTOLIC BLOOD PRESSURE: 138 MMHG | DIASTOLIC BLOOD PRESSURE: 80 MMHG

## 2024-05-08 DIAGNOSIS — M48.02 SPINAL STENOSIS OF CERVICAL REGION: ICD-10-CM

## 2024-05-08 DIAGNOSIS — R20.0 NUMBNESS AND TINGLING OF BOTH LOWER EXTREMITIES: ICD-10-CM

## 2024-05-08 DIAGNOSIS — E66.01 CLASS 3 SEVERE OBESITY DUE TO EXCESS CALORIES WITH SERIOUS COMORBIDITY AND BODY MASS INDEX (BMI) OF 50.0 TO 59.9 IN ADULT (HCC): ICD-10-CM

## 2024-05-08 DIAGNOSIS — I10 ESSENTIAL HYPERTENSION: ICD-10-CM

## 2024-05-08 DIAGNOSIS — R55 BLACK-OUT (NOT AMNESIA): Primary | ICD-10-CM

## 2024-05-08 DIAGNOSIS — M48.061 SPINAL STENOSIS OF LUMBAR REGION WITHOUT NEUROGENIC CLAUDICATION: ICD-10-CM

## 2024-05-08 DIAGNOSIS — R20.2 NUMBNESS AND TINGLING OF BOTH LOWER EXTREMITIES: ICD-10-CM

## 2024-05-08 DIAGNOSIS — E53.8 B12 DEFICIENCY: ICD-10-CM

## 2024-05-08 DIAGNOSIS — R53.83 FATIGUE, UNSPECIFIED TYPE: ICD-10-CM

## 2024-05-08 PROBLEM — G89.29 CHRONIC BILATERAL LOW BACK PAIN WITH RIGHT-SIDED SCIATICA: Status: RESOLVED | Noted: 2023-02-23 | Resolved: 2024-05-08

## 2024-05-08 PROBLEM — N52.9 ERECTILE DYSFUNCTION: Status: RESOLVED | Noted: 2022-05-15 | Resolved: 2024-05-08

## 2024-05-08 PROBLEM — G93.32 POST-COVID CHRONIC FATIGUE: Status: RESOLVED | Noted: 2023-05-26 | Resolved: 2024-05-08

## 2024-05-08 PROBLEM — E87.5 HYPERKALEMIA: Status: RESOLVED | Noted: 2022-05-15 | Resolved: 2024-05-08

## 2024-05-08 PROBLEM — U09.9 POST-COVID CHRONIC FATIGUE: Status: RESOLVED | Noted: 2023-05-26 | Resolved: 2024-05-08

## 2024-05-08 PROBLEM — R73.01 IMPAIRED FASTING GLUCOSE: Status: RESOLVED | Noted: 2023-05-26 | Resolved: 2024-05-08

## 2024-05-08 PROBLEM — R10.31 RIGHT INGUINAL PAIN: Status: RESOLVED | Noted: 2023-02-23 | Resolved: 2024-05-08

## 2024-05-08 PROBLEM — M54.41 CHRONIC BILATERAL LOW BACK PAIN WITH RIGHT-SIDED SCIATICA: Status: RESOLVED | Noted: 2023-02-23 | Resolved: 2024-05-08

## 2024-05-08 LAB — VITAMIN B-12: 287 PG/ML (ref 239–931)

## 2024-05-08 PROCEDURE — 3079F DIAST BP 80-89 MM HG: CPT | Performed by: NURSE PRACTITIONER

## 2024-05-08 PROCEDURE — G8427 DOCREV CUR MEDS BY ELIG CLIN: HCPCS | Performed by: NURSE PRACTITIONER

## 2024-05-08 PROCEDURE — 99214 OFFICE O/P EST MOD 30 MIN: CPT | Performed by: NURSE PRACTITIONER

## 2024-05-08 PROCEDURE — 4004F PT TOBACCO SCREEN RCVD TLK: CPT | Performed by: NURSE PRACTITIONER

## 2024-05-08 PROCEDURE — G8417 CALC BMI ABV UP PARAM F/U: HCPCS | Performed by: NURSE PRACTITIONER

## 2024-05-08 PROCEDURE — 3075F SYST BP GE 130 - 139MM HG: CPT | Performed by: NURSE PRACTITIONER

## 2024-05-08 RX ORDER — CYANOCOBALAMIN 1000 UG/ML
INJECTION, SOLUTION INTRAMUSCULAR; SUBCUTANEOUS
Qty: 1 ML | Refills: 5 | Status: SHIPPED | OUTPATIENT
Start: 2024-05-08

## 2024-05-08 ASSESSMENT — ENCOUNTER SYMPTOMS
BACK PAIN: 1
GASTROINTESTINAL NEGATIVE: 1
WHEEZING: 0
COUGH: 0
SHORTNESS OF BREATH: 0

## 2024-05-08 NOTE — ASSESSMENT & PLAN NOTE
Borderline controlled, continue current medications, medication adherence emphasized, and lifestyle modifications recommended. Increase water, low salt diet.

## 2024-05-08 NOTE — PROGRESS NOTES
lbs 452 lbs 3 oz   BMI (Calculated) 0 kg/m2 56.4 kg/m2 0 kg/m2 0 kg/m2 0 kg/m2 59.1 kg/m2 0 kg/m2        Current Outpatient Medications   Medication Sig Dispense Refill    Semaglutide,0.25 or 0.5MG/DOS, 2 MG/1.5ML SOPN Inject 0.5 mg into the skin once a week 1.5 mL 2    cyanocobalamin 1000 MCG/ML injection inject 1 milliliter intramuscularly every week 4 mL 0    hydroCHLOROthiazide (HYDRODIURIL) 12.5 MG tablet Take 1 tablet by mouth daily 90 tablet 3    oxybutynin (DITROPAN-XL) 10 MG extended release tablet       venlafaxine (EFFEXOR XR) 150 MG extended release capsule take 1 capsule by mouth once daily 90 capsule 2    carvedilol (COREG) 3.125 MG tablet take 1 tablet by mouth twice a day with meals 180 tablet 2    sildenafil (VIAGRA) 100 MG tablet Take 1 tablet by mouth as needed for Erectile Dysfunction 30 tablet 0    Blood Pressure KIT 1 Units by Does not apply route 2 times daily 1 kit 0     No current facility-administered medications for this visit.     Review of Systems   Constitutional:  Negative for appetite change, chills, diaphoresis, fatigue and fever.   HENT: Negative.     Respiratory:  Negative for cough, shortness of breath and wheezing.    Cardiovascular:  Negative for chest pain and leg swelling.   Gastrointestinal: Negative.    Genitourinary: Negative.    Musculoskeletal:  Positive for back pain (chronic) and neck pain (chronic).   Neurological:  Positive for numbness (tingling BLE). Negative for dizziness, light-headedness and headaches.   Psychiatric/Behavioral:  Negative for confusion, decreased concentration, dysphoric mood and sleep disturbance. The patient is not nervous/anxious.             4/30/2024     5:43 PM 1/19/2024     3:53 PM 1/15/2024     2:13 PM 6/30/2023     8:26 PM 5/4/2022    10:30 AM 12/31/2021    11:26 AM 6/10/2020     5:03 PM   PHQ Scores   PHQ2 Score 0 0 0 1 1 0 1   PHQ9 Score 2 8 1 3 2 0 1     Interpretation of Total Score Depression Severity: 1-4 = Minimal depression, 5-9 =

## 2024-05-09 ENCOUNTER — HOSPITAL ENCOUNTER (OUTPATIENT)
Dept: CT IMAGING | Age: 44
Discharge: HOME | End: 2024-05-11
Payer: COMMERCIAL

## 2024-05-09 ENCOUNTER — HOSPITAL ENCOUNTER (OUTPATIENT)
Dept: NEUROLOGY | Age: 44
Discharge: HOME | End: 2024-05-09
Payer: COMMERCIAL

## 2024-05-09 DIAGNOSIS — R55 BLACK-OUT (NOT AMNESIA): ICD-10-CM

## 2024-05-09 PROCEDURE — 70450 CT HEAD/BRAIN W/O DYE: CPT

## 2024-05-09 PROCEDURE — 95813 EEG EXTND MNTR 61-119 MIN: CPT

## 2024-05-09 NOTE — ASSESSMENT & PLAN NOTE
Uncontrolled, continue current medications, medication adherence emphasized, and lifestyle modifications recommended. Gradually improving, taking semaglutide weekly.

## 2024-05-09 NOTE — PROGRESS NOTES
EXTENDED EEG Completed with Anival Analysis.    PCP: Nunu Jacobson APRN - CNP    Ordering: Sangeeta lam NP    Interpreting Physician: Shalom Maya Neurologist    Technician: Magda Baker RN

## 2024-05-10 NOTE — PROCEDURES
[address] [place] [address]                       ELECTROENCEPHALOGRAM REPORT      PATIENT NAME: CRYSTAL COYNE             : 1980  MED REC NO: [id number]                         ROOM:   ACCOUNT NO: [id number]                       ADMIT DATE: 2024    PROVIDER: René Rausch MD      REFERRING PHYSICIAN:  ROLAND CASH    TECHNIQUE:  23 channels of EEG, 2 channels of EOG, 2 channel of EKG, and 1 channel ground and 1 channel reference were recorded with [employer] 32 channel system utilizing the International 10/20 system protocol.    Anival detection and seizure analysis protocols were utilized, and the study was reviewed using the comprehensive EEG monitoring.    This is an extended EEG recorded for 1 hour and 2 minutes.    CLINICAL DATA:      The patient is 44 years old with a history of severe depression, obesity, anxiety, fatigue, intractable migraines, and right-sided weakness.    The purpose of the study is to evaluate for associated seizure activity.    MEDICATIONS:  Effexor.    BACKGROUND:      While the patient was awake, the background activity consisted of fairly regulated low amplitude 10- to 11-hertz waveforms seen over both cerebral hemispheres, reacting to the eye opening.    ACTIVATION PROCEDURES:      HYPERVENTILATION:  Hyperventilation was performed for 3 minutes. Patient was cooperative with good effort.  Also, post-hyperventilation period was monitored closely.      Hyperventilation:  Remarkable.    PHOTIC STIMULATION:  Photic stimulation was performed at the following frequencies: 1 Hz, 3 Hz, 6 Hz, 9 Hz, 12 Hz, 15 Hz, 18 Hz, 21 Hz, 25 Hz, 30 Hz and patient tolerated well.    Photic stimulation:  Bilateral symmetric driving response noted, sleep stage 1 and 2.    EKG:  EKG showed normal sinus rhythm without any significant abnormality.  Artifacts were noted throughout this

## 2024-05-10 NOTE — RESULT ENCOUNTER NOTE
Vale, Please inform Telly his EEG did not show any seizure activity.  I recommend he follow with Nunu Jacobson, as she is his PCP.

## 2024-05-15 ENCOUNTER — NURSE ONLY (OUTPATIENT)
Dept: FAMILY MEDICINE CLINIC | Age: 44
End: 2024-05-15
Payer: COMMERCIAL

## 2024-05-15 DIAGNOSIS — E53.8 B12 DEFICIENCY: Primary | ICD-10-CM

## 2024-05-15 PROCEDURE — 96372 THER/PROPH/DIAG INJ SC/IM: CPT | Performed by: NURSE PRACTITIONER

## 2024-05-15 RX ORDER — CYANOCOBALAMIN 1000 UG/ML
1000 INJECTION, SOLUTION INTRAMUSCULAR; SUBCUTANEOUS
Status: SHIPPED | OUTPATIENT
Start: 2024-05-15 | End: 2024-10-12

## 2024-05-15 RX ADMIN — CYANOCOBALAMIN 1000 MCG: 1000 INJECTION, SOLUTION INTRAMUSCULAR; SUBCUTANEOUS at 08:29

## 2024-05-15 NOTE — PROGRESS NOTES
After obtaining consent, and per orders of Dr. Jacobson, injection of b12 given in Right deltoid by Ashley Young MA. Patient instructed to remain in clinic for 20 minutes afterwards, and to report any adverse reaction to me immediately.

## 2024-06-02 DIAGNOSIS — F33.1 MODERATE EPISODE OF RECURRENT MAJOR DEPRESSIVE DISORDER (HCC): ICD-10-CM

## 2024-06-03 RX ORDER — VENLAFAXINE HYDROCHLORIDE 150 MG/1
CAPSULE, EXTENDED RELEASE ORAL
Qty: 90 CAPSULE | Refills: 2 | Status: SHIPPED | OUTPATIENT
Start: 2024-06-03

## 2024-06-03 NOTE — TELEPHONE ENCOUNTER
Owen Interiano is calling to request a refill on the following medication(s):  Requested Prescriptions     Pending Prescriptions Disp Refills    venlafaxine (EFFEXOR XR) 150 MG extended release capsule [Pharmacy Med Name: VENLAFAXINE HCL  MG CAP] 90 capsule 2     Sig: take 1 capsule by mouth once daily       Last Visit Date (If Applicable):  5/8/2024    Next Visit Date:    Visit date not found

## 2024-06-06 DIAGNOSIS — I10 ESSENTIAL HYPERTENSION: ICD-10-CM

## 2024-06-07 RX ORDER — CARVEDILOL 3.12 MG/1
TABLET ORAL
Qty: 180 TABLET | Refills: 1 | Status: SHIPPED | OUTPATIENT
Start: 2024-06-07

## 2024-06-07 NOTE — TELEPHONE ENCOUNTER
Owen Interiano is calling to request a refill on the following medication(s):  Requested Prescriptions     Pending Prescriptions Disp Refills    carvedilol (COREG) 3.125 MG tablet [Pharmacy Med Name: CARVEDILOL 3.125 MG TABLET] 180 tablet 2     Sig: take 1 tablet by mouth twice a day with meals       Last Visit Date (If Applicable):  5/8/2024    Next Visit Date:    Visit date not found

## 2024-06-18 ENCOUNTER — NURSE ONLY (OUTPATIENT)
Dept: FAMILY MEDICINE CLINIC | Age: 44
End: 2024-06-18
Payer: COMMERCIAL

## 2024-06-18 DIAGNOSIS — E53.8 B12 DEFICIENCY: Primary | ICD-10-CM

## 2024-06-18 PROCEDURE — 96372 THER/PROPH/DIAG INJ SC/IM: CPT | Performed by: NURSE PRACTITIONER

## 2024-06-18 RX ADMIN — CYANOCOBALAMIN 1000 MCG: 1000 INJECTION, SOLUTION INTRAMUSCULAR; SUBCUTANEOUS at 08:06

## 2024-06-19 ENCOUNTER — HOSPITAL ENCOUNTER (EMERGENCY)
Age: 44
Discharge: HOME | End: 2024-06-19
Attending: FAMILY MEDICINE
Payer: COMMERCIAL

## 2024-06-19 VITALS
TEMPERATURE: 98 F | DIASTOLIC BLOOD PRESSURE: 97 MMHG | HEART RATE: 77 BPM | RESPIRATION RATE: 17 BRPM | SYSTOLIC BLOOD PRESSURE: 158 MMHG | OXYGEN SATURATION: 99 %

## 2024-06-19 DIAGNOSIS — S41.112A LACERATION OF LEFT UPPER ARM, INITIAL ENCOUNTER: Primary | ICD-10-CM

## 2024-06-19 PROCEDURE — 99284 EMERGENCY DEPT VISIT MOD MDM: CPT

## 2024-06-19 PROCEDURE — 12002 RPR S/N/AX/GEN/TRNK2.6-7.5CM: CPT

## 2024-06-19 PROCEDURE — 90471 IMMUNIZATION ADMIN: CPT | Performed by: FAMILY MEDICINE

## 2024-06-19 PROCEDURE — 2500000003 HC RX 250 WO HCPCS: Performed by: FAMILY MEDICINE

## 2024-06-19 PROCEDURE — 90715 TDAP VACCINE 7 YRS/> IM: CPT | Performed by: FAMILY MEDICINE

## 2024-06-19 PROCEDURE — 6360000002 HC RX W HCPCS: Performed by: FAMILY MEDICINE

## 2024-06-19 RX ORDER — LIDOCAINE HYDROCHLORIDE 10 MG/ML
10 INJECTION, SOLUTION INFILTRATION; PERINEURAL ONCE
Status: COMPLETED | OUTPATIENT
Start: 2024-06-19 | End: 2024-06-19

## 2024-06-19 RX ADMIN — LIDOCAINE HYDROCHLORIDE 10 ML: 10 INJECTION, SOLUTION INFILTRATION; PERINEURAL at 11:08

## 2024-06-19 RX ADMIN — TETANUS TOXOID, REDUCED DIPHTHERIA TOXOID AND ACELLULAR PERTUSSIS VACCINE, ADSORBED 0.5 ML: 5; 2.5; 8; 8; 2.5 SUSPENSION INTRAMUSCULAR at 11:08

## 2024-06-19 ASSESSMENT — ENCOUNTER SYMPTOMS
NAUSEA: 0
VOMITING: 0

## 2024-06-19 NOTE — ED NOTES
Patient in stable condition. Alert and oriented x3. Unlabored breathing present. Patient aware of plan of care. Patient discharge instructions given and explained. Follow up information instructions given.  Patient agreeable to plan of care. Patient states understanding and denies any questions or concerns. Patient ambulated out of ER with no complications.

## 2024-06-19 NOTE — ED PROVIDER NOTES
MG/1.5ML SOPN    Inject 0.5 mg into the skin once a week    SILDENAFIL (VIAGRA) 100 MG TABLET    Take 1 tablet by mouth as needed for Erectile Dysfunction    VENLAFAXINE (EFFEXOR XR) 150 MG EXTENDED RELEASE CAPSULE    take 1 capsule by mouth once daily       ALLERGIES     has No Known Allergies.    FAMILY HISTORY     He indicated that his mother is alive. He indicated that his father is alive. He indicated that the status of his maternal grandmother is unknown.   family history includes Alzheimer's Disease in his maternal grandmother; Diabetes in his father; High Blood Pressure in his father; High Cholesterol in his father and mother; Hypertension in his father; Kidney Disease in his father.    SOCIAL HISTORY      reports that he has never smoked. His smokeless tobacco use includes chew. He reports that he does not currently use alcohol. He reports that he does not use drugs.    PHYSICAL EXAM     INITIAL VITALS:  temporal temperature is 98 °F (36.7 °C). His pulse is 77. His respiration is 17 and oxygen saturation is 99%.    Physical Exam  Vitals and nursing note reviewed.   Constitutional:       Appearance: He is obese.   Musculoskeletal:         General: No swelling, tenderness or deformity. Normal range of motion.   Skin:     Capillary Refill: Capillary refill takes less than 2 seconds.      Comments: 4.5 cm laceration about 2 inches above left antecubital fossa of left arm. One layer involved.    Neurological:      General: No focal deficit present.      Mental Status: He is alert.      Sensory: No sensory deficit.          DIFFERENTIAL DIAGNOSIS:       DIAGNOSTIC RESULTS       LABS:   Labs Reviewed - No data to display    EMERGENCY DEPARTMENT COURSE:   Vitals:    Vitals:    06/19/24 1023   Pulse: 77   Resp: 17   Temp: 98 °F (36.7 °C)   TempSrc: Temporal   SpO2: 99%     Tetanus was updated.  Patient has a 4.5 cm partial-thickness laceration about 1 to 2 inches just above the antecubital fossa on the left arm.

## 2024-06-19 NOTE — DISCHARGE INSTR - COC
Continuity of Care Form    Patient Name: Owen Interiano   :  1980  MRN:  [id number]    Admit date:  2024  Discharge date:  ***    Code Status Order: Prior   Advance Directives:     Admitting Physician:  No admitting provider for patient encounter.  PCP: Nunu Jacobson APRN - CNP    Discharging Nurse: ***  Discharging Hospital Unit/Room#: 2TR/TR2  Discharging Unit Phone Number: ***    Emergency Contact:   Extended Emergency Contact Information  Primary Emergency Contact: Fabiola Interiano   [facility]  Home Phone: [phone]  Mobile Phone: [phone]  Relation: Parent    Past Surgical History:  Past Surgical History:   Procedure Laterality Date    CYSTOSCOPY N/A 10/11/2012    w/ right ureter stent @ [facility]    INGUINAL HERNIA REPAIR Right     unsure of [facility], hospital or physician    LITHOTRIPSY  10/11/2012    @ [facility]    LUMBAR LAMINECTOMY  10/31/2018    LUMBAR LAMINECTOMY  2021    Dr. Cortez       Immunization History:   Immunization History   Administered Date(s) Administered    Influenza Virus Vaccine 2017    TDaP, ADACEL (age 10y-64y), BOOSTRIX (age 10y+), IM, 0.5mL 05/15/2014, 2024       Active Problems:  Patient Active Problem List   Diagnosis Code    Class 3 severe obesity due to excess calories with serious comorbidity and body mass index (BMI) of 50.0 to 59.9 in adult ([facility]) E66.01, Z68.43    Obsessive-compulsive disorder F42.9    Anxiety, generalized F41.1    Thoracic outlet syndrome G54.0    Spinal stenosis M48.00    Acute left-sided low back pain with left-sided sciatica M54.42    Essential hypertension I10    Mixed hyperlipidemia E78.2    Nicotine dependence, chewing tobacco, uncomplicated F17.220    Multiple joint pain M25.50    Vitamin D deficiency E55.9    Moderate episode of recurrent major depressive disorder ([facility]) F33.1    B12 deficiency E53.8    Black-out (not amnesia) R55    Numbness and tingling of both lower extremities R20.0, R20.2

## 2024-06-19 NOTE — DISCHARGE INSTRUCTIONS
Watch for signs and symptoms of infection which may include redness, discharge or fever.  If the symptoms should occur notify your primary care provider or call the ED.  Suture removal in 8 to 10 days.  Review your care instructions.

## 2024-06-19 NOTE — ED TRIAGE NOTES
Pt arrival to the ER with complaint of laceration to the left upper arm from a blade cutting metal at home. Laceration to the left upper arm approx 4.5cm. Pt alert and oriented. Bleeding controlled. Wound is gapping. Pt breathing with ease. Last Tetanus over five years ago.

## 2024-07-16 ENCOUNTER — NURSE ONLY (OUTPATIENT)
Dept: FAMILY MEDICINE CLINIC | Age: 44
End: 2024-07-16
Payer: COMMERCIAL

## 2024-07-16 PROCEDURE — 96372 THER/PROPH/DIAG INJ SC/IM: CPT | Performed by: NURSE PRACTITIONER

## 2024-07-16 RX ADMIN — CYANOCOBALAMIN 1000 MCG: 1000 INJECTION, SOLUTION INTRAMUSCULAR; SUBCUTANEOUS at 14:47

## 2024-07-16 NOTE — PROGRESS NOTES
After obtaining consent, and per orders of Nunu Jacobson, injection of B12 given in Right deltoid by Tonny Ny MA. Patient tolerated well.  Patient instructed to remain in clinic for 20 minutes afterwards, and to report any adverse reaction immediately.

## 2024-08-19 DIAGNOSIS — E66.01 CLASS 3 SEVERE OBESITY DUE TO EXCESS CALORIES WITH SERIOUS COMORBIDITY AND BODY MASS INDEX (BMI) OF 50.0 TO 59.9 IN ADULT (HCC): ICD-10-CM

## 2024-08-19 DIAGNOSIS — E53.8 B12 DEFICIENCY: ICD-10-CM

## 2024-08-19 DIAGNOSIS — F33.1 MODERATE EPISODE OF RECURRENT MAJOR DEPRESSIVE DISORDER (HCC): ICD-10-CM

## 2024-08-19 DIAGNOSIS — I10 ESSENTIAL HYPERTENSION: ICD-10-CM

## 2024-08-19 DIAGNOSIS — R53.83 FATIGUE, UNSPECIFIED TYPE: ICD-10-CM

## 2024-08-19 RX ORDER — HYDROCHLOROTHIAZIDE 12.5 MG/1
12.5 TABLET ORAL DAILY
Qty: 90 TABLET | Refills: 3 | Status: SHIPPED | OUTPATIENT
Start: 2024-08-19

## 2024-08-19 RX ORDER — VENLAFAXINE HYDROCHLORIDE 150 MG/1
150 CAPSULE, EXTENDED RELEASE ORAL DAILY
Qty: 90 CAPSULE | Refills: 2 | Status: SHIPPED | OUTPATIENT
Start: 2024-08-19

## 2024-08-19 RX ORDER — CYANOCOBALAMIN 1000 UG/ML
INJECTION, SOLUTION INTRAMUSCULAR; SUBCUTANEOUS
Qty: 1 ML | Refills: 5 | Status: SHIPPED | OUTPATIENT
Start: 2024-08-19

## 2024-08-19 RX ORDER — CARVEDILOL 3.12 MG/1
TABLET ORAL
Qty: 180 TABLET | Refills: 1 | Status: SHIPPED | OUTPATIENT
Start: 2024-08-19

## 2024-08-19 NOTE — TELEPHONE ENCOUNTER
Also needs Cyanocobalamin injection 1000mcg, He has scheduled as appt for Sept 4 so if he can get short term on these until he is seen.

## 2024-08-19 NOTE — TELEPHONE ENCOUNTER
Owen Interiano is calling to request a refill on the following medication(s):  Requested Prescriptions     Pending Prescriptions Disp Refills    venlafaxine (EFFEXOR XR) 150 MG extended release capsule 90 capsule 2     Sig: Take 1 capsule by mouth daily    hydroCHLOROthiazide 12.5 MG tablet 90 tablet 3     Sig: Take 1 tablet by mouth daily    carvedilol (COREG) 3.125 MG tablet 180 tablet 1     Sig: take 1 tablet by mouth twice a day with meals    cyanocobalamin 1000 MCG/ML injection 1 mL 5     Sig: inject 1 mL intramuscularly every month.       Last Visit Date (If Applicable):  5/8/2024    Next Visit Date:    9/4/2024

## 2024-08-26 ENCOUNTER — NURSE ONLY (OUTPATIENT)
Dept: FAMILY MEDICINE CLINIC | Age: 44
End: 2024-08-26
Payer: COMMERCIAL

## 2024-08-26 DIAGNOSIS — E53.8 B12 DEFICIENCY: Primary | ICD-10-CM

## 2024-08-26 PROCEDURE — 96372 THER/PROPH/DIAG INJ SC/IM: CPT | Performed by: NURSE PRACTITIONER

## 2024-08-26 RX ADMIN — CYANOCOBALAMIN 1000 MCG: 1000 INJECTION, SOLUTION INTRAMUSCULAR; SUBCUTANEOUS at 15:51

## 2024-08-31 ENCOUNTER — HOSPITAL ENCOUNTER (EMERGENCY)
Age: 44
Discharge: HOME | End: 2024-08-31
Attending: EMERGENCY MEDICINE
Payer: COMMERCIAL

## 2024-08-31 ENCOUNTER — APPOINTMENT (OUTPATIENT)
Dept: ULTRASOUND IMAGING | Age: 44
End: 2024-08-31
Attending: EMERGENCY MEDICINE
Payer: COMMERCIAL

## 2024-08-31 ENCOUNTER — OFFICE VISIT (OUTPATIENT)
Dept: PRIMARY CARE CLINIC | Age: 44
End: 2024-08-31

## 2024-08-31 VITALS
HEART RATE: 90 BPM | WEIGHT: 315 LBS | OXYGEN SATURATION: 97 % | HEIGHT: 76 IN | DIASTOLIC BLOOD PRESSURE: 84 MMHG | SYSTOLIC BLOOD PRESSURE: 166 MMHG | TEMPERATURE: 98.5 F | BODY MASS INDEX: 38.36 KG/M2

## 2024-08-31 VITALS
HEIGHT: 76 IN | SYSTOLIC BLOOD PRESSURE: 161 MMHG | DIASTOLIC BLOOD PRESSURE: 89 MMHG | HEART RATE: 74 BPM | TEMPERATURE: 98 F | OXYGEN SATURATION: 100 % | BODY MASS INDEX: 38.36 KG/M2 | RESPIRATION RATE: 16 BRPM | WEIGHT: 315 LBS

## 2024-08-31 DIAGNOSIS — N50.812 PAIN IN LEFT TESTICLE: Primary | ICD-10-CM

## 2024-08-31 DIAGNOSIS — N45.2 ORCHITIS OF LEFT TESTICLE: Primary | ICD-10-CM

## 2024-08-31 LAB
ALBUMIN SERPL-MCNC: 4.1 G/DL (ref 3.5–5.2)
ALBUMIN/GLOB SERPL: 1.5 {RATIO} (ref 1–2.5)
ALP SERPL-CCNC: 87 U/L (ref 40–129)
ALT SERPL-CCNC: 7 U/L (ref 5–41)
ANION GAP SERPL CALCULATED.3IONS-SCNC: 9 MMOL/L (ref 9–17)
AST SERPL-CCNC: 14 U/L
BACTERIA URNS QL MICRO: ABNORMAL
BASOPHILS # BLD: <0.03 K/UL (ref 0–0.2)
BASOPHILS NFR BLD: 0 % (ref 0–2)
BILIRUB SERPL-MCNC: 0.7 MG/DL (ref 0.3–1.2)
BILIRUB UR QL STRIP: NEGATIVE
BUN SERPL-MCNC: 11 MG/DL (ref 6–20)
BUN/CREAT SERPL: 16 (ref 9–20)
CALCIUM SERPL-MCNC: 8.9 MG/DL (ref 8.6–10.4)
CHARACTER UR: ABNORMAL
CHLORIDE SERPL-SCNC: 103 MMOL/L (ref 98–107)
CLARITY UR: CLEAR
CO2 SERPL-SCNC: 26 MMOL/L (ref 20–31)
COLOR UR: YELLOW
CREAT SERPL-MCNC: 0.7 MG/DL (ref 0.7–1.2)
EOSINOPHIL # BLD: <0.03 K/UL (ref 0–0.44)
EOSINOPHILS RELATIVE PERCENT: 0 % (ref 1–4)
EPI CELLS #/AREA URNS HPF: ABNORMAL /HPF (ref 0–5)
ERYTHROCYTE [DISTWIDTH] IN BLOOD BY AUTOMATED COUNT: 12.5 % (ref 11.8–14.4)
GFR, ESTIMATED: >90 ML/MIN/1.73M2
GLUCOSE SERPL-MCNC: 123 MG/DL (ref 70–99)
GLUCOSE UR STRIP-MCNC: NEGATIVE MG/DL
HCT VFR BLD AUTO: 42 % (ref 40.7–50.3)
HGB BLD-MCNC: 15.2 G/DL (ref 13–17)
HGB UR QL STRIP.AUTO: ABNORMAL
IMM GRANULOCYTES # BLD AUTO: <0.03 K/UL (ref 0–0.3)
IMM GRANULOCYTES NFR BLD: 0 %
KETONES UR STRIP-MCNC: NEGATIVE MG/DL
LEUKOCYTE ESTERASE UR QL STRIP: NEGATIVE
LYMPHOCYTES NFR BLD: 1.29 K/UL (ref 1.1–3.7)
LYMPHOCYTES RELATIVE PERCENT: 21 % (ref 24–43)
MAGNESIUM SERPL-MCNC: 1.9 MG/DL (ref 1.6–2.6)
MCH RBC QN AUTO: 31.1 PG (ref 25.2–33.5)
MCHC RBC AUTO-ENTMCNC: 36.2 G/DL (ref 25.2–33.5)
MCV RBC AUTO: 85.9 FL (ref 82.6–102.9)
MONOCYTES NFR BLD: 0.7 K/UL (ref 0.1–1.2)
MONOCYTES NFR BLD: 11 % (ref 3–12)
MUCOUS THREADS URNS QL MICRO: ABNORMAL
NEUTROPHILS NFR BLD: 68 % (ref 36–65)
NEUTS SEG NFR BLD: 4.24 K/UL (ref 1.5–8.1)
NITRITE UR QL STRIP: NEGATIVE
NRBC BLD-RTO: 0 PER 100 WBC
PH UR STRIP: 6 [PH] (ref 5–6)
PLATELET # BLD AUTO: 163 K/UL (ref 138–453)
PMV BLD AUTO: 9.4 FL (ref 8.1–13.5)
POTASSIUM SERPL-SCNC: 3.5 MMOL/L (ref 3.7–5.3)
PROT SERPL-MCNC: 6.9 G/DL (ref 6.4–8.3)
PROT UR STRIP-MCNC: NEGATIVE MG/DL
RBC # BLD AUTO: 4.89 M/UL (ref 4.21–5.77)
RBC #/AREA URNS HPF: ABNORMAL /HPF (ref 0–4)
SODIUM SERPL-SCNC: 138 MMOL/L (ref 135–144)
SP GR UR STRIP: 1.02 (ref 1.01–1.02)
UROBILINOGEN UR STRIP-ACNC: NORMAL EU/DL (ref 0–1)
WBC #/AREA URNS HPF: ABNORMAL /HPF (ref 0–4)
WBC OTHER # BLD: 6.3 K/UL (ref 3.5–11.3)

## 2024-08-31 PROCEDURE — 96365 THER/PROPH/DIAG IV INF INIT: CPT

## 2024-08-31 PROCEDURE — 81001 URINALYSIS AUTO W/SCOPE: CPT

## 2024-08-31 PROCEDURE — 80053 COMPREHEN METABOLIC PANEL: CPT

## 2024-08-31 PROCEDURE — 93976 VASCULAR STUDY: CPT

## 2024-08-31 PROCEDURE — 85025 COMPLETE CBC W/AUTO DIFF WBC: CPT

## 2024-08-31 PROCEDURE — 6370000000 HC RX 637 (ALT 250 FOR IP): Performed by: EMERGENCY MEDICINE

## 2024-08-31 PROCEDURE — 6360000002 HC RX W HCPCS: Performed by: EMERGENCY MEDICINE

## 2024-08-31 PROCEDURE — 83735 ASSAY OF MAGNESIUM: CPT

## 2024-08-31 PROCEDURE — 96375 TX/PRO/DX INJ NEW DRUG ADDON: CPT

## 2024-08-31 PROCEDURE — 99284 EMERGENCY DEPT VISIT MOD MDM: CPT

## 2024-08-31 PROCEDURE — 2580000003 HC RX 258: Performed by: EMERGENCY MEDICINE

## 2024-08-31 RX ORDER — KETOROLAC TROMETHAMINE 30 MG/ML
30 INJECTION, SOLUTION INTRAMUSCULAR; INTRAVENOUS ONCE
Status: COMPLETED | OUTPATIENT
Start: 2024-08-31 | End: 2024-08-31

## 2024-08-31 RX ORDER — DOXYCYCLINE 50 MG/1
100 CAPSULE ORAL ONCE
Status: COMPLETED | OUTPATIENT
Start: 2024-08-31 | End: 2024-08-31

## 2024-08-31 RX ORDER — IBUPROFEN 800 MG/1
800 TABLET, FILM COATED ORAL EVERY 8 HOURS PRN
Qty: 30 TABLET | Refills: 0 | Status: SHIPPED | OUTPATIENT
Start: 2024-08-31

## 2024-08-31 RX ORDER — DOXYCYCLINE 100 MG/1
100 CAPSULE ORAL 2 TIMES DAILY
Qty: 20 CAPSULE | Refills: 0 | Status: SHIPPED | OUTPATIENT
Start: 2024-08-31 | End: 2024-09-10

## 2024-08-31 RX ADMIN — DOXYCYCLINE 100 MG: 50 CAPSULE ORAL at 13:24

## 2024-08-31 RX ADMIN — CEFTRIAXONE 1000 MG: 1 INJECTION, POWDER, FOR SOLUTION INTRAMUSCULAR; INTRAVENOUS at 12:36

## 2024-08-31 RX ADMIN — KETOROLAC TROMETHAMINE 30 MG: 30 INJECTION, SOLUTION INTRAMUSCULAR at 10:02

## 2024-08-31 ASSESSMENT — PAIN DESCRIPTION - FREQUENCY: FREQUENCY: CONTINUOUS

## 2024-08-31 ASSESSMENT — ENCOUNTER SYMPTOMS
NAUSEA: 1
WHEEZING: 0
VOMITING: 0
DIARRHEA: 0
ABDOMINAL PAIN: 0
TROUBLE SWALLOWING: 0
BACK PAIN: 0
SHORTNESS OF BREATH: 0
SORE THROAT: 0

## 2024-08-31 ASSESSMENT — LIFESTYLE VARIABLES
HOW MANY STANDARD DRINKS CONTAINING ALCOHOL DO YOU HAVE ON A TYPICAL DAY: PATIENT DOES NOT DRINK
HOW OFTEN DO YOU HAVE A DRINK CONTAINING ALCOHOL: NEVER

## 2024-08-31 ASSESSMENT — PAIN DESCRIPTION - LOCATION: LOCATION: OTHER (COMMENT)

## 2024-08-31 ASSESSMENT — PAIN - FUNCTIONAL ASSESSMENT
PAIN_FUNCTIONAL_ASSESSMENT: 0-10
PAIN_FUNCTIONAL_ASSESSMENT: 0-10

## 2024-08-31 ASSESSMENT — PAIN DESCRIPTION - DESCRIPTORS: DESCRIPTORS: ACHING

## 2024-08-31 ASSESSMENT — PAIN SCALES - GENERAL
PAINLEVEL_OUTOF10: 10
PAINLEVEL_OUTOF10: 3

## 2024-08-31 ASSESSMENT — PAIN DESCRIPTION - PAIN TYPE: TYPE: ACUTE PAIN

## 2024-08-31 ASSESSMENT — PAIN DESCRIPTION - ORIENTATION: ORIENTATION: LEFT

## 2024-08-31 NOTE — ED PROVIDER NOTES
Lithotripsy (10/11/2012); Inguinal hernia repair (Right); lumbar laminectomy (10/31/2018); lumbar laminectomy (06/2021); and Cystoscopy (N/A, 10/11/2012).    CURRENT MEDICATIONS       Previous Medications    BLOOD PRESSURE KIT    1 Units by Does not apply route 2 times daily    CARVEDILOL (COREG) 3.125 MG TABLET    take 1 tablet by mouth twice a day with meals    CYANOCOBALAMIN 1000 MCG/ML INJECTION    inject 1 mL intramuscularly every month.    HYDROCHLOROTHIAZIDE 12.5 MG TABLET    Take 1 tablet by mouth daily    OXYBUTYNIN (DITROPAN-XL) 10 MG EXTENDED RELEASE TABLET        SEMAGLUTIDE,0.25 OR 0.5MG/DOS, 2 MG/1.5ML SOPN    Inject 0.5 mg into the skin once a week    SILDENAFIL (VIAGRA) 100 MG TABLET    Take 1 tablet by mouth as needed for Erectile Dysfunction    VENLAFAXINE (EFFEXOR XR) 150 MG EXTENDED RELEASE CAPSULE    Take 1 capsule by mouth daily       ALLERGIES     has No Known Allergies.    FAMILY HISTORY     He indicated that his mother is alive. He indicated that his father is alive. He indicated that the status of his maternal grandmother is unknown.     family history includes Alzheimer's Disease in his maternal grandmother; Diabetes in his father; High Blood Pressure in his father; High Cholesterol in his father and mother; Hypertension in his father; Kidney Disease in his father.    SOCIAL HISTORY      reports that he has never smoked. His smokeless tobacco use includes chew. He reports that he does not currently use alcohol. He reports that he does not use drugs.    PHYSICAL EXAM     INITIAL VITALS:  height is 1.93 m (6' 4\") and weight is 174.6 kg (385 lb) (abnormal). His skin temperature is 98 °F (36.7 °C). His blood pressure is 161/89 (abnormal) and his pulse is 87. His respiration is 16 and oxygen saturation is 95%.        Physical Exam  Constitutional:       General: He is not in acute distress.     Appearance: He is well-developed. He is obese. He is not ill-appearing, toxic-appearing or  [id number]

## 2024-08-31 NOTE — PROGRESS NOTES
[facility]             [address] [address]                        Telephone [phone]             Fax [phone]       Owen Interiano  :  1980  Age:  44 y.o.   MRN:  [id number]  Date of visit:  2024     Assessment and Plan:    1. Pain in left testicle  Left testicular pain ongoing for the last 3 to 4 days will refer to the ED at this time for further evaluation and rule out testicular torsion.  Has been having some severe pain with vomiting, left-sided low back pain associated with this.      Subjective:    Owen Interiano is a 44 y.o. male who presents to [facility] today (2024) for evaluation of:  Testicle Pain (Pt reports left testicle pain, redness and swelling starting Wednesday with some nausea, chills and left flank pain. )      Chief Complaint   Patient presents with    Testicle Pain     Pt reports left testicle pain, redness and swelling starting Wednesday with some nausea, chills and left flank pain.      He has the following problem list:  Patient Active Problem List   Diagnosis    Class 3 severe obesity due to excess calories with serious comorbidity and body mass index (BMI) of 50.0 to 59.9 in adult (HCC)    Obsessive-compulsive disorder    Anxiety, generalized    Thoracic outlet syndrome    Spinal stenosis    Acute left-sided low back pain with left-sided sciatica    Essential hypertension    Mixed hyperlipidemia    Nicotine dependence, chewing tobacco, uncomplicated    Multiple joint pain    Vitamin D deficiency    Moderate episode of recurrent major depressive disorder (HCC)    B12 deficiency    Black-out (not amnesia)    Numbness and tingling of both lower extremities        Review of Systems     Current medications are:  Current Facility-Administered Medications   Medication Dose Route Frequency Provider Last Rate Last Admin    cyanocobalamin injection 1,000 mcg

## 2024-09-04 ENCOUNTER — OFFICE VISIT (OUTPATIENT)
Dept: FAMILY MEDICINE CLINIC | Age: 44
End: 2024-09-04
Payer: COMMERCIAL

## 2024-09-04 VITALS
DIASTOLIC BLOOD PRESSURE: 80 MMHG | OXYGEN SATURATION: 96 % | HEART RATE: 76 BPM | SYSTOLIC BLOOD PRESSURE: 128 MMHG | WEIGHT: 315 LBS | BODY MASS INDEX: 53.61 KG/M2

## 2024-09-04 DIAGNOSIS — E55.9 VITAMIN D DEFICIENCY: ICD-10-CM

## 2024-09-04 DIAGNOSIS — M79.89 SWELLING OF BOTH LOWER EXTREMITIES: ICD-10-CM

## 2024-09-04 DIAGNOSIS — S80.812A ABRASION OF LEFT LOWER EXTREMITY, INITIAL ENCOUNTER: ICD-10-CM

## 2024-09-04 DIAGNOSIS — E66.01 CLASS 3 SEVERE OBESITY DUE TO EXCESS CALORIES WITH SERIOUS COMORBIDITY AND BODY MASS INDEX (BMI) OF 50.0 TO 59.9 IN ADULT (HCC): ICD-10-CM

## 2024-09-04 DIAGNOSIS — N50.89 TESTICULAR SWELLING, LEFT: Primary | ICD-10-CM

## 2024-09-04 DIAGNOSIS — E53.8 B12 DEFICIENCY: ICD-10-CM

## 2024-09-04 DIAGNOSIS — F33.1 MODERATE EPISODE OF RECURRENT MAJOR DEPRESSIVE DISORDER (HCC): ICD-10-CM

## 2024-09-04 DIAGNOSIS — I10 ESSENTIAL HYPERTENSION: ICD-10-CM

## 2024-09-04 LAB
VITAMIN B-12: 567 PG/ML (ref 239–931)
VITAMIN D 25-HYDROXY: 43.4 NG/ML (ref 30–100)

## 2024-09-04 PROCEDURE — G8417 CALC BMI ABV UP PARAM F/U: HCPCS | Performed by: NURSE PRACTITIONER

## 2024-09-04 PROCEDURE — 3074F SYST BP LT 130 MM HG: CPT | Performed by: NURSE PRACTITIONER

## 2024-09-04 PROCEDURE — 3079F DIAST BP 80-89 MM HG: CPT | Performed by: NURSE PRACTITIONER

## 2024-09-04 PROCEDURE — G8427 DOCREV CUR MEDS BY ELIG CLIN: HCPCS | Performed by: NURSE PRACTITIONER

## 2024-09-04 PROCEDURE — 4004F PT TOBACCO SCREEN RCVD TLK: CPT | Performed by: NURSE PRACTITIONER

## 2024-09-04 PROCEDURE — 99213 OFFICE O/P EST LOW 20 MIN: CPT | Performed by: NURSE PRACTITIONER

## 2024-09-04 ASSESSMENT — ENCOUNTER SYMPTOMS
WHEEZING: 0
COUGH: 0
SHORTNESS OF BREATH: 0
GASTROINTESTINAL NEGATIVE: 1

## 2024-09-04 NOTE — PROGRESS NOTES
[address], [address]  [place], [place] [address]  Dept: [phone]  Dept Fax: [phone]    Date of Service:  9/4/2024    Chief Complaint   Patient presents with    Medication Refill        Diagnoses / Plan:   1. Testicular swelling, left  Comments:  Continue current antibiotic regimen. Advise supportive underwear, elevation when possible, and monitor for further improvement.  2. Swelling of both lower extremities  Comments:  Minimal swelling noted during ov. Advised to reduce salt intake, maintain adequate hydration, and elevating legs when possible.  3. Essential hypertension  Assessment & Plan:   Chronic, at goal (stable), continue current treatment plan  4. Abrasion of left lower extremity, initial encounter  Comments:  Appears to be healing well. Monitor for s/s infection.  5. B12 deficiency  -     Vitamin B12; Future  6. Vitamin D deficiency  -     Vitamin D 25 Hydroxy; Future  7. Class 3 severe obesity due to excess calories with serious comorbidity and body mass index (BMI) of 50.0 to 59.9 in adult (HCC)  Comments:  Maintaining current weight  8. Moderate episode of recurrent major depressive disorder (HCC)  Assessment & Plan:   Chronic, at goal (stable), continue current treatment plan     Recommend taking breaks at work and elevate legs when possible.     Patient verbalizes understanding regarding plan of care and all questions answered.    Return in about 6 months (around 3/4/2025) for HTN.     Subjective:   History of Present Illness:  Patient presents for follow up after ED visit for swelling and pain to the left testicle. He experienced hot and cold sweats initially but has not had chills recently. He reports persistent testicular swelling despite starting antibiotics 3-4 days ago. He notes swelling slightly reduces overnight but increases throughout the day. Feeling less tired and having a normal appetite.     In addition, he mentions significant

## 2024-10-04 ENCOUNTER — NURSE ONLY (OUTPATIENT)
Dept: FAMILY MEDICINE CLINIC | Age: 44
End: 2024-10-04

## 2024-10-04 DIAGNOSIS — E53.8 B12 DEFICIENCY: Primary | ICD-10-CM

## 2024-10-04 RX ADMIN — CYANOCOBALAMIN 1000 MCG: 1000 INJECTION, SOLUTION INTRAMUSCULAR; SUBCUTANEOUS at 10:31

## 2024-10-24 ENCOUNTER — OFFICE VISIT (OUTPATIENT)
Dept: FAMILY MEDICINE CLINIC | Age: 44
End: 2024-10-24
Payer: COMMERCIAL

## 2024-10-24 VITALS
SYSTOLIC BLOOD PRESSURE: 136 MMHG | OXYGEN SATURATION: 97 % | BODY MASS INDEX: 54.17 KG/M2 | HEART RATE: 76 BPM | DIASTOLIC BLOOD PRESSURE: 86 MMHG | WEIGHT: 315 LBS

## 2024-10-24 DIAGNOSIS — R60.0 PERIPHERAL EDEMA: Primary | ICD-10-CM

## 2024-10-24 DIAGNOSIS — R73.01 IMPAIRED FASTING GLUCOSE: ICD-10-CM

## 2024-10-24 DIAGNOSIS — I51.7 LEFT VENTRICULAR HYPERTROPHY: ICD-10-CM

## 2024-10-24 PROCEDURE — G8427 DOCREV CUR MEDS BY ELIG CLIN: HCPCS | Performed by: STUDENT IN AN ORGANIZED HEALTH CARE EDUCATION/TRAINING PROGRAM

## 2024-10-24 PROCEDURE — 4004F PT TOBACCO SCREEN RCVD TLK: CPT | Performed by: STUDENT IN AN ORGANIZED HEALTH CARE EDUCATION/TRAINING PROGRAM

## 2024-10-24 PROCEDURE — 99213 OFFICE O/P EST LOW 20 MIN: CPT | Performed by: STUDENT IN AN ORGANIZED HEALTH CARE EDUCATION/TRAINING PROGRAM

## 2024-10-24 PROCEDURE — 3079F DIAST BP 80-89 MM HG: CPT | Performed by: STUDENT IN AN ORGANIZED HEALTH CARE EDUCATION/TRAINING PROGRAM

## 2024-10-24 PROCEDURE — 3075F SYST BP GE 130 - 139MM HG: CPT | Performed by: STUDENT IN AN ORGANIZED HEALTH CARE EDUCATION/TRAINING PROGRAM

## 2024-10-24 PROCEDURE — G8417 CALC BMI ABV UP PARAM F/U: HCPCS | Performed by: STUDENT IN AN ORGANIZED HEALTH CARE EDUCATION/TRAINING PROGRAM

## 2024-10-24 PROCEDURE — G8484 FLU IMMUNIZE NO ADMIN: HCPCS | Performed by: STUDENT IN AN ORGANIZED HEALTH CARE EDUCATION/TRAINING PROGRAM

## 2024-10-24 NOTE — PROGRESS NOTES
[address], [address]  [place] [address]  Phone: [phone]  Fax: [phone]      Date of Visit:  10/24/24  Patient Name: Owen Interiano   Patient :  1980     ASSESSMENT/PLAN     1. Peripheral edema  -     TSH With Reflex Ft4; Future  -     Brain Natriuretic Peptide; Future  -     Echo (TTE) complete (PRN contrast/bubble/strain/3D); Future  2. Impaired fasting glucose  -     Hemoglobin A1C; Future  3. Left ventricular hypertrophy  -     Echo (TTE) complete (PRN contrast/bubble/strain/3D); Future     Given symptoms of peripheral edema and elevated blood pressures and history of poor R wave progression on ECG, will order echocardiogram in addition to labs as above. Will also add A1c onto labwork. Blood pressures improved, will continue current regimen.    - Questions/concerns answered. Patient verbalized and expressed understanding. Medications, laboratory testing, imaging, consultation, and follow up as documented in this encounter.       HPI:     Owen Interiano is a 44 y.o. male with   Patient Active Problem List   Diagnosis    Class 3 severe obesity due to excess calories with serious comorbidity and body mass index (BMI) of 50.0 to 59.9 in adult    Obsessive-compulsive disorder    Anxiety, generalized    Thoracic outlet syndrome    Spinal stenosis    Acute left-sided low back pain with left-sided sciatica    Essential hypertension    Mixed hyperlipidemia    Nicotine dependence, chewing tobacco, uncomplicated    Multiple joint pain    Vitamin D deficiency    Moderate episode of recurrent major depressive disorder (HCC)    B12 deficiency    Black-out (not amnesia)    Numbness and tingling of both lower extremities     who presents today to discuss   Chief Complaint   Patient presents with    Hypertension     1mo follow up       HPI: Presents for follow up for blood pressure. Adjustments 1 month ago. Pressures much better. Does

## 2025-02-03 ENCOUNTER — OFFICE VISIT (OUTPATIENT)
Dept: FAMILY MEDICINE CLINIC | Age: 45
End: 2025-02-03

## 2025-02-03 VITALS
HEART RATE: 84 BPM | OXYGEN SATURATION: 93 % | SYSTOLIC BLOOD PRESSURE: 142 MMHG | BODY MASS INDEX: 55.53 KG/M2 | DIASTOLIC BLOOD PRESSURE: 84 MMHG | WEIGHT: 315 LBS

## 2025-02-03 DIAGNOSIS — E55.9 VITAMIN D DEFICIENCY: ICD-10-CM

## 2025-02-03 DIAGNOSIS — N30.01 ACUTE CYSTITIS WITH HEMATURIA: ICD-10-CM

## 2025-02-03 DIAGNOSIS — E53.8 B12 DEFICIENCY: ICD-10-CM

## 2025-02-03 DIAGNOSIS — E66.813 CLASS 3 SEVERE OBESITY DUE TO EXCESS CALORIES WITH SERIOUS COMORBIDITY AND BODY MASS INDEX (BMI) OF 50.0 TO 59.9 IN ADULT: ICD-10-CM

## 2025-02-03 DIAGNOSIS — R53.83 FATIGUE, UNSPECIFIED TYPE: ICD-10-CM

## 2025-02-03 DIAGNOSIS — R35.0 URINARY FREQUENCY: ICD-10-CM

## 2025-02-03 DIAGNOSIS — R82.90 ABNORMAL URINE FINDINGS: ICD-10-CM

## 2025-02-03 DIAGNOSIS — R73.01 IMPAIRED FASTING GLUCOSE: ICD-10-CM

## 2025-02-03 DIAGNOSIS — F33.1 MODERATE EPISODE OF RECURRENT MAJOR DEPRESSIVE DISORDER (HCC): ICD-10-CM

## 2025-02-03 DIAGNOSIS — E66.01 CLASS 3 SEVERE OBESITY DUE TO EXCESS CALORIES WITH SERIOUS COMORBIDITY AND BODY MASS INDEX (BMI) OF 50.0 TO 59.9 IN ADULT: ICD-10-CM

## 2025-02-03 DIAGNOSIS — E78.2 MIXED HYPERLIPIDEMIA: ICD-10-CM

## 2025-02-03 DIAGNOSIS — I10 ESSENTIAL HYPERTENSION: Primary | ICD-10-CM

## 2025-02-03 LAB
ALBUMIN/GLOBULIN RATIO: 1.7 G/DL
ALBUMIN: 4.5 G/DL (ref 3.5–5)
ALP BLD-CCNC: 101 UNITS/L (ref 38–126)
ALT SERPL-CCNC: 38 UNITS/L (ref 4–50)
ANION GAP SERPL CALCULATED.3IONS-SCNC: 8.6 MMOL/L (ref 3–11)
APPEARANCE FLUID: NORMAL
AST SERPL-CCNC: 26 UNITS/L (ref 17–59)
BILIRUB SERPL-MCNC: 0.9 MG/DL (ref 0.2–1.3)
BILIRUBIN, POC: NEGATIVE
BLOOD URINE, POC: NORMAL
BUN BLDV-MCNC: 19 MG/DL (ref 9–20)
CALCIUM SERPL-MCNC: 9.4 MG/DL (ref 8.4–10.2)
CHLORIDE BLD-SCNC: 103 MMOL/L (ref 98–120)
CHOLESTEROL, TOTAL: 164 MG/DL (ref 50–200)
CHOLESTEROL/HDL RATIO: 4 RATIO (ref 0–4.5)
CLARITY, POC: CLEAR
CO2: 27 MMOL/L (ref 22–31)
COLOR, POC: YELLOW
CREAT SERPL-MCNC: 0.7 MG/DL (ref 0.7–1.3)
FOLATE: 7.6 NG/ML (ref 2.8–20)
GFR, ESTIMATED: > 60
GLOBULIN: 2.7 G/DL
GLUCOSE URINE, POC: NEGATIVE MG/DL
GLUCOSE: 99 MG/DL (ref 75–110)
HBA1C MFR BLD: 5.2 %
HDLC SERPL-MCNC: 37 MG/DL (ref 36–68)
KETONES, POC: NEGATIVE MG/DL
LDL CHOLESTEROL: 100.6 MG/DL (ref 0–160)
LEUKOCYTE EST, POC: NORMAL
NITRITE, POC: NEGATIVE
PH, POC: 6
POTASSIUM SERPL-SCNC: 4.4 MMOL/L (ref 3.6–5)
PROTEIN, POC: NEGATIVE MG/DL
SODIUM BLD-SCNC: 138 MMOL/L (ref 135–145)
SPECIFIC GRAVITY, POC: 1.02
TOTAL PROTEIN: 7.2 G/DL (ref 6.3–8.2)
TRIGL SERPL-MCNC: 132 MG/DL (ref 10–250)
TSH REFLEX FT4: 0.67 MIU/ML (ref 0.49–4.67)
UROBILINOGEN, POC: 0.2 MG/DL
VITAMIN B-12: 323 PG/ML (ref 239–931)
VITAMIN D 25-HYDROXY: 24.4 NG/ML (ref 30–100)
VLDLC SERPL CALC-MCNC: 26 MG/DL (ref 0–50)

## 2025-02-03 RX ORDER — CARVEDILOL 3.12 MG/1
TABLET ORAL
Qty: 180 TABLET | Refills: 1 | Status: SHIPPED | OUTPATIENT
Start: 2025-02-03

## 2025-02-03 RX ORDER — VENLAFAXINE HYDROCHLORIDE 150 MG/1
150 CAPSULE, EXTENDED RELEASE ORAL DAILY
Qty: 90 CAPSULE | Refills: 2 | Status: SHIPPED | OUTPATIENT
Start: 2025-02-03

## 2025-02-03 RX ORDER — CIPROFLOXACIN 500 MG/1
500 TABLET, FILM COATED ORAL 2 TIMES DAILY
Qty: 28 TABLET | Refills: 0 | Status: SHIPPED | OUTPATIENT
Start: 2025-02-03

## 2025-02-03 RX ORDER — LOSARTAN POTASSIUM 25 MG/1
25 TABLET ORAL DAILY
Qty: 90 TABLET | Refills: 1 | Status: SHIPPED | OUTPATIENT
Start: 2025-02-03

## 2025-02-03 SDOH — ECONOMIC STABILITY: FOOD INSECURITY: WITHIN THE PAST 12 MONTHS, THE FOOD YOU BOUGHT JUST DIDN'T LAST AND YOU DIDN'T HAVE MONEY TO GET MORE.: NEVER TRUE

## 2025-02-03 SDOH — ECONOMIC STABILITY: FOOD INSECURITY: WITHIN THE PAST 12 MONTHS, YOU WORRIED THAT YOUR FOOD WOULD RUN OUT BEFORE YOU GOT MONEY TO BUY MORE.: NEVER TRUE

## 2025-02-03 ASSESSMENT — PATIENT HEALTH QUESTIONNAIRE - PHQ9
SUM OF ALL RESPONSES TO PHQ9 QUESTIONS 1 & 2: 0
1. LITTLE INTEREST OR PLEASURE IN DOING THINGS: NOT AT ALL
SUM OF ALL RESPONSES TO PHQ QUESTIONS 1-9: 6
9. THOUGHTS THAT YOU WOULD BE BETTER OFF DEAD, OR OF HURTING YOURSELF: NOT AT ALL
2. FEELING DOWN, DEPRESSED OR HOPELESS: NOT AT ALL
8. MOVING OR SPEAKING SO SLOWLY THAT OTHER PEOPLE COULD HAVE NOTICED. OR THE OPPOSITE, BEING SO FIGETY OR RESTLESS THAT YOU HAVE BEEN MOVING AROUND A LOT MORE THAN USUAL: NOT AT ALL
6. FEELING BAD ABOUT YOURSELF - OR THAT YOU ARE A FAILURE OR HAVE LET YOURSELF OR YOUR FAMILY DOWN: NOT AT ALL
10. IF YOU CHECKED OFF ANY PROBLEMS, HOW DIFFICULT HAVE THESE PROBLEMS MADE IT FOR YOU TO DO YOUR WORK, TAKE CARE OF THINGS AT HOME, OR GET ALONG WITH OTHER PEOPLE: NOT DIFFICULT AT ALL
5. POOR APPETITE OR OVEREATING: MORE THAN HALF THE DAYS
SUM OF ALL RESPONSES TO PHQ QUESTIONS 1-9: 6
3. TROUBLE FALLING OR STAYING ASLEEP: MORE THAN HALF THE DAYS
7. TROUBLE CONCENTRATING ON THINGS, SUCH AS READING THE NEWSPAPER OR WATCHING TELEVISION: SEVERAL DAYS
SUM OF ALL RESPONSES TO PHQ QUESTIONS 1-9: 6
SUM OF ALL RESPONSES TO PHQ QUESTIONS 1-9: 6
4. FEELING TIRED OR HAVING LITTLE ENERGY: SEVERAL DAYS

## 2025-02-03 NOTE — PROGRESS NOTES
[address], [address]  [place] [address]  Dept: [phone]  Dept Fax: [phone]    Date of Service:  2/3/2025    Owen Interiano is a 44 y.o. male who presents today for his medical conditions/complaints as noted below.      Chief Complaint   Patient presents with   • Urinary Frequency     Has been having urinary frequency for a while and is getting worse, denies any pain or burning, has seen urology a long time ago.      DIAGNOSIS / PLAN:   Essential hypertension  Assessment & Plan:   Chronic, not at goal (unstable), medication adherence emphasized and lifestyle modifications recommended  Orders:  -     carvedilol (COREG) 3.125 MG tablet; take 1 tablet by mouth twice a day with meals, Disp-180 tablet, R-1Normal  -     losartan (COZAAR) 25 MG tablet; Take 1 tablet by mouth daily, Disp-90 tablet, R-1Normal  -     Comprehensive Metabolic Panel  -     Lipid Panel  Urinary frequency  -     POCT glycosylated hemoglobin (Hb A1C)  -     POCT Urinalysis no Micro  Mixed hyperlipidemia  -     Comprehensive Metabolic Panel  Impaired fasting glucose  -     POCT glycosylated hemoglobin (Hb A1C)  -     Comprehensive Metabolic Panel  -     Lipid Panel  Abnormal urine findings  -     POCT Urinalysis no Micro  Acute cystitis with hematuria  -     POCT Urinalysis no Micro  -     ciprofloxacin (CIPRO) 500 MG tablet; Take 1 tablet by mouth 2 times daily, Disp-28 tablet, R-0Normal  Moderate episode of recurrent major depressive disorder (HCC)  Assessment & Plan:   Chronic, at goal (stable), continue current treatment plan  Orders:  -     venlafaxine (EFFEXOR XR) 150 MG extended release capsule; Take 1 capsule by mouth daily, Disp-90 capsule, R-2Normal  -     Comprehensive Metabolic Panel  -     Folate  B12 deficiency  Vitamin D deficiency  Fatigue, unspecified type  -     POCT glycosylated hemoglobin (Hb A1C)  -     Folate  Class 3 severe obesity due to excess calories with

## 2025-02-15 NOTE — ASSESSMENT & PLAN NOTE
Chronic, not at goal (unstable), medication adherence emphasized and lifestyle modifications recommended

## 2025-04-29 ENCOUNTER — OFFICE VISIT (OUTPATIENT)
Dept: FAMILY MEDICINE CLINIC | Age: 45
End: 2025-04-29
Payer: COMMERCIAL

## 2025-04-29 VITALS
BODY MASS INDEX: 55.38 KG/M2 | WEIGHT: 315 LBS | OXYGEN SATURATION: 97 % | DIASTOLIC BLOOD PRESSURE: 60 MMHG | SYSTOLIC BLOOD PRESSURE: 140 MMHG | HEART RATE: 78 BPM

## 2025-04-29 DIAGNOSIS — R73.01 IMPAIRED FASTING GLUCOSE: ICD-10-CM

## 2025-04-29 DIAGNOSIS — E55.9 VITAMIN D DEFICIENCY: ICD-10-CM

## 2025-04-29 DIAGNOSIS — I10 ESSENTIAL HYPERTENSION: ICD-10-CM

## 2025-04-29 DIAGNOSIS — E53.8 B12 DEFICIENCY: ICD-10-CM

## 2025-04-29 DIAGNOSIS — E66.813 CLASS 3 SEVERE OBESITY DUE TO EXCESS CALORIES WITH SERIOUS COMORBIDITY AND BODY MASS INDEX (BMI) OF 50.0 TO 59.9 IN ADULT (HCC): Primary | ICD-10-CM

## 2025-04-29 PROCEDURE — G8417 CALC BMI ABV UP PARAM F/U: HCPCS | Performed by: NURSE PRACTITIONER

## 2025-04-29 PROCEDURE — 3078F DIAST BP <80 MM HG: CPT | Performed by: NURSE PRACTITIONER

## 2025-04-29 PROCEDURE — 3077F SYST BP >= 140 MM HG: CPT | Performed by: NURSE PRACTITIONER

## 2025-04-29 PROCEDURE — 4004F PT TOBACCO SCREEN RCVD TLK: CPT | Performed by: NURSE PRACTITIONER

## 2025-04-29 PROCEDURE — G8427 DOCREV CUR MEDS BY ELIG CLIN: HCPCS | Performed by: NURSE PRACTITIONER

## 2025-04-29 PROCEDURE — 99214 OFFICE O/P EST MOD 30 MIN: CPT | Performed by: NURSE PRACTITIONER

## 2025-04-29 RX ORDER — LOSARTAN POTASSIUM 50 MG/1
50 TABLET ORAL DAILY
Qty: 30 TABLET | Refills: 2 | Status: SHIPPED | OUTPATIENT
Start: 2025-04-29

## 2025-04-29 NOTE — PROGRESS NOTES
[place] [address] [facility], [address]  [place] [address]  Dept: [phone]  Dept Fax: [phone]    Date of Service:  4/29/2025    Owen Interiano is a 45 y.o. male who presents today for his medical conditions/complaints as noted below.      Chief Complaint   Patient presents with    Results     Here to review results of labs done in feb.      DIAGNOSIS / PLAN:     Assessment & Plan  1. Hypertension: Chronic.  - Blood pressure readings are elevated at 142/72 mmHg.  - Increase dosage of losartan to 50 mg, prescription sent to [facility].  - Take two 25 mg tablets if available at home.  - Monitor blood pressure regularly.    2. B12 deficiency: Chronic.  - B12 levels have been observed to be decreasing, with a recent reading of 323.  - Currently receiving B12 injections and should continue them to improve levels.  - Discuss the importance of maintaining B12 injections to prevent further decline.  - Continue B12 injections as prescribed.    3. Obesity: Chronic.  - Prescription for Zepbound issued to [facility], inquire about cost and availability of a discount card.  - Discuss alternative plan involving Victoza if Zepbound prescription is not approved.  - Initiate Zepbound treatment upon approval.    4. Impaired fasting glucose: Chronic.  - Fasting glucose levels are elevated, indicating impaired fasting glucose.  - Use of Zepbound, which is also indicated for prediabetes, will be initiated to help manage glucose levels.  - Discuss the benefits of Zepbound for managing prediabetes and obesity.  - Initiate Zepbound treatment upon approval.    5. Vitamin D deficiency: Chronic.  - Vitamin D levels are significantly low, potentially due to inadequate sun exposure or insufficient skin absorption.  - Increase outdoor activities and continue vitamin D supplementation.  - Continue current vitamin D supplement regimen.       1. Class 3 severe obesity due to excess calories with serious

## 2025-04-30 ENCOUNTER — TELEPHONE (OUTPATIENT)
Dept: FAMILY MEDICINE CLINIC | Age: 45
End: 2025-04-30

## 2025-04-30 DIAGNOSIS — I10 ESSENTIAL HYPERTENSION: ICD-10-CM

## 2025-04-30 DIAGNOSIS — E66.813 CLASS 3 SEVERE OBESITY DUE TO EXCESS CALORIES WITH SERIOUS COMORBIDITY AND BODY MASS INDEX (BMI) OF 50.0 TO 59.9 IN ADULT (HCC): Primary | ICD-10-CM

## 2025-04-30 DIAGNOSIS — E78.2 MIXED HYPERLIPIDEMIA: ICD-10-CM

## 2025-04-30 NOTE — TELEPHONE ENCOUNTER
His Zepbound did not get approved.  Was told to call back if that happened and that there might be one more med that he could try.  Uses [facility], [place].

## 2025-04-30 NOTE — TELEPHONE ENCOUNTER
Please notify patient, based on the prior Auth denial for Zepbound, Wegovy is recommended.  A prescription for Wegovy has been sent to the pharmacy.

## 2025-05-06 DIAGNOSIS — R73.01 IFG (IMPAIRED FASTING GLUCOSE): Primary | ICD-10-CM

## 2025-05-06 RX ORDER — LIRAGLUTIDE 6 MG/ML
0.6 INJECTION SUBCUTANEOUS DAILY
Qty: 3 ML | Refills: 0 | Status: SHIPPED | OUTPATIENT
Start: 2025-05-06

## 2025-05-06 NOTE — TELEPHONE ENCOUNTER
Pt states when went to pharmacy out of pocket cost is $1800 did offer savings information. Pt is asking about Victoza that was discussed and is requesting a prescription be sent to see if covered.

## 2025-05-07 ENCOUNTER — TELEPHONE (OUTPATIENT)
Dept: FAMILY MEDICINE CLINIC | Age: 45
End: 2025-05-07

## 2025-05-07 DIAGNOSIS — R73.01 IFG (IMPAIRED FASTING GLUCOSE): ICD-10-CM

## 2025-05-09 RX ORDER — LIRAGLUTIDE 6 MG/ML
INJECTION SUBCUTANEOUS
Qty: 9 ML | Refills: 0 | OUTPATIENT
Start: 2025-05-09

## 2025-05-13 NOTE — TELEPHONE ENCOUNTER
The only other option would be paying cash for Zepbound through [facility] pharmacy at a discounted price.  He can visit the [employer] website for additional information.

## 2025-05-23 ENCOUNTER — CLINICAL SUPPORT (OUTPATIENT)
Dept: FAMILY MEDICINE CLINIC | Age: 45
End: 2025-05-23
Payer: COMMERCIAL

## 2025-05-23 DIAGNOSIS — E53.8 B12 DEFICIENCY: Primary | ICD-10-CM

## 2025-05-23 PROCEDURE — 96372 THER/PROPH/DIAG INJ SC/IM: CPT | Performed by: NURSE PRACTITIONER

## 2025-05-23 RX ORDER — CYANOCOBALAMIN 1000 UG/ML
1000 INJECTION, SOLUTION INTRAMUSCULAR; SUBCUTANEOUS
Status: SHIPPED | OUTPATIENT
Start: 2025-05-23 | End: 2025-10-20

## 2025-05-23 RX ORDER — CYANOCOBALAMIN 1000 UG/ML
1000 INJECTION, SOLUTION INTRAMUSCULAR; SUBCUTANEOUS
Qty: 1 ML | Refills: 4 | Status: SHIPPED | OUTPATIENT
Start: 2025-05-23

## 2025-05-23 RX ADMIN — CYANOCOBALAMIN 1000 MCG: 1000 INJECTION, SOLUTION INTRAMUSCULAR; SUBCUTANEOUS at 08:09

## 2025-05-23 NOTE — TELEPHONE ENCOUNTER
Owen Interiano is calling to request a refill on the following medication(s):  Requested Prescriptions     Pending Prescriptions Disp Refills    cyanocobalamin 1000 MCG/ML injection 1 mL 4     Sig: Inject 1 mL into the muscle every 30 days       Last Visit Date (If Applicable):  5/23/2025    Next Visit Date:    5/27/2025

## 2025-05-23 NOTE — PROGRESS NOTES
After obtaining consent, and per orders of Nunu JIMÉNEZ, injection of b12 given in Left deltoid by Ashley Young MA. Patient instructed to remain in clinic for 20 minutes afterwards, and to report any adverse reaction to me immediately.

## 2025-05-27 ENCOUNTER — OFFICE VISIT (OUTPATIENT)
Dept: FAMILY MEDICINE CLINIC | Age: 45
End: 2025-05-27
Payer: COMMERCIAL

## 2025-05-27 VITALS
OXYGEN SATURATION: 97 % | DIASTOLIC BLOOD PRESSURE: 94 MMHG | SYSTOLIC BLOOD PRESSURE: 154 MMHG | BODY MASS INDEX: 56.92 KG/M2 | WEIGHT: 315 LBS | HEART RATE: 84 BPM

## 2025-05-27 DIAGNOSIS — R73.01 IFG (IMPAIRED FASTING GLUCOSE): ICD-10-CM

## 2025-05-27 DIAGNOSIS — M62.830 SPASM OF RIGHT TRAPEZIUS MUSCLE: ICD-10-CM

## 2025-05-27 DIAGNOSIS — M54.2 ACUTE NECK PAIN: ICD-10-CM

## 2025-05-27 DIAGNOSIS — I10 ESSENTIAL HYPERTENSION: Primary | ICD-10-CM

## 2025-05-27 PROCEDURE — G8427 DOCREV CUR MEDS BY ELIG CLIN: HCPCS | Performed by: NURSE PRACTITIONER

## 2025-05-27 PROCEDURE — 99213 OFFICE O/P EST LOW 20 MIN: CPT | Performed by: NURSE PRACTITIONER

## 2025-05-27 PROCEDURE — 3080F DIAST BP >= 90 MM HG: CPT | Performed by: NURSE PRACTITIONER

## 2025-05-27 PROCEDURE — 3077F SYST BP >= 140 MM HG: CPT | Performed by: NURSE PRACTITIONER

## 2025-05-27 PROCEDURE — G8417 CALC BMI ABV UP PARAM F/U: HCPCS | Performed by: NURSE PRACTITIONER

## 2025-05-27 PROCEDURE — 4004F PT TOBACCO SCREEN RCVD TLK: CPT | Performed by: NURSE PRACTITIONER

## 2025-05-27 RX ORDER — MELOXICAM 15 MG/1
15 TABLET ORAL DAILY
Qty: 30 TABLET | Refills: 2 | Status: SHIPPED | OUTPATIENT
Start: 2025-05-27

## 2025-05-27 RX ORDER — CYCLOBENZAPRINE HCL 10 MG
10 TABLET ORAL NIGHTLY PRN
Qty: 30 TABLET | Refills: 0 | Status: SHIPPED | OUTPATIENT
Start: 2025-05-27 | End: 2025-06-26

## 2025-05-27 RX ORDER — LOSARTAN POTASSIUM 100 MG/1
100 TABLET ORAL DAILY
Qty: 30 TABLET | Refills: 2 | Status: SHIPPED | OUTPATIENT
Start: 2025-05-27

## 2025-05-27 NOTE — PROGRESS NOTES
[place] [address] [facility], [address]  [place] [id number]  Dept: [phone]  Dept Fax: [phone]    Date of Service:  5/27/2025    Owen Interiano is a 45 y.o. male who presents today for his medical conditions/complaints as noted below.      Chief Complaint   Patient presents with    Arm Pain     C/o right arm pain that radiates into shoulder      DIAGNOSIS / PLAN:     Assessment & Plan  1. Right arm pain: Acute.  - Prescription for meloxicam 15 mg once daily to reduce inflammation.  - Flexeril 10 mg prescribed to be taken at night due to potential drowsiness.  - Apply ice for 20 minutes to alleviate inflammation.  - Avoid strenuous activities.  - Introduce stretching exercises once pain subsides.  - Consider MRI if no improvement, pending insurance approval.    2. Type 2 diabetes: Stable. A1c 5.2 on 02/2025.  - Order glucose tolerance test to further evaluate condition.    3. Hypertension: Chronic.  - Continue losartan 100 mg daily.   - Blood pressure rechecked during visit: 154/94.       Essential hypertension  -     losartan (COZAAR) 100 MG tablet; Take 1 tablet by mouth daily, Disp-30 tablet, R-2Normal  IFG (impaired fasting glucose)  -     Glucose Tolerance, 2 Hrs; Future  Acute neck pain  -     meloxicam (MOBIC) 15 MG tablet; Take 1 tablet by mouth daily, Disp-30 tablet, R-2Normal  Spasm of right trapezius muscle  -     cyclobenzaprine (FLEXERIL) 10 MG tablet; Take 1 tablet by mouth nightly as needed for Muscle spasms, Disp-30 tablet, R-0Normal     Discussed the purpose, benefits, and potential side effects of the prescribed medications in detail. Addressed all of the patient's questions, and the patient expressed clear understanding. Reviewed health maintenance recommendations and advised the patient to continue their current medications, diet, and exercise regimen. The patient agreed with the proposed treatment plan. Follow-up scheduled as directed.    Return in

## 2025-06-17 ENCOUNTER — OFFICE VISIT (OUTPATIENT)
Dept: FAMILY MEDICINE CLINIC | Age: 45
End: 2025-06-17
Payer: COMMERCIAL

## 2025-06-17 VITALS
DIASTOLIC BLOOD PRESSURE: 94 MMHG | HEART RATE: 91 BPM | SYSTOLIC BLOOD PRESSURE: 144 MMHG | BODY MASS INDEX: 55.99 KG/M2 | WEIGHT: 315 LBS | OXYGEN SATURATION: 99 %

## 2025-06-17 DIAGNOSIS — I10 ESSENTIAL HYPERTENSION: Primary | ICD-10-CM

## 2025-06-17 DIAGNOSIS — M62.830 SPASM OF RIGHT TRAPEZIUS MUSCLE: ICD-10-CM

## 2025-06-17 PROCEDURE — 96372 THER/PROPH/DIAG INJ SC/IM: CPT | Performed by: NURSE PRACTITIONER

## 2025-06-17 PROCEDURE — G8417 CALC BMI ABV UP PARAM F/U: HCPCS | Performed by: NURSE PRACTITIONER

## 2025-06-17 PROCEDURE — G8427 DOCREV CUR MEDS BY ELIG CLIN: HCPCS | Performed by: NURSE PRACTITIONER

## 2025-06-17 PROCEDURE — 3077F SYST BP >= 140 MM HG: CPT | Performed by: NURSE PRACTITIONER

## 2025-06-17 PROCEDURE — 3080F DIAST BP >= 90 MM HG: CPT | Performed by: NURSE PRACTITIONER

## 2025-06-17 PROCEDURE — 4004F PT TOBACCO SCREEN RCVD TLK: CPT | Performed by: NURSE PRACTITIONER

## 2025-06-17 PROCEDURE — 99214 OFFICE O/P EST MOD 30 MIN: CPT | Performed by: NURSE PRACTITIONER

## 2025-06-17 RX ORDER — CYCLOBENZAPRINE HCL 10 MG
10 TABLET ORAL NIGHTLY PRN
Qty: 30 TABLET | Refills: 2 | Status: SHIPPED | OUTPATIENT
Start: 2025-06-17 | End: 2025-09-15

## 2025-06-17 RX ORDER — AMLODIPINE BESYLATE 5 MG/1
5 TABLET ORAL DAILY
Qty: 30 TABLET | Refills: 5 | Status: SHIPPED | OUTPATIENT
Start: 2025-06-17

## 2025-06-17 RX ORDER — LOSARTAN POTASSIUM AND HYDROCHLOROTHIAZIDE 12.5; 1 MG/1; MG/1
1 TABLET ORAL DAILY
Qty: 30 TABLET | Refills: 5 | Status: SHIPPED | OUTPATIENT
Start: 2025-06-17

## 2025-06-17 RX ORDER — AMLODIPINE BESYLATE 10 MG/1
10 TABLET ORAL DAILY
Qty: 30 TABLET | Refills: 5 | Status: SHIPPED | OUTPATIENT
Start: 2025-06-17 | End: 2025-06-17

## 2025-06-17 RX ADMIN — CYANOCOBALAMIN 1000 MCG: 1000 INJECTION, SOLUTION INTRAMUSCULAR; SUBCUTANEOUS at 09:00

## 2025-06-17 NOTE — PROGRESS NOTES
[place] [address] [facility], [address]  [place] [address]  Dept: [phone]  Dept Fax: [phone]    Date of Service:  6/17/2025    Owen Interiano is a 45 y.o. male who presents today for his medical conditions/complaints as noted below.      Chief Complaint   Patient presents with    Hypertension     F/u      DIAGNOSIS / PLAN:     Assessment & Plan  1. Hypertension: Chronic.  - Prescribed combination tablet of losartan 100 mg and hydrochlorothiazide 12.5 mg.  - Initiated low dose of amlodipine 5 mg with the possibility of increasing to 10 mg.  - Discussed potential side effects of meloxicam, including its impact on blood pressure and kidney function.    2. Hyperglycemia.  - Advised to complete scheduled blood glucose test in 2 weeks.  - Emphasized the importance of monitoring diet and reducing sugar intake.  - Discussed the significance of high blood glucose readings and potential diabetes.    3. Muscle tightness.  - Advised to continue using therapy ball for self-massage.  - Provided refill of Flexeril.  - Discussed the use of meloxicam as needed.  - Emphasized monitoring for adverse effects of meloxicam.    Follow-up  - Follow-up appointment scheduled in 3 to 4 weeks.       Essential hypertension  Assessment & Plan:   Chronic, worsening (exacerbation), medication adherence emphasized and lifestyle modifications recommended  Orders:  -     losartan-hydroCHLOROthiazide (HYZAAR) 100-12.5 MG per tablet; Take 1 tablet by mouth daily, Disp-30 tablet, R-5Normal  -     amLODIPine (NORVASC) 5 MG tablet; Take 1 tablet by mouth daily, Disp-30 tablet, R-5Normal  Spasm of right trapezius muscle  Comments:  Refill flexeril  Orders:  -     cyclobenzaprine (FLEXERIL) 10 MG tablet; Take 1 tablet by mouth nightly as needed for Muscle spasms, Disp-30 tablet, R-2Normal       Discussed the purpose, benefits, and potential side effects of the prescribed medications in detail. Addressed all

## 2025-06-24 ENCOUNTER — RESULTS FOLLOW-UP (OUTPATIENT)
Dept: FAMILY MEDICINE CLINIC | Age: 45
End: 2025-06-24

## 2025-06-24 LAB — GLUCOSE TOLERANCE TEST 2 HOUR: ABNORMAL

## 2025-06-26 ENCOUNTER — TELEPHONE (OUTPATIENT)
Dept: FAMILY MEDICINE CLINIC | Age: 45
End: 2025-06-26

## 2025-06-26 DIAGNOSIS — E66.813 CLASS 3 SEVERE OBESITY DUE TO EXCESS CALORIES WITH SERIOUS COMORBIDITY AND BODY MASS INDEX (BMI) OF 50.0 TO 59.9 IN ADULT (HCC): Primary | ICD-10-CM

## 2025-06-26 DIAGNOSIS — R73.01 IFG (IMPAIRED FASTING GLUCOSE): ICD-10-CM

## 2025-06-26 DIAGNOSIS — E78.2 MIXED HYPERLIPIDEMIA: ICD-10-CM

## 2025-06-26 NOTE — TELEPHONE ENCOUNTER
Pt states that he feels dizzy/nauseous every time he eats, is concerned of GTT results and would like to be prescribed saxenda, please advise

## 2025-07-08 ENCOUNTER — OFFICE VISIT (OUTPATIENT)
Dept: FAMILY MEDICINE CLINIC | Age: 45
End: 2025-07-08
Payer: COMMERCIAL

## 2025-07-08 VITALS
DIASTOLIC BLOOD PRESSURE: 80 MMHG | BODY MASS INDEX: 56.99 KG/M2 | WEIGHT: 315 LBS | HEART RATE: 88 BPM | SYSTOLIC BLOOD PRESSURE: 174 MMHG | OXYGEN SATURATION: 96 %

## 2025-07-08 DIAGNOSIS — I10 ESSENTIAL HYPERTENSION: Primary | ICD-10-CM

## 2025-07-08 DIAGNOSIS — E66.813 CLASS 3 SEVERE OBESITY DUE TO EXCESS CALORIES WITH SERIOUS COMORBIDITY AND BODY MASS INDEX (BMI) OF 50.0 TO 59.9 IN ADULT (HCC): ICD-10-CM

## 2025-07-08 PROCEDURE — 3077F SYST BP >= 140 MM HG: CPT | Performed by: NURSE PRACTITIONER

## 2025-07-08 PROCEDURE — G8427 DOCREV CUR MEDS BY ELIG CLIN: HCPCS | Performed by: NURSE PRACTITIONER

## 2025-07-08 PROCEDURE — G8417 CALC BMI ABV UP PARAM F/U: HCPCS | Performed by: NURSE PRACTITIONER

## 2025-07-08 PROCEDURE — 99213 OFFICE O/P EST LOW 20 MIN: CPT | Performed by: NURSE PRACTITIONER

## 2025-07-08 PROCEDURE — 3079F DIAST BP 80-89 MM HG: CPT | Performed by: NURSE PRACTITIONER

## 2025-07-08 PROCEDURE — 4004F PT TOBACCO SCREEN RCVD TLK: CPT | Performed by: NURSE PRACTITIONER

## 2025-07-08 RX ORDER — AMLODIPINE BESYLATE 10 MG/1
10 TABLET ORAL DAILY
Qty: 30 TABLET | Refills: 5 | Status: SHIPPED | OUTPATIENT
Start: 2025-07-08

## 2025-07-08 RX ORDER — CARVEDILOL 6.25 MG/1
6.25 TABLET ORAL 2 TIMES DAILY
Qty: 60 TABLET | Refills: 5 | Status: SHIPPED | OUTPATIENT
Start: 2025-07-08

## 2025-07-08 NOTE — PROGRESS NOTES
[place] [id number] [facility] [address]  [place] [address]  Dept: [phone]  Dept Fax: [phone]    Date of Service:  7/8/2025    Owen Interiano is a 45 y.o. male who presents today for his medical conditions/complaints as noted below.      Chief Complaint   Patient presents with    Hypertension     F/u. Reports has been checking bp at home and have been within normal ranges      DIAGNOSIS / PLAN:     Assessment & Plan  1. Hypertension: Chronic.  - Blood pressure remains elevated at 174/80 despite current medications.  - Pulse is 88.  - Increase amlodipine to 10 mg daily.  - Double carvedilol to 6.25 mg twice daily.  - Continue monitoring blood pressure at home.  - Maintain a low-salt diet.  - Ensure adequate hydration.    2. Prediabetes.  - Reports experiencing blurred vision after eating, which may be related to elevated blood sugar levels.  - Previous glucose test indicated prediabetes.  - Prescribed Saxenda and will start the medication this week.  - Monitor symptoms and report any adverse effects.    Follow-up  - Check blood pressure periodically at home.       Essential hypertension  -     amLODIPine (NORVASC) 10 MG tablet; Take 1 tablet by mouth daily, Disp-30 tablet, R-5Normal  -     carvedilol (COREG) 6.25 MG tablet; Take 1 tablet by mouth 2 times daily, Disp-60 tablet, R-5Normal  Class 3 severe obesity due to excess calories with serious comorbidity and body mass index (BMI) of 50.0 to 59.9 in adult (HCC)     Discussed the purpose, benefits, and potential side effects of the prescribed medications in detail. Addressed all of the patient's questions, and the patient expressed clear understanding. Reviewed health maintenance recommendations and advised the patient to continue their current medications, diet, and exercise regimen. The patient agreed with the proposed treatment plan. Follow-up scheduled as directed.    Return in about 4 weeks (around

## 2025-07-23 ENCOUNTER — TELEPHONE (OUTPATIENT)
Dept: FAMILY MEDICINE CLINIC | Age: 45
End: 2025-07-23

## 2025-07-23 DIAGNOSIS — I10 ESSENTIAL HYPERTENSION: ICD-10-CM

## 2025-07-23 RX ORDER — CARVEDILOL 12.5 MG/1
12.5 TABLET ORAL 2 TIMES DAILY
Qty: 60 TABLET | Refills: 0
Start: 2025-07-23

## 2025-07-23 RX ORDER — AMLODIPINE BESYLATE 5 MG/1
5 TABLET ORAL DAILY
Qty: 30 TABLET | Refills: 0
Start: 2025-07-23

## 2025-07-23 NOTE — TELEPHONE ENCOUNTER
Pt was recently at [facility] ER for edema to lower extremities and HTN. Pt was advised to f/u with pcp since labs and imagaing were unremarkable. Pt is scheduled for 7/28/25 but is c/o of having open tears in legs from swelling pt inquiring on what to do to help with the HECTOR? Currently only diuretic taking is Losartan/HCTZ

## 2025-07-23 NOTE — TELEPHONE ENCOUNTER
Please notify patient swelling to the lower extremities is likely due to the increase of amlodipine to 10 mg.  Recommend decreasing amlodipine back down to 5 mg and increase the carvedilol to 12.5 mg twice daily.  Increase fluids, decrease salt, and elevate legs while seated. Keep scheduled follow-up appointment on 7/28/2025.

## 2025-07-28 ENCOUNTER — OFFICE VISIT (OUTPATIENT)
Dept: FAMILY MEDICINE CLINIC | Age: 45
End: 2025-07-28
Payer: COMMERCIAL

## 2025-07-28 VITALS
DIASTOLIC BLOOD PRESSURE: 80 MMHG | HEART RATE: 84 BPM | OXYGEN SATURATION: 95 % | WEIGHT: 315 LBS | SYSTOLIC BLOOD PRESSURE: 134 MMHG | BODY MASS INDEX: 57.09 KG/M2

## 2025-07-28 DIAGNOSIS — E87.6 HYPOKALEMIA: ICD-10-CM

## 2025-07-28 DIAGNOSIS — R39.198 DIFFICULTY URINATING: ICD-10-CM

## 2025-07-28 DIAGNOSIS — R60.0 PERIPHERAL EDEMA: ICD-10-CM

## 2025-07-28 DIAGNOSIS — S80.811A ABRASION OF RIGHT LOWER EXTREMITY, INITIAL ENCOUNTER: ICD-10-CM

## 2025-07-28 DIAGNOSIS — R35.0 FREQUENT URINATION: ICD-10-CM

## 2025-07-28 DIAGNOSIS — I10 ESSENTIAL HYPERTENSION: Primary | ICD-10-CM

## 2025-07-28 LAB
APPEARANCE FLUID: CLEAR
BILIRUBIN, POC: NORMAL
BLOOD URINE, POC: NORMAL
CLARITY, POC: CLEAR
COLOR, POC: YELLOW
DIAGNOSTIC PSA: 0.55 NG/ML (ref 0–4)
GLUCOSE URINE, POC: NORMAL MG/DL
KETONES, POC: NORMAL MG/DL
LEUKOCYTE EST, POC: NORMAL
NITRITE, POC: NORMAL
PH, POC: 7
POTASSIUM SERPL-SCNC: 3.9 MMOL/L (ref 3.6–5)
PROTEIN, POC: NORMAL MG/DL
SPECIFIC GRAVITY, POC: 1.02
UROBILINOGEN, POC: 0.2 MG/DL

## 2025-07-28 PROCEDURE — G8427 DOCREV CUR MEDS BY ELIG CLIN: HCPCS | Performed by: NURSE PRACTITIONER

## 2025-07-28 PROCEDURE — G8417 CALC BMI ABV UP PARAM F/U: HCPCS | Performed by: NURSE PRACTITIONER

## 2025-07-28 PROCEDURE — 99214 OFFICE O/P EST MOD 30 MIN: CPT | Performed by: NURSE PRACTITIONER

## 2025-07-28 PROCEDURE — 4004F PT TOBACCO SCREEN RCVD TLK: CPT | Performed by: NURSE PRACTITIONER

## 2025-07-28 PROCEDURE — 3079F DIAST BP 80-89 MM HG: CPT | Performed by: NURSE PRACTITIONER

## 2025-07-28 PROCEDURE — 3075F SYST BP GE 130 - 139MM HG: CPT | Performed by: NURSE PRACTITIONER

## 2025-07-28 PROCEDURE — 81002 URINALYSIS NONAUTO W/O SCOPE: CPT | Performed by: NURSE PRACTITIONER

## 2025-07-28 NOTE — PROGRESS NOTES
[place] [address] [facility], [address]  [place] [address]  Dept: [phone]  Dept Fax: [phone]    Date of Service:  7/28/2025    Owen Interiano is a 45 y.o. male who presents today for his medical conditions/complaints as noted below.      Chief Complaint   Patient presents with    Follow-up     [facility] ED f/u, edema and HTN, lega do well when elevated but swell when he has socks on and is moving, swelled up and broke skin open      DIAGNOSIS / PLAN:     Assessment & Plan  1. Leg swelling: Chronic. Potassium 3.3. Cardiac enzymes normal. No evidence of cardiac issue. Hip discomfort unrelated to compression stockings.  - Continue using compression stockings during the day and remove at night.  - Conduct urine test to rule out infection or hematuria.  - Recheck potassium levels; supplement if low.    2. Frequent urination: No burning with urination.  - Conduct urine test to rule out infection.  - Conduct PSA test to rule out prostate-related problems.  - Ensure adequate hydration, especially during physical activity.  - Referral to urology for further evaluation; previously under care of Dr. Morales.    3. Medication management: Current medications include carvedilol 12.5 mg twice daily, amlodipine 5 mg once daily, and hydrochlorothiazide/losartan once daily.  - Ensure correct medication intake as prescribed.  - Send prescription for carvedilol 12.5 mg once compliance with increased dose is confirmed.       Essential hypertension  Assessment & Plan:   Chronic, at goal (stable), continue current treatment plan  Difficulty urinating  -     External Referral To Urology  Frequent urination  -     POCT Urinalysis no Micro  -     PSA Screening; Future  -     External Referral To Urology  Hypokalemia  -     Potassium; Future  Peripheral edema  Assessment & Plan:  - Continue using compression stockings during the day and remove at night.  Abrasion of right lower extremity, initial

## 2025-08-05 ENCOUNTER — OFFICE VISIT (OUTPATIENT)
Dept: FAMILY MEDICINE CLINIC | Age: 45
End: 2025-08-05
Payer: COMMERCIAL

## 2025-08-05 VITALS
OXYGEN SATURATION: 95 % | DIASTOLIC BLOOD PRESSURE: 82 MMHG | WEIGHT: 315 LBS | BODY MASS INDEX: 56.97 KG/M2 | HEART RATE: 82 BPM | SYSTOLIC BLOOD PRESSURE: 140 MMHG

## 2025-08-05 DIAGNOSIS — I10 ESSENTIAL HYPERTENSION: Primary | ICD-10-CM

## 2025-08-05 DIAGNOSIS — R60.0 PERIPHERAL EDEMA: ICD-10-CM

## 2025-08-05 PROCEDURE — 3077F SYST BP >= 140 MM HG: CPT | Performed by: NURSE PRACTITIONER

## 2025-08-05 PROCEDURE — G8427 DOCREV CUR MEDS BY ELIG CLIN: HCPCS | Performed by: NURSE PRACTITIONER

## 2025-08-05 PROCEDURE — 4004F PT TOBACCO SCREEN RCVD TLK: CPT | Performed by: NURSE PRACTITIONER

## 2025-08-05 PROCEDURE — 3079F DIAST BP 80-89 MM HG: CPT | Performed by: NURSE PRACTITIONER

## 2025-08-05 PROCEDURE — 99214 OFFICE O/P EST MOD 30 MIN: CPT | Performed by: NURSE PRACTITIONER

## 2025-08-05 PROCEDURE — G8417 CALC BMI ABV UP PARAM F/U: HCPCS | Performed by: NURSE PRACTITIONER

## 2025-08-05 RX ORDER — FUROSEMIDE 20 MG/1
20 TABLET ORAL DAILY PRN
Qty: 10 TABLET | Refills: 0 | Status: SHIPPED | OUTPATIENT
Start: 2025-08-05

## 2025-08-05 RX ORDER — CARVEDILOL 25 MG/1
25 TABLET ORAL 2 TIMES DAILY
Qty: 60 TABLET | Refills: 5 | Status: SHIPPED | OUTPATIENT
Start: 2025-08-05